# Patient Record
Sex: MALE | Race: ASIAN | NOT HISPANIC OR LATINO | URBAN - METROPOLITAN AREA
[De-identification: names, ages, dates, MRNs, and addresses within clinical notes are randomized per-mention and may not be internally consistent; named-entity substitution may affect disease eponyms.]

---

## 2020-01-01 ENCOUNTER — INPATIENT (INPATIENT)
Facility: HOSPITAL | Age: 65
LOS: 9 days | DRG: 28 | End: 2020-10-24
Attending: INTERNAL MEDICINE | Admitting: TRANSPLANT SURGERY
Payer: MEDICARE

## 2020-01-01 VITALS
OXYGEN SATURATION: 88 % | HEART RATE: 98 BPM | TEMPERATURE: 98 F | RESPIRATION RATE: 16 BRPM | SYSTOLIC BLOOD PRESSURE: 153 MMHG | DIASTOLIC BLOOD PRESSURE: 80 MMHG

## 2020-01-01 VITALS
HEART RATE: 85 BPM | RESPIRATION RATE: 16 BRPM | DIASTOLIC BLOOD PRESSURE: 69 MMHG | SYSTOLIC BLOOD PRESSURE: 116 MMHG | OXYGEN SATURATION: 100 %

## 2020-01-01 DIAGNOSIS — Z71.89 OTHER SPECIFIED COUNSELING: ICD-10-CM

## 2020-01-01 DIAGNOSIS — Z51.5 ENCOUNTER FOR PALLIATIVE CARE: ICD-10-CM

## 2020-01-01 DIAGNOSIS — R53.2 FUNCTIONAL QUADRIPLEGIA: ICD-10-CM

## 2020-01-01 DIAGNOSIS — G95.20 UNSPECIFIED CORD COMPRESSION: ICD-10-CM
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
## 2020-01-01 DIAGNOSIS — J96.90 RESPIRATORY FAILURE, UNSPECIFIED, UNSPECIFIED WHETHER WITH HYPOXIA OR HYPERCAPNIA: ICD-10-CM

## 2020-01-01 DIAGNOSIS — E87.0 HYPEROSMOLALITY AND HYPERNATREMIA: ICD-10-CM

## 2020-01-01 DIAGNOSIS — J18.9 PNEUMONIA, UNSPECIFIED ORGANISM: ICD-10-CM

## 2020-01-01 DIAGNOSIS — R00.1 BRADYCARDIA, UNSPECIFIED: ICD-10-CM

## 2020-01-01 LAB
-  AMIKACIN: SIGNIFICANT CHANGE UP
-  AMIKACIN: SIGNIFICANT CHANGE UP
-  AMOXICILLIN/CLAVULANIC ACID: SIGNIFICANT CHANGE UP
-  AMOXICILLIN/CLAVULANIC ACID: SIGNIFICANT CHANGE UP
-  AMPICILLIN/SULBACTAM: SIGNIFICANT CHANGE UP
-  AMPICILLIN: SIGNIFICANT CHANGE UP
-  AMPICILLIN: SIGNIFICANT CHANGE UP
-  AZTREONAM: SIGNIFICANT CHANGE UP
-  AZTREONAM: SIGNIFICANT CHANGE UP
-  CEFAZOLIN: SIGNIFICANT CHANGE UP
-  CEFEPIME: SIGNIFICANT CHANGE UP
-  CEFEPIME: SIGNIFICANT CHANGE UP
-  CEFOXITIN: SIGNIFICANT CHANGE UP
-  CEFOXITIN: SIGNIFICANT CHANGE UP
-  CEFTRIAXONE: SIGNIFICANT CHANGE UP
-  CEFTRIAXONE: SIGNIFICANT CHANGE UP
-  CIPROFLOXACIN: SIGNIFICANT CHANGE UP
-  CIPROFLOXACIN: SIGNIFICANT CHANGE UP
-  CLINDAMYCIN: SIGNIFICANT CHANGE UP
-  ERTAPENEM: SIGNIFICANT CHANGE UP
-  ERTAPENEM: SIGNIFICANT CHANGE UP
-  ERYTHROMYCIN: SIGNIFICANT CHANGE UP
-  GENTAMICIN: SIGNIFICANT CHANGE UP
-  IMIPENEM: SIGNIFICANT CHANGE UP
-  LEVOFLOXACIN: SIGNIFICANT CHANGE UP
-  LEVOFLOXACIN: SIGNIFICANT CHANGE UP
-  MEROPENEM: SIGNIFICANT CHANGE UP
-  MEROPENEM: SIGNIFICANT CHANGE UP
-  OXACILLIN: SIGNIFICANT CHANGE UP
-  PENICILLIN: SIGNIFICANT CHANGE UP
-  PIPERACILLIN/TAZOBACTAM: SIGNIFICANT CHANGE UP
-  PIPERACILLIN/TAZOBACTAM: SIGNIFICANT CHANGE UP
-  RIFAMPIN: SIGNIFICANT CHANGE UP
-  TETRACYCLINE: SIGNIFICANT CHANGE UP
-  TOBRAMYCIN: SIGNIFICANT CHANGE UP
-  TOBRAMYCIN: SIGNIFICANT CHANGE UP
-  TRIMETHOPRIM/SULFAMETHOXAZOLE: SIGNIFICANT CHANGE UP
-  VANCOMYCIN: SIGNIFICANT CHANGE UP
A1C WITH ESTIMATED AVERAGE GLUCOSE RESULT: 6.3 % — HIGH (ref 4–5.6)
ALBUMIN SERPL ELPH-MCNC: 2.6 G/DL — LOW (ref 3.3–5)
ALBUMIN SERPL ELPH-MCNC: 4.3 G/DL — SIGNIFICANT CHANGE UP (ref 3.3–5)
ALP SERPL-CCNC: 56 U/L — SIGNIFICANT CHANGE UP (ref 40–120)
ALP SERPL-CCNC: 68 U/L — SIGNIFICANT CHANGE UP (ref 40–120)
ALT FLD-CCNC: 20 U/L — SIGNIFICANT CHANGE UP (ref 10–45)
ALT FLD-CCNC: 58 U/L — HIGH (ref 10–45)
AMPHET UR-MCNC: NEGATIVE — SIGNIFICANT CHANGE UP
ANION GAP SERPL CALC-SCNC: 11 MMOL/L — SIGNIFICANT CHANGE UP (ref 5–17)
ANION GAP SERPL CALC-SCNC: 12 MMOL/L — SIGNIFICANT CHANGE UP (ref 5–17)
ANION GAP SERPL CALC-SCNC: 13 MMOL/L — SIGNIFICANT CHANGE UP (ref 5–17)
ANION GAP SERPL CALC-SCNC: 15 MMOL/L — SIGNIFICANT CHANGE UP (ref 5–17)
ANION GAP SERPL CALC-SCNC: 15 MMOL/L — SIGNIFICANT CHANGE UP (ref 5–17)
ANION GAP SERPL CALC-SCNC: 16 MMOL/L — SIGNIFICANT CHANGE UP (ref 5–17)
ANION GAP SERPL CALC-SCNC: 21 MMOL/L — HIGH (ref 5–17)
ANION GAP SERPL CALC-SCNC: 24 MMOL/L — HIGH (ref 5–17)
ANION GAP SERPL CALC-SCNC: 8 MMOL/L — SIGNIFICANT CHANGE UP (ref 5–17)
ANION GAP SERPL CALC-SCNC: 9 MMOL/L — SIGNIFICANT CHANGE UP (ref 5–17)
ANION GAP SERPL CALC-SCNC: 9 MMOL/L — SIGNIFICANT CHANGE UP (ref 5–17)
APPEARANCE UR: CLEAR — SIGNIFICANT CHANGE UP
APTT BLD: 24.5 SEC — LOW (ref 27.5–35.5)
AST SERPL-CCNC: 25 U/L — SIGNIFICANT CHANGE UP (ref 10–40)
AST SERPL-CCNC: 66 U/L — HIGH (ref 10–40)
B-OH-BUTYR SERPL-SCNC: 1.5 MMOL/L — HIGH
B-OH-BUTYR SERPL-SCNC: 1.9 MMOL/L — HIGH
B-OH-BUTYR SERPL-SCNC: 3.1 MMOL/L — HIGH
B-OH-BUTYR SERPL-SCNC: 3.5 MMOL/L — HIGH
BACTERIA # UR AUTO: NEGATIVE — SIGNIFICANT CHANGE UP
BARBITURATES UR SCN-MCNC: NEGATIVE — SIGNIFICANT CHANGE UP
BASOPHILS # BLD AUTO: 0 K/UL — SIGNIFICANT CHANGE UP (ref 0–0.2)
BASOPHILS NFR BLD AUTO: 0 % — SIGNIFICANT CHANGE UP (ref 0–2)
BENZODIAZ UR-MCNC: NEGATIVE — SIGNIFICANT CHANGE UP
BILIRUB SERPL-MCNC: 0.4 MG/DL — SIGNIFICANT CHANGE UP (ref 0.2–1.2)
BILIRUB SERPL-MCNC: 0.5 MG/DL — SIGNIFICANT CHANGE UP (ref 0.2–1.2)
BILIRUB UR-MCNC: NEGATIVE — SIGNIFICANT CHANGE UP
BLD GP AB SCN SERPL QL: NEGATIVE — SIGNIFICANT CHANGE UP
BLD GP AB SCN SERPL QL: NEGATIVE — SIGNIFICANT CHANGE UP
BUN SERPL-MCNC: 10 MG/DL — SIGNIFICANT CHANGE UP (ref 7–23)
BUN SERPL-MCNC: 11 MG/DL — SIGNIFICANT CHANGE UP (ref 7–23)
BUN SERPL-MCNC: 13 MG/DL — SIGNIFICANT CHANGE UP (ref 7–23)
BUN SERPL-MCNC: 15 MG/DL — SIGNIFICANT CHANGE UP (ref 7–23)
BUN SERPL-MCNC: 17 MG/DL — SIGNIFICANT CHANGE UP (ref 7–23)
BUN SERPL-MCNC: 27 MG/DL — HIGH (ref 7–23)
BUN SERPL-MCNC: 28 MG/DL — HIGH (ref 7–23)
BUN SERPL-MCNC: 29 MG/DL — HIGH (ref 7–23)
BUN SERPL-MCNC: 29 MG/DL — HIGH (ref 7–23)
BUN SERPL-MCNC: 30 MG/DL — HIGH (ref 7–23)
BUN SERPL-MCNC: 30 MG/DL — HIGH (ref 7–23)
BUN SERPL-MCNC: 9 MG/DL — SIGNIFICANT CHANGE UP (ref 7–23)
CALCIUM SERPL-MCNC: 7.4 MG/DL — LOW (ref 8.4–10.5)
CALCIUM SERPL-MCNC: 7.5 MG/DL — LOW (ref 8.4–10.5)
CALCIUM SERPL-MCNC: 8 MG/DL — LOW (ref 8.4–10.5)
CALCIUM SERPL-MCNC: 8.3 MG/DL — LOW (ref 8.4–10.5)
CALCIUM SERPL-MCNC: 8.4 MG/DL — SIGNIFICANT CHANGE UP (ref 8.4–10.5)
CALCIUM SERPL-MCNC: 8.4 MG/DL — SIGNIFICANT CHANGE UP (ref 8.4–10.5)
CALCIUM SERPL-MCNC: 8.5 MG/DL — SIGNIFICANT CHANGE UP (ref 8.4–10.5)
CALCIUM SERPL-MCNC: 8.7 MG/DL — SIGNIFICANT CHANGE UP (ref 8.4–10.5)
CALCIUM SERPL-MCNC: 8.9 MG/DL — SIGNIFICANT CHANGE UP (ref 8.4–10.5)
CALCIUM SERPL-MCNC: 9.3 MG/DL — SIGNIFICANT CHANGE UP (ref 8.4–10.5)
CALCIUM SERPL-MCNC: 9.4 MG/DL — SIGNIFICANT CHANGE UP (ref 8.4–10.5)
CHLORIDE SERPL-SCNC: 101 MMOL/L — SIGNIFICANT CHANGE UP (ref 96–108)
CHLORIDE SERPL-SCNC: 103 MMOL/L — SIGNIFICANT CHANGE UP (ref 96–108)
CHLORIDE SERPL-SCNC: 103 MMOL/L — SIGNIFICANT CHANGE UP (ref 96–108)
CHLORIDE SERPL-SCNC: 104 MMOL/L — SIGNIFICANT CHANGE UP (ref 96–108)
CHLORIDE SERPL-SCNC: 104 MMOL/L — SIGNIFICANT CHANGE UP (ref 96–108)
CHLORIDE SERPL-SCNC: 105 MMOL/L — SIGNIFICANT CHANGE UP (ref 96–108)
CHLORIDE SERPL-SCNC: 105 MMOL/L — SIGNIFICANT CHANGE UP (ref 96–108)
CHLORIDE SERPL-SCNC: 106 MMOL/L — SIGNIFICANT CHANGE UP (ref 96–108)
CHLORIDE SERPL-SCNC: 107 MMOL/L — SIGNIFICANT CHANGE UP (ref 96–108)
CHLORIDE SERPL-SCNC: 107 MMOL/L — SIGNIFICANT CHANGE UP (ref 96–108)
CHLORIDE SERPL-SCNC: 109 MMOL/L — HIGH (ref 96–108)
CHLORIDE SERPL-SCNC: 111 MMOL/L — HIGH (ref 96–108)
CHLORIDE SERPL-SCNC: 113 MMOL/L — HIGH (ref 96–108)
CHLORIDE SERPL-SCNC: 114 MMOL/L — HIGH (ref 96–108)
CHLORIDE SERPL-SCNC: 116 MMOL/L — HIGH (ref 96–108)
CHLORIDE UR-SCNC: 183 MMOL/L — SIGNIFICANT CHANGE UP
CO2 SERPL-SCNC: 12 MMOL/L — LOW (ref 22–31)
CO2 SERPL-SCNC: 12 MMOL/L — LOW (ref 22–31)
CO2 SERPL-SCNC: 14 MMOL/L — LOW (ref 22–31)
CO2 SERPL-SCNC: 15 MMOL/L — LOW (ref 22–31)
CO2 SERPL-SCNC: 16 MMOL/L — LOW (ref 22–31)
CO2 SERPL-SCNC: 17 MMOL/L — LOW (ref 22–31)
CO2 SERPL-SCNC: 17 MMOL/L — LOW (ref 22–31)
CO2 SERPL-SCNC: 18 MMOL/L — LOW (ref 22–31)
CO2 SERPL-SCNC: 19 MMOL/L — LOW (ref 22–31)
CO2 SERPL-SCNC: 24 MMOL/L — SIGNIFICANT CHANGE UP (ref 22–31)
CO2 SERPL-SCNC: 26 MMOL/L — SIGNIFICANT CHANGE UP (ref 22–31)
CO2 SERPL-SCNC: 27 MMOL/L — SIGNIFICANT CHANGE UP (ref 22–31)
CO2 SERPL-SCNC: 28 MMOL/L — SIGNIFICANT CHANGE UP (ref 22–31)
COCAINE METAB.OTHER UR-MCNC: NEGATIVE — SIGNIFICANT CHANGE UP
COLOR SPEC: SIGNIFICANT CHANGE UP
CREAT ?TM UR-MCNC: 59 MG/DL — SIGNIFICANT CHANGE UP
CREAT SERPL-MCNC: 0.64 MG/DL — SIGNIFICANT CHANGE UP (ref 0.5–1.3)
CREAT SERPL-MCNC: 0.66 MG/DL — SIGNIFICANT CHANGE UP (ref 0.5–1.3)
CREAT SERPL-MCNC: 0.66 MG/DL — SIGNIFICANT CHANGE UP (ref 0.5–1.3)
CREAT SERPL-MCNC: 0.67 MG/DL — SIGNIFICANT CHANGE UP (ref 0.5–1.3)
CREAT SERPL-MCNC: 0.68 MG/DL — SIGNIFICANT CHANGE UP (ref 0.5–1.3)
CREAT SERPL-MCNC: 0.7 MG/DL — SIGNIFICANT CHANGE UP (ref 0.5–1.3)
CREAT SERPL-MCNC: 0.73 MG/DL — SIGNIFICANT CHANGE UP (ref 0.5–1.3)
CREAT SERPL-MCNC: 0.73 MG/DL — SIGNIFICANT CHANGE UP (ref 0.5–1.3)
CREAT SERPL-MCNC: 0.76 MG/DL — SIGNIFICANT CHANGE UP (ref 0.5–1.3)
CREAT SERPL-MCNC: 0.78 MG/DL — SIGNIFICANT CHANGE UP (ref 0.5–1.3)
CREAT SERPL-MCNC: 0.81 MG/DL — SIGNIFICANT CHANGE UP (ref 0.5–1.3)
CREAT SERPL-MCNC: 0.82 MG/DL — SIGNIFICANT CHANGE UP (ref 0.5–1.3)
CREAT SERPL-MCNC: 0.83 MG/DL — SIGNIFICANT CHANGE UP (ref 0.5–1.3)
CREAT SERPL-MCNC: 0.85 MG/DL — SIGNIFICANT CHANGE UP (ref 0.5–1.3)
CREAT SERPL-MCNC: 1.07 MG/DL — SIGNIFICANT CHANGE UP (ref 0.5–1.3)
CULTURE RESULTS: SIGNIFICANT CHANGE UP
DIFF PNL FLD: ABNORMAL
EOSINOPHIL # BLD AUTO: 0.13 K/UL — SIGNIFICANT CHANGE UP (ref 0–0.5)
EOSINOPHIL NFR BLD AUTO: 1.8 % — SIGNIFICANT CHANGE UP (ref 0–6)
EPI CELLS # UR: 0 /HPF — SIGNIFICANT CHANGE UP
ESTIMATED AVERAGE GLUCOSE: 134 MG/DL — HIGH (ref 68–114)
ETHANOL SERPL-MCNC: SIGNIFICANT CHANGE UP MG/DL (ref 0–10)
GAS PNL BLDA: SIGNIFICANT CHANGE UP
GAS PNL BLDV: SIGNIFICANT CHANGE UP
GLUCOSE BLDC GLUCOMTR-MCNC: 104 MG/DL — HIGH (ref 70–99)
GLUCOSE BLDC GLUCOMTR-MCNC: 107 MG/DL — HIGH (ref 70–99)
GLUCOSE BLDC GLUCOMTR-MCNC: 111 MG/DL — HIGH (ref 70–99)
GLUCOSE BLDC GLUCOMTR-MCNC: 121 MG/DL — HIGH (ref 70–99)
GLUCOSE BLDC GLUCOMTR-MCNC: 122 MG/DL — HIGH (ref 70–99)
GLUCOSE BLDC GLUCOMTR-MCNC: 128 MG/DL — HIGH (ref 70–99)
GLUCOSE BLDC GLUCOMTR-MCNC: 134 MG/DL — HIGH (ref 70–99)
GLUCOSE BLDC GLUCOMTR-MCNC: 134 MG/DL — HIGH (ref 70–99)
GLUCOSE BLDC GLUCOMTR-MCNC: 136 MG/DL — HIGH (ref 70–99)
GLUCOSE BLDC GLUCOMTR-MCNC: 137 MG/DL — HIGH (ref 70–99)
GLUCOSE BLDC GLUCOMTR-MCNC: 138 MG/DL — HIGH (ref 70–99)
GLUCOSE BLDC GLUCOMTR-MCNC: 142 MG/DL — HIGH (ref 70–99)
GLUCOSE BLDC GLUCOMTR-MCNC: 144 MG/DL — HIGH (ref 70–99)
GLUCOSE BLDC GLUCOMTR-MCNC: 149 MG/DL — HIGH (ref 70–99)
GLUCOSE BLDC GLUCOMTR-MCNC: 156 MG/DL — HIGH (ref 70–99)
GLUCOSE BLDC GLUCOMTR-MCNC: 157 MG/DL — HIGH (ref 70–99)
GLUCOSE BLDC GLUCOMTR-MCNC: 161 MG/DL — HIGH (ref 70–99)
GLUCOSE BLDC GLUCOMTR-MCNC: 162 MG/DL — HIGH (ref 70–99)
GLUCOSE BLDC GLUCOMTR-MCNC: 169 MG/DL — HIGH (ref 70–99)
GLUCOSE BLDC GLUCOMTR-MCNC: 169 MG/DL — HIGH (ref 70–99)
GLUCOSE BLDC GLUCOMTR-MCNC: 171 MG/DL — HIGH (ref 70–99)
GLUCOSE BLDC GLUCOMTR-MCNC: 171 MG/DL — HIGH (ref 70–99)
GLUCOSE BLDC GLUCOMTR-MCNC: 173 MG/DL — HIGH (ref 70–99)
GLUCOSE BLDC GLUCOMTR-MCNC: 186 MG/DL — HIGH (ref 70–99)
GLUCOSE BLDC GLUCOMTR-MCNC: 194 MG/DL — HIGH (ref 70–99)
GLUCOSE BLDC GLUCOMTR-MCNC: 197 MG/DL — HIGH (ref 70–99)
GLUCOSE BLDC GLUCOMTR-MCNC: 199 MG/DL — HIGH (ref 70–99)
GLUCOSE BLDC GLUCOMTR-MCNC: 203 MG/DL — HIGH (ref 70–99)
GLUCOSE BLDC GLUCOMTR-MCNC: 209 MG/DL — HIGH (ref 70–99)
GLUCOSE BLDC GLUCOMTR-MCNC: 213 MG/DL — HIGH (ref 70–99)
GLUCOSE BLDC GLUCOMTR-MCNC: 214 MG/DL — HIGH (ref 70–99)
GLUCOSE BLDC GLUCOMTR-MCNC: 214 MG/DL — HIGH (ref 70–99)
GLUCOSE BLDC GLUCOMTR-MCNC: 222 MG/DL — HIGH (ref 70–99)
GLUCOSE BLDC GLUCOMTR-MCNC: 226 MG/DL — HIGH (ref 70–99)
GLUCOSE BLDC GLUCOMTR-MCNC: 232 MG/DL — HIGH (ref 70–99)
GLUCOSE BLDC GLUCOMTR-MCNC: 233 MG/DL — HIGH (ref 70–99)
GLUCOSE BLDC GLUCOMTR-MCNC: 235 MG/DL — HIGH (ref 70–99)
GLUCOSE BLDC GLUCOMTR-MCNC: 247 MG/DL — HIGH (ref 70–99)
GLUCOSE BLDC GLUCOMTR-MCNC: 250 MG/DL — HIGH (ref 70–99)
GLUCOSE BLDC GLUCOMTR-MCNC: 253 MG/DL — HIGH (ref 70–99)
GLUCOSE BLDC GLUCOMTR-MCNC: 255 MG/DL — HIGH (ref 70–99)
GLUCOSE BLDC GLUCOMTR-MCNC: 264 MG/DL — HIGH (ref 70–99)
GLUCOSE BLDC GLUCOMTR-MCNC: 277 MG/DL — HIGH (ref 70–99)
GLUCOSE BLDC GLUCOMTR-MCNC: 53 MG/DL — LOW (ref 70–99)
GLUCOSE BLDC GLUCOMTR-MCNC: 62 MG/DL — LOW (ref 70–99)
GLUCOSE BLDC GLUCOMTR-MCNC: 73 MG/DL — SIGNIFICANT CHANGE UP (ref 70–99)
GLUCOSE BLDC GLUCOMTR-MCNC: 96 MG/DL — SIGNIFICANT CHANGE UP (ref 70–99)
GLUCOSE BLDC GLUCOMTR-MCNC: 99 MG/DL — SIGNIFICANT CHANGE UP (ref 70–99)
GLUCOSE SERPL-MCNC: 122 MG/DL — HIGH (ref 70–99)
GLUCOSE SERPL-MCNC: 127 MG/DL — HIGH (ref 70–99)
GLUCOSE SERPL-MCNC: 134 MG/DL — HIGH (ref 70–99)
GLUCOSE SERPL-MCNC: 149 MG/DL — HIGH (ref 70–99)
GLUCOSE SERPL-MCNC: 160 MG/DL — HIGH (ref 70–99)
GLUCOSE SERPL-MCNC: 171 MG/DL — HIGH (ref 70–99)
GLUCOSE SERPL-MCNC: 185 MG/DL — HIGH (ref 70–99)
GLUCOSE SERPL-MCNC: 203 MG/DL — HIGH (ref 70–99)
GLUCOSE SERPL-MCNC: 223 MG/DL — HIGH (ref 70–99)
GLUCOSE SERPL-MCNC: 225 MG/DL — HIGH (ref 70–99)
GLUCOSE SERPL-MCNC: 242 MG/DL — HIGH (ref 70–99)
GLUCOSE SERPL-MCNC: 243 MG/DL — HIGH (ref 70–99)
GLUCOSE SERPL-MCNC: 252 MG/DL — HIGH (ref 70–99)
GLUCOSE SERPL-MCNC: 261 MG/DL — HIGH (ref 70–99)
GLUCOSE SERPL-MCNC: 95 MG/DL — SIGNIFICANT CHANGE UP (ref 70–99)
GLUCOSE UR QL: ABNORMAL
GRAM STN FLD: SIGNIFICANT CHANGE UP
HCT VFR BLD CALC: 21.6 % — LOW (ref 39–50)
HCT VFR BLD CALC: 21.8 % — LOW (ref 39–50)
HCT VFR BLD CALC: 22.1 % — LOW (ref 39–50)
HCT VFR BLD CALC: 23.8 % — LOW (ref 39–50)
HCT VFR BLD CALC: 23.9 % — LOW (ref 39–50)
HCT VFR BLD CALC: 24.2 % — LOW (ref 39–50)
HCT VFR BLD CALC: 26.1 % — LOW (ref 39–50)
HCT VFR BLD CALC: 28.9 % — LOW (ref 39–50)
HCT VFR BLD CALC: 30.9 % — LOW (ref 39–50)
HCT VFR BLD CALC: 32.1 % — LOW (ref 39–50)
HCT VFR BLD CALC: 32.5 % — LOW (ref 39–50)
HCV AB S/CO SERPL IA: 0.08 S/CO — SIGNIFICANT CHANGE UP (ref 0–0.99)
HCV AB SERPL-IMP: SIGNIFICANT CHANGE UP
HGB BLD-MCNC: 10.2 G/DL — LOW (ref 13–17)
HGB BLD-MCNC: 10.4 G/DL — LOW (ref 13–17)
HGB BLD-MCNC: 10.4 G/DL — LOW (ref 13–17)
HGB BLD-MCNC: 7.3 G/DL — LOW (ref 13–17)
HGB BLD-MCNC: 7.4 G/DL — LOW (ref 13–17)
HGB BLD-MCNC: 7.5 G/DL — LOW (ref 13–17)
HGB BLD-MCNC: 7.8 G/DL — LOW (ref 13–17)
HGB BLD-MCNC: 8.1 G/DL — LOW (ref 13–17)
HGB BLD-MCNC: 8.1 G/DL — LOW (ref 13–17)
HGB BLD-MCNC: 8.6 G/DL — LOW (ref 13–17)
HGB BLD-MCNC: 9.5 G/DL — LOW (ref 13–17)
HYALINE CASTS # UR AUTO: 0 /LPF — SIGNIFICANT CHANGE UP (ref 0–7)
INR BLD: 1.01 RATIO — SIGNIFICANT CHANGE UP (ref 0.88–1.16)
IRON SATN MFR SERPL: 15 UG/DL — LOW (ref 45–165)
IRON SATN MFR SERPL: 9 % — LOW (ref 16–55)
KETONES UR-MCNC: ABNORMAL
LEUKOCYTE ESTERASE UR-ACNC: NEGATIVE — SIGNIFICANT CHANGE UP
LIDOCAIN IGE QN: 69 U/L — HIGH (ref 7–60)
LYMPHOCYTES # BLD AUTO: 0.82 K/UL — LOW (ref 1–3.3)
LYMPHOCYTES # BLD AUTO: 11.3 % — LOW (ref 13–44)
MAGNESIUM SERPL-MCNC: 1.6 MG/DL — SIGNIFICANT CHANGE UP (ref 1.6–2.6)
MAGNESIUM SERPL-MCNC: 1.9 MG/DL — SIGNIFICANT CHANGE UP (ref 1.6–2.6)
MAGNESIUM SERPL-MCNC: 2 MG/DL — SIGNIFICANT CHANGE UP (ref 1.6–2.6)
MAGNESIUM SERPL-MCNC: 2 MG/DL — SIGNIFICANT CHANGE UP (ref 1.6–2.6)
MAGNESIUM SERPL-MCNC: 2.2 MG/DL — SIGNIFICANT CHANGE UP (ref 1.6–2.6)
MAGNESIUM SERPL-MCNC: 2.3 MG/DL — SIGNIFICANT CHANGE UP (ref 1.6–2.6)
MAGNESIUM SERPL-MCNC: 2.3 MG/DL — SIGNIFICANT CHANGE UP (ref 1.6–2.6)
MAGNESIUM SERPL-MCNC: 2.4 MG/DL — SIGNIFICANT CHANGE UP (ref 1.6–2.6)
MAGNESIUM SERPL-MCNC: 2.4 MG/DL — SIGNIFICANT CHANGE UP (ref 1.6–2.6)
MAGNESIUM SERPL-MCNC: 2.5 MG/DL — SIGNIFICANT CHANGE UP (ref 1.6–2.6)
MCHC RBC-ENTMCNC: 22.6 PG — LOW (ref 27–34)
MCHC RBC-ENTMCNC: 23 PG — LOW (ref 27–34)
MCHC RBC-ENTMCNC: 23.3 PG — LOW (ref 27–34)
MCHC RBC-ENTMCNC: 23.5 PG — LOW (ref 27–34)
MCHC RBC-ENTMCNC: 23.7 PG — LOW (ref 27–34)
MCHC RBC-ENTMCNC: 24 PG — LOW (ref 27–34)
MCHC RBC-ENTMCNC: 24.1 PG — LOW (ref 27–34)
MCHC RBC-ENTMCNC: 24.1 PG — LOW (ref 27–34)
MCHC RBC-ENTMCNC: 24.6 PG — LOW (ref 27–34)
MCHC RBC-ENTMCNC: 32 GM/DL — SIGNIFICANT CHANGE UP (ref 32–36)
MCHC RBC-ENTMCNC: 32.4 GM/DL — SIGNIFICANT CHANGE UP (ref 32–36)
MCHC RBC-ENTMCNC: 32.8 GM/DL — SIGNIFICANT CHANGE UP (ref 32–36)
MCHC RBC-ENTMCNC: 32.9 GM/DL — SIGNIFICANT CHANGE UP (ref 32–36)
MCHC RBC-ENTMCNC: 33 GM/DL — SIGNIFICANT CHANGE UP (ref 32–36)
MCHC RBC-ENTMCNC: 33 GM/DL — SIGNIFICANT CHANGE UP (ref 32–36)
MCHC RBC-ENTMCNC: 33.5 GM/DL — SIGNIFICANT CHANGE UP (ref 32–36)
MCHC RBC-ENTMCNC: 33.8 GM/DL — SIGNIFICANT CHANGE UP (ref 32–36)
MCHC RBC-ENTMCNC: 33.9 GM/DL — SIGNIFICANT CHANGE UP (ref 32–36)
MCV RBC AUTO: 67.9 FL — LOW (ref 80–100)
MCV RBC AUTO: 69.7 FL — LOW (ref 80–100)
MCV RBC AUTO: 70 FL — LOW (ref 80–100)
MCV RBC AUTO: 70.5 FL — LOW (ref 80–100)
MCV RBC AUTO: 70.6 FL — LOW (ref 80–100)
MCV RBC AUTO: 70.9 FL — LOW (ref 80–100)
MCV RBC AUTO: 71 FL — LOW (ref 80–100)
MCV RBC AUTO: 71.1 FL — LOW (ref 80–100)
MCV RBC AUTO: 71.2 FL — LOW (ref 80–100)
MCV RBC AUTO: 72.3 FL — LOW (ref 80–100)
MCV RBC AUTO: 74.8 FL — LOW (ref 80–100)
METHADONE UR-MCNC: NEGATIVE — SIGNIFICANT CHANGE UP
METHOD TYPE: SIGNIFICANT CHANGE UP
MONOCYTES # BLD AUTO: 0.38 K/UL — SIGNIFICANT CHANGE UP (ref 0–0.9)
MONOCYTES NFR BLD AUTO: 5.2 % — SIGNIFICANT CHANGE UP (ref 2–14)
NEUTROPHILS # BLD AUTO: 5.88 K/UL — SIGNIFICANT CHANGE UP (ref 1.8–7.4)
NEUTROPHILS NFR BLD AUTO: 79.1 % — HIGH (ref 43–77)
NITRITE UR-MCNC: NEGATIVE — SIGNIFICANT CHANGE UP
NRBC # BLD: 0 /100 WBCS — SIGNIFICANT CHANGE UP (ref 0–0)
NRBC # BLD: 1 /100 WBCS — HIGH (ref 0–0)
NRBC # BLD: 1 /100 WBCS — HIGH (ref 0–0)
NRBC # BLD: 2 /100 WBCS — HIGH (ref 0–0)
NRBC # BLD: 3 /100 WBCS — HIGH (ref 0–0)
OPIATES UR-MCNC: NEGATIVE — SIGNIFICANT CHANGE UP
ORGANISM # SPEC MICROSCOPIC CNT: SIGNIFICANT CHANGE UP
OSMOLALITY UR: 637 MOS/KG — SIGNIFICANT CHANGE UP (ref 300–900)
OXYCODONE UR-MCNC: NEGATIVE — SIGNIFICANT CHANGE UP
PCP SPEC-MCNC: SIGNIFICANT CHANGE UP
PCP UR-MCNC: NEGATIVE — SIGNIFICANT CHANGE UP
PH UR: 6 — SIGNIFICANT CHANGE UP (ref 5–8)
PHOSPHATE SERPL-MCNC: 1.4 MG/DL — LOW (ref 2.5–4.5)
PHOSPHATE SERPL-MCNC: 2 MG/DL — LOW (ref 2.5–4.5)
PHOSPHATE SERPL-MCNC: 2.1 MG/DL — LOW (ref 2.5–4.5)
PHOSPHATE SERPL-MCNC: 2.6 MG/DL — SIGNIFICANT CHANGE UP (ref 2.5–4.5)
PHOSPHATE SERPL-MCNC: 2.9 MG/DL — SIGNIFICANT CHANGE UP (ref 2.5–4.5)
PHOSPHATE SERPL-MCNC: 3.1 MG/DL — SIGNIFICANT CHANGE UP (ref 2.5–4.5)
PHOSPHATE SERPL-MCNC: 3.9 MG/DL — SIGNIFICANT CHANGE UP (ref 2.5–4.5)
PHOSPHATE SERPL-MCNC: 4.3 MG/DL — SIGNIFICANT CHANGE UP (ref 2.5–4.5)
PHOSPHATE SERPL-MCNC: 4.7 MG/DL — HIGH (ref 2.5–4.5)
PHOSPHATE SERPL-MCNC: 9.9 MG/DL — HIGH (ref 2.5–4.5)
PLATELET # BLD AUTO: 219 K/UL — SIGNIFICANT CHANGE UP (ref 150–400)
PLATELET # BLD AUTO: 232 K/UL — SIGNIFICANT CHANGE UP (ref 150–400)
PLATELET # BLD AUTO: 237 K/UL — SIGNIFICANT CHANGE UP (ref 150–400)
PLATELET # BLD AUTO: 252 K/UL — SIGNIFICANT CHANGE UP (ref 150–400)
PLATELET # BLD AUTO: 256 K/UL — SIGNIFICANT CHANGE UP (ref 150–400)
PLATELET # BLD AUTO: 262 K/UL — SIGNIFICANT CHANGE UP (ref 150–400)
PLATELET # BLD AUTO: 263 K/UL — SIGNIFICANT CHANGE UP (ref 150–400)
PLATELET # BLD AUTO: 271 K/UL — SIGNIFICANT CHANGE UP (ref 150–400)
PLATELET # BLD AUTO: 324 K/UL — SIGNIFICANT CHANGE UP (ref 150–400)
PLATELET # BLD AUTO: 326 K/UL — SIGNIFICANT CHANGE UP (ref 150–400)
PLATELET # BLD AUTO: 329 K/UL — SIGNIFICANT CHANGE UP (ref 150–400)
POTASSIUM SERPL-MCNC: 3.5 MMOL/L — SIGNIFICANT CHANGE UP (ref 3.5–5.3)
POTASSIUM SERPL-MCNC: 3.5 MMOL/L — SIGNIFICANT CHANGE UP (ref 3.5–5.3)
POTASSIUM SERPL-MCNC: 3.7 MMOL/L — SIGNIFICANT CHANGE UP (ref 3.5–5.3)
POTASSIUM SERPL-MCNC: 3.7 MMOL/L — SIGNIFICANT CHANGE UP (ref 3.5–5.3)
POTASSIUM SERPL-MCNC: 3.9 MMOL/L — SIGNIFICANT CHANGE UP (ref 3.5–5.3)
POTASSIUM SERPL-MCNC: 4.1 MMOL/L — SIGNIFICANT CHANGE UP (ref 3.5–5.3)
POTASSIUM SERPL-MCNC: 4.3 MMOL/L — SIGNIFICANT CHANGE UP (ref 3.5–5.3)
POTASSIUM SERPL-MCNC: 4.4 MMOL/L — SIGNIFICANT CHANGE UP (ref 3.5–5.3)
POTASSIUM SERPL-MCNC: 4.5 MMOL/L — SIGNIFICANT CHANGE UP (ref 3.5–5.3)
POTASSIUM SERPL-MCNC: 4.6 MMOL/L — SIGNIFICANT CHANGE UP (ref 3.5–5.3)
POTASSIUM SERPL-MCNC: 4.8 MMOL/L — SIGNIFICANT CHANGE UP (ref 3.5–5.3)
POTASSIUM SERPL-MCNC: 4.8 MMOL/L — SIGNIFICANT CHANGE UP (ref 3.5–5.3)
POTASSIUM SERPL-MCNC: 5.3 MMOL/L — SIGNIFICANT CHANGE UP (ref 3.5–5.3)
POTASSIUM SERPL-SCNC: 3.5 MMOL/L — SIGNIFICANT CHANGE UP (ref 3.5–5.3)
POTASSIUM SERPL-SCNC: 3.5 MMOL/L — SIGNIFICANT CHANGE UP (ref 3.5–5.3)
POTASSIUM SERPL-SCNC: 3.7 MMOL/L — SIGNIFICANT CHANGE UP (ref 3.5–5.3)
POTASSIUM SERPL-SCNC: 3.7 MMOL/L — SIGNIFICANT CHANGE UP (ref 3.5–5.3)
POTASSIUM SERPL-SCNC: 3.9 MMOL/L — SIGNIFICANT CHANGE UP (ref 3.5–5.3)
POTASSIUM SERPL-SCNC: 4.1 MMOL/L — SIGNIFICANT CHANGE UP (ref 3.5–5.3)
POTASSIUM SERPL-SCNC: 4.3 MMOL/L — SIGNIFICANT CHANGE UP (ref 3.5–5.3)
POTASSIUM SERPL-SCNC: 4.4 MMOL/L — SIGNIFICANT CHANGE UP (ref 3.5–5.3)
POTASSIUM SERPL-SCNC: 4.5 MMOL/L — SIGNIFICANT CHANGE UP (ref 3.5–5.3)
POTASSIUM SERPL-SCNC: 4.6 MMOL/L — SIGNIFICANT CHANGE UP (ref 3.5–5.3)
POTASSIUM SERPL-SCNC: 4.8 MMOL/L — SIGNIFICANT CHANGE UP (ref 3.5–5.3)
POTASSIUM SERPL-SCNC: 4.8 MMOL/L — SIGNIFICANT CHANGE UP (ref 3.5–5.3)
POTASSIUM SERPL-SCNC: 5.3 MMOL/L — SIGNIFICANT CHANGE UP (ref 3.5–5.3)
PROCALCITONIN SERPL-MCNC: 1.66 NG/ML — HIGH (ref 0.02–0.1)
PROT SERPL-MCNC: 5.6 G/DL — LOW (ref 6–8.3)
PROT SERPL-MCNC: 7.1 G/DL — SIGNIFICANT CHANGE UP (ref 6–8.3)
PROT UR-MCNC: ABNORMAL
PROTHROM AB SERPL-ACNC: 12.1 SEC — SIGNIFICANT CHANGE UP (ref 10.6–13.6)
RBC # BLD: 3.07 M/UL — LOW (ref 4.2–5.8)
RBC # BLD: 3.13 M/UL — LOW (ref 4.2–5.8)
RBC # BLD: 3.18 M/UL — LOW (ref 4.2–5.8)
RBC # BLD: 3.29 M/UL — LOW (ref 4.2–5.8)
RBC # BLD: 3.36 M/UL — LOW (ref 4.2–5.8)
RBC # BLD: 3.47 M/UL — LOW (ref 4.2–5.8)
RBC # BLD: 3.49 M/UL — LOW (ref 4.2–5.8)
RBC # BLD: 4.13 M/UL — LOW (ref 4.2–5.8)
RBC # BLD: 4.34 M/UL — SIGNIFICANT CHANGE UP (ref 4.2–5.8)
RBC # BLD: 4.53 M/UL — SIGNIFICANT CHANGE UP (ref 4.2–5.8)
RBC # BLD: 4.61 M/UL — SIGNIFICANT CHANGE UP (ref 4.2–5.8)
RBC # FLD: 14.2 % — SIGNIFICANT CHANGE UP (ref 10.3–14.5)
RBC # FLD: 14.2 % — SIGNIFICANT CHANGE UP (ref 10.3–14.5)
RBC # FLD: 14.6 % — HIGH (ref 10.3–14.5)
RBC # FLD: 14.6 % — HIGH (ref 10.3–14.5)
RBC # FLD: 14.7 % — HIGH (ref 10.3–14.5)
RBC # FLD: 14.8 % — HIGH (ref 10.3–14.5)
RBC # FLD: 16.8 % — HIGH (ref 10.3–14.5)
RBC # FLD: 16.9 % — HIGH (ref 10.3–14.5)
RBC # FLD: 16.9 % — HIGH (ref 10.3–14.5)
RBC # FLD: 17.2 % — HIGH (ref 10.3–14.5)
RBC # FLD: 19.1 % — HIGH (ref 10.3–14.5)
RBC CASTS # UR COMP ASSIST: 6 /HPF — HIGH (ref 0–4)
RH IG SCN BLD-IMP: POSITIVE — SIGNIFICANT CHANGE UP
RH IG SCN BLD-IMP: POSITIVE — SIGNIFICANT CHANGE UP
SARS-COV-2 IGG SERPL QL IA: NEGATIVE — SIGNIFICANT CHANGE UP
SARS-COV-2 IGM SERPL IA-ACNC: <0.1 INDEX — SIGNIFICANT CHANGE UP
SARS-COV-2 RNA SPEC QL NAA+PROBE: SIGNIFICANT CHANGE UP
SODIUM SERPL-SCNC: 130 MMOL/L — LOW (ref 135–145)
SODIUM SERPL-SCNC: 134 MMOL/L — LOW (ref 135–145)
SODIUM SERPL-SCNC: 136 MMOL/L — SIGNIFICANT CHANGE UP (ref 135–145)
SODIUM SERPL-SCNC: 137 MMOL/L — SIGNIFICANT CHANGE UP (ref 135–145)
SODIUM SERPL-SCNC: 139 MMOL/L — SIGNIFICANT CHANGE UP (ref 135–145)
SODIUM SERPL-SCNC: 139 MMOL/L — SIGNIFICANT CHANGE UP (ref 135–145)
SODIUM SERPL-SCNC: 142 MMOL/L — SIGNIFICANT CHANGE UP (ref 135–145)
SODIUM SERPL-SCNC: 146 MMOL/L — HIGH (ref 135–145)
SODIUM SERPL-SCNC: 148 MMOL/L — HIGH (ref 135–145)
SODIUM SERPL-SCNC: 150 MMOL/L — HIGH (ref 135–145)
SODIUM SERPL-SCNC: 151 MMOL/L — HIGH (ref 135–145)
SODIUM UR-SCNC: 191 MMOL/L — SIGNIFICANT CHANGE UP
SP GR SPEC: 1.03 — HIGH (ref 1.01–1.02)
SPECIMEN SOURCE: SIGNIFICANT CHANGE UP
THC UR QL: NEGATIVE — SIGNIFICANT CHANGE UP
TIBC SERPL-MCNC: 168 UG/DL — LOW (ref 220–430)
TROPONIN T, HIGH SENSITIVITY RESULT: 14 NG/L — SIGNIFICANT CHANGE UP (ref 0–51)
UIBC SERPL-MCNC: 153 UG/DL — SIGNIFICANT CHANGE UP (ref 110–370)
URATE SERPL-MCNC: 2.2 MG/DL — LOW (ref 3.4–8.8)
URATE UR-MCNC: 21 MG/DL — SIGNIFICANT CHANGE UP
UROBILINOGEN FLD QL: NEGATIVE — SIGNIFICANT CHANGE UP
WBC # BLD: 10.25 K/UL — SIGNIFICANT CHANGE UP (ref 3.8–10.5)
WBC # BLD: 10.62 K/UL — HIGH (ref 3.8–10.5)
WBC # BLD: 11.28 K/UL — HIGH (ref 3.8–10.5)
WBC # BLD: 12.28 K/UL — HIGH (ref 3.8–10.5)
WBC # BLD: 12.32 K/UL — HIGH (ref 3.8–10.5)
WBC # BLD: 13.37 K/UL — HIGH (ref 3.8–10.5)
WBC # BLD: 15.75 K/UL — HIGH (ref 3.8–10.5)
WBC # BLD: 7.28 K/UL — SIGNIFICANT CHANGE UP (ref 3.8–10.5)
WBC # BLD: 8.26 K/UL — SIGNIFICANT CHANGE UP (ref 3.8–10.5)
WBC # BLD: 8.87 K/UL — SIGNIFICANT CHANGE UP (ref 3.8–10.5)
WBC # BLD: 9.34 K/UL — SIGNIFICANT CHANGE UP (ref 3.8–10.5)
WBC # FLD AUTO: 10.25 K/UL — SIGNIFICANT CHANGE UP (ref 3.8–10.5)
WBC # FLD AUTO: 10.62 K/UL — HIGH (ref 3.8–10.5)
WBC # FLD AUTO: 11.28 K/UL — HIGH (ref 3.8–10.5)
WBC # FLD AUTO: 12.28 K/UL — HIGH (ref 3.8–10.5)
WBC # FLD AUTO: 12.32 K/UL — HIGH (ref 3.8–10.5)
WBC # FLD AUTO: 13.37 K/UL — HIGH (ref 3.8–10.5)
WBC # FLD AUTO: 15.75 K/UL — HIGH (ref 3.8–10.5)
WBC # FLD AUTO: 7.28 K/UL — SIGNIFICANT CHANGE UP (ref 3.8–10.5)
WBC # FLD AUTO: 8.26 K/UL — SIGNIFICANT CHANGE UP (ref 3.8–10.5)
WBC # FLD AUTO: 8.87 K/UL — SIGNIFICANT CHANGE UP (ref 3.8–10.5)
WBC # FLD AUTO: 9.34 K/UL — SIGNIFICANT CHANGE UP (ref 3.8–10.5)
WBC UR QL: 1 /HPF — SIGNIFICANT CHANGE UP (ref 0–5)

## 2020-01-01 PROCEDURE — 99291 CRITICAL CARE FIRST HOUR: CPT

## 2020-01-01 PROCEDURE — 70486 CT MAXILLOFACIAL W/O DYE: CPT | Mod: 26

## 2020-01-01 PROCEDURE — 72141 MRI NECK SPINE W/O DYE: CPT | Mod: 26

## 2020-01-01 PROCEDURE — 85610 PROTHROMBIN TIME: CPT

## 2020-01-01 PROCEDURE — 80307 DRUG TEST PRSMV CHEM ANLYZR: CPT

## 2020-01-01 PROCEDURE — 99292 CRITICAL CARE ADDL 30 MIN: CPT

## 2020-01-01 PROCEDURE — 72146 MRI CHEST SPINE W/O DYE: CPT

## 2020-01-01 PROCEDURE — 70450 CT HEAD/BRAIN W/O DYE: CPT

## 2020-01-01 PROCEDURE — 71045 X-RAY EXAM CHEST 1 VIEW: CPT | Mod: 26

## 2020-01-01 PROCEDURE — 99498 ADVNCD CARE PLAN ADDL 30 MIN: CPT

## 2020-01-01 PROCEDURE — 71045 X-RAY EXAM CHEST 1 VIEW: CPT | Mod: 26,77

## 2020-01-01 PROCEDURE — 72146 MRI CHEST SPINE W/O DYE: CPT | Mod: 26

## 2020-01-01 PROCEDURE — 93010 ELECTROCARDIOGRAM REPORT: CPT

## 2020-01-01 PROCEDURE — 83550 IRON BINDING TEST: CPT

## 2020-01-01 PROCEDURE — 36430 TRANSFUSION BLD/BLD COMPNT: CPT

## 2020-01-01 PROCEDURE — 36620 INSERTION CATHETER ARTERY: CPT

## 2020-01-01 PROCEDURE — 83935 ASSAY OF URINE OSMOLALITY: CPT

## 2020-01-01 PROCEDURE — 85027 COMPLETE CBC AUTOMATED: CPT

## 2020-01-01 PROCEDURE — 99497 ADVNCD CARE PLAN 30 MIN: CPT | Mod: 25

## 2020-01-01 PROCEDURE — C1713: CPT

## 2020-01-01 PROCEDURE — 82803 BLOOD GASES ANY COMBINATION: CPT

## 2020-01-01 PROCEDURE — P9016: CPT

## 2020-01-01 PROCEDURE — 99291 CRITICAL CARE FIRST HOUR: CPT | Mod: 25

## 2020-01-01 PROCEDURE — 82570 ASSAY OF URINE CREATININE: CPT

## 2020-01-01 PROCEDURE — 85025 COMPLETE CBC W/AUTO DIFF WBC: CPT

## 2020-01-01 PROCEDURE — 86803 HEPATITIS C AB TEST: CPT

## 2020-01-01 PROCEDURE — 72125 CT NECK SPINE W/O DYE: CPT | Mod: 26

## 2020-01-01 PROCEDURE — 84132 ASSAY OF SERUM POTASSIUM: CPT

## 2020-01-01 PROCEDURE — 85730 THROMBOPLASTIN TIME PARTIAL: CPT

## 2020-01-01 PROCEDURE — 93970 EXTREMITY STUDY: CPT | Mod: 26

## 2020-01-01 PROCEDURE — 71260 CT THORAX DX C+: CPT

## 2020-01-01 PROCEDURE — 84560 ASSAY OF URINE/URIC ACID: CPT

## 2020-01-01 PROCEDURE — 72131 CT LUMBAR SPINE W/O DYE: CPT | Mod: 26

## 2020-01-01 PROCEDURE — 84300 ASSAY OF URINE SODIUM: CPT

## 2020-01-01 PROCEDURE — 94002 VENT MGMT INPAT INIT DAY: CPT

## 2020-01-01 PROCEDURE — 99222 1ST HOSP IP/OBS MODERATE 55: CPT

## 2020-01-01 PROCEDURE — 71045 X-RAY EXAM CHEST 1 VIEW: CPT

## 2020-01-01 PROCEDURE — 85018 HEMOGLOBIN: CPT

## 2020-01-01 PROCEDURE — 76000 FLUOROSCOPY <1 HR PHYS/QHP: CPT

## 2020-01-01 PROCEDURE — 74177 CT ABD & PELVIS W/CONTRAST: CPT | Mod: 26

## 2020-01-01 PROCEDURE — 99223 1ST HOSP IP/OBS HIGH 75: CPT

## 2020-01-01 PROCEDURE — 84550 ASSAY OF BLOOD/URIC ACID: CPT

## 2020-01-01 PROCEDURE — 82436 ASSAY OF URINE CHLORIDE: CPT

## 2020-01-01 PROCEDURE — 93970 EXTREMITY STUDY: CPT

## 2020-01-01 PROCEDURE — 76377 3D RENDER W/INTRP POSTPROCES: CPT

## 2020-01-01 PROCEDURE — 86901 BLOOD TYPING SEROLOGIC RH(D): CPT

## 2020-01-01 PROCEDURE — 99233 SBSQ HOSP IP/OBS HIGH 50: CPT

## 2020-01-01 PROCEDURE — 76377 3D RENDER W/INTRP POSTPROCES: CPT | Mod: 26

## 2020-01-01 PROCEDURE — 80053 COMPREHEN METABOLIC PANEL: CPT

## 2020-01-01 PROCEDURE — 82565 ASSAY OF CREATININE: CPT

## 2020-01-01 PROCEDURE — 86769 SARS-COV-2 COVID-19 ANTIBODY: CPT

## 2020-01-01 PROCEDURE — 71260 CT THORAX DX C+: CPT | Mod: 26

## 2020-01-01 PROCEDURE — 70450 CT HEAD/BRAIN W/O DYE: CPT | Mod: 26

## 2020-01-01 PROCEDURE — 72141 MRI NECK SPINE W/O DYE: CPT

## 2020-01-01 PROCEDURE — 83605 ASSAY OF LACTIC ACID: CPT

## 2020-01-01 PROCEDURE — 82435 ASSAY OF BLOOD CHLORIDE: CPT

## 2020-01-01 PROCEDURE — G0390: CPT

## 2020-01-01 PROCEDURE — 84100 ASSAY OF PHOSPHORUS: CPT

## 2020-01-01 PROCEDURE — 80048 BASIC METABOLIC PNL TOTAL CA: CPT

## 2020-01-01 PROCEDURE — 82947 ASSAY GLUCOSE BLOOD QUANT: CPT

## 2020-01-01 PROCEDURE — 93306 TTE W/DOPPLER COMPLETE: CPT

## 2020-01-01 PROCEDURE — 82330 ASSAY OF CALCIUM: CPT

## 2020-01-01 PROCEDURE — 82010 KETONE BODYS QUAN: CPT

## 2020-01-01 PROCEDURE — 84484 ASSAY OF TROPONIN QUANT: CPT

## 2020-01-01 PROCEDURE — 84295 ASSAY OF SERUM SODIUM: CPT

## 2020-01-01 PROCEDURE — 71045 X-RAY EXAM CHEST 1 VIEW: CPT | Mod: 26,76

## 2020-01-01 PROCEDURE — 86850 RBC ANTIBODY SCREEN: CPT

## 2020-01-01 PROCEDURE — 81001 URINALYSIS AUTO W/SCOPE: CPT

## 2020-01-01 PROCEDURE — 94640 AIRWAY INHALATION TREATMENT: CPT

## 2020-01-01 PROCEDURE — 97760 ORTHOTIC MGMT&TRAING 1ST ENC: CPT

## 2020-01-01 PROCEDURE — 85014 HEMATOCRIT: CPT

## 2020-01-01 PROCEDURE — 86900 BLOOD TYPING SEROLOGIC ABO: CPT

## 2020-01-01 PROCEDURE — 94770: CPT

## 2020-01-01 PROCEDURE — C1889: CPT

## 2020-01-01 PROCEDURE — 83735 ASSAY OF MAGNESIUM: CPT

## 2020-01-01 PROCEDURE — 82962 GLUCOSE BLOOD TEST: CPT

## 2020-01-01 PROCEDURE — 99233 SBSQ HOSP IP/OBS HIGH 50: CPT | Mod: GC

## 2020-01-01 PROCEDURE — C1769: CPT

## 2020-01-01 PROCEDURE — 97163 PT EVAL HIGH COMPLEX 45 MIN: CPT

## 2020-01-01 PROCEDURE — 86923 COMPATIBILITY TEST ELECTRIC: CPT

## 2020-01-01 PROCEDURE — 87070 CULTURE OTHR SPECIMN AEROBIC: CPT

## 2020-01-01 PROCEDURE — 72125 CT NECK SPINE W/O DYE: CPT

## 2020-01-01 PROCEDURE — 73562 X-RAY EXAM OF KNEE 3: CPT | Mod: 26,RT

## 2020-01-01 PROCEDURE — 87040 BLOOD CULTURE FOR BACTERIA: CPT

## 2020-01-01 PROCEDURE — 72128 CT CHEST SPINE W/O DYE: CPT | Mod: 26

## 2020-01-01 PROCEDURE — 87186 SC STD MICRODIL/AGAR DIL: CPT

## 2020-01-01 PROCEDURE — 83540 ASSAY OF IRON: CPT

## 2020-01-01 PROCEDURE — 94003 VENT MGMT INPAT SUBQ DAY: CPT

## 2020-01-01 PROCEDURE — 74177 CT ABD & PELVIS W/CONTRAST: CPT

## 2020-01-01 PROCEDURE — 94799 UNLISTED PULMONARY SVC/PX: CPT

## 2020-01-01 PROCEDURE — 74018 RADEX ABDOMEN 1 VIEW: CPT | Mod: 26

## 2020-01-01 PROCEDURE — 73562 X-RAY EXAM OF KNEE 3: CPT

## 2020-01-01 PROCEDURE — 93005 ELECTROCARDIOGRAM TRACING: CPT

## 2020-01-01 PROCEDURE — 74018 RADEX ABDOMEN 1 VIEW: CPT

## 2020-01-01 PROCEDURE — 70486 CT MAXILLOFACIAL W/O DYE: CPT

## 2020-01-01 PROCEDURE — 83690 ASSAY OF LIPASE: CPT

## 2020-01-01 PROCEDURE — 93306 TTE W/DOPPLER COMPLETE: CPT | Mod: 26

## 2020-01-01 PROCEDURE — 83036 HEMOGLOBIN GLYCOSYLATED A1C: CPT

## 2020-01-01 PROCEDURE — 87635 SARS-COV-2 COVID-19 AMP PRB: CPT

## 2020-01-01 PROCEDURE — 84145 PROCALCITONIN (PCT): CPT

## 2020-01-01 RX ORDER — SODIUM CHLORIDE 9 MG/ML
1000 INJECTION, SOLUTION INTRAVENOUS ONCE
Refills: 0 | Status: COMPLETED | OUTPATIENT
Start: 2020-01-01 | End: 2020-01-01

## 2020-01-01 RX ORDER — CHLORHEXIDINE GLUCONATE 213 G/1000ML
15 SOLUTION TOPICAL EVERY 12 HOURS
Refills: 0 | Status: DISCONTINUED | OUTPATIENT
Start: 2020-01-01 | End: 2020-01-01

## 2020-01-01 RX ORDER — OXYBUTYNIN CHLORIDE 5 MG
1 TABLET ORAL
Qty: 0 | Refills: 0 | DISCHARGE

## 2020-01-01 RX ORDER — HUMAN INSULIN 100 [IU]/ML
8 INJECTION, SUSPENSION SUBCUTANEOUS EVERY 6 HOURS
Refills: 0 | Status: DISCONTINUED | OUTPATIENT
Start: 2020-01-01 | End: 2020-01-01

## 2020-01-01 RX ORDER — ENOXAPARIN SODIUM 100 MG/ML
40 INJECTION SUBCUTANEOUS
Refills: 0 | Status: DISCONTINUED | OUTPATIENT
Start: 2020-01-01 | End: 2020-01-01

## 2020-01-01 RX ORDER — POTASSIUM CHLORIDE 20 MEQ
30 PACKET (EA) ORAL ONCE
Refills: 0 | Status: COMPLETED | OUTPATIENT
Start: 2020-01-01 | End: 2020-01-01

## 2020-01-01 RX ORDER — FENTANYL CITRATE 50 UG/ML
0.5 INJECTION INTRAVENOUS
Qty: 5000 | Refills: 0 | Status: DISCONTINUED | OUTPATIENT
Start: 2020-01-01 | End: 2020-01-01

## 2020-01-01 RX ORDER — SODIUM CHLORIDE 9 MG/ML
1000 INJECTION, SOLUTION INTRAVENOUS
Refills: 0 | Status: DISCONTINUED | OUTPATIENT
Start: 2020-01-01 | End: 2020-01-01

## 2020-01-01 RX ORDER — MORPHINE SULFATE 50 MG/1
0.2 CAPSULE, EXTENDED RELEASE ORAL
Refills: 0 | Status: DISCONTINUED | OUTPATIENT
Start: 2020-01-01 | End: 2020-01-01

## 2020-01-01 RX ORDER — DOPAMINE HYDROCHLORIDE 40 MG/ML
4.51 INJECTION, SOLUTION, CONCENTRATE INTRAVENOUS
Qty: 400 | Refills: 0 | Status: DISCONTINUED | OUTPATIENT
Start: 2020-01-01 | End: 2020-01-01

## 2020-01-01 RX ORDER — ROBINUL 0.2 MG/ML
0.3 INJECTION INTRAMUSCULAR; INTRAVENOUS EVERY 6 HOURS
Refills: 0 | Status: DISCONTINUED | OUTPATIENT
Start: 2020-01-01 | End: 2020-01-01

## 2020-01-01 RX ORDER — HUMAN INSULIN 100 [IU]/ML
14 INJECTION, SUSPENSION SUBCUTANEOUS EVERY 6 HOURS
Refills: 0 | Status: DISCONTINUED | OUTPATIENT
Start: 2020-01-01 | End: 2020-01-01

## 2020-01-01 RX ORDER — DEXMEDETOMIDINE HYDROCHLORIDE IN 0.9% SODIUM CHLORIDE 4 UG/ML
0.2 INJECTION INTRAVENOUS
Qty: 200 | Refills: 0 | Status: DISCONTINUED | OUTPATIENT
Start: 2020-01-01 | End: 2020-01-01

## 2020-01-01 RX ORDER — DEXTROSE 50 % IN WATER 50 %
15 SYRINGE (ML) INTRAVENOUS ONCE
Refills: 0 | Status: DISCONTINUED | OUTPATIENT
Start: 2020-01-01 | End: 2020-01-01

## 2020-01-01 RX ORDER — NOREPINEPHRINE BITARTRATE/D5W 8 MG/250ML
0.05 PLASTIC BAG, INJECTION (ML) INTRAVENOUS
Qty: 8 | Refills: 0 | Status: DISCONTINUED | OUTPATIENT
Start: 2020-01-01 | End: 2020-01-01

## 2020-01-01 RX ORDER — CEFEPIME 1 G/1
2000 INJECTION, POWDER, FOR SOLUTION INTRAMUSCULAR; INTRAVENOUS EVERY 8 HOURS
Refills: 0 | Status: DISCONTINUED | OUTPATIENT
Start: 2020-01-01 | End: 2020-01-01

## 2020-01-01 RX ORDER — HUMAN INSULIN 100 [IU]/ML
6 INJECTION, SUSPENSION SUBCUTANEOUS ONCE
Refills: 0 | Status: COMPLETED | OUTPATIENT
Start: 2020-01-01 | End: 2020-01-01

## 2020-01-01 RX ORDER — HUMAN INSULIN 100 [IU]/ML
6 INJECTION, SUSPENSION SUBCUTANEOUS EVERY 6 HOURS
Refills: 0 | Status: DISCONTINUED | OUTPATIENT
Start: 2020-01-01 | End: 2020-01-01

## 2020-01-01 RX ORDER — CHLORHEXIDINE GLUCONATE 213 G/1000ML
1 SOLUTION TOPICAL
Refills: 0 | Status: DISCONTINUED | OUTPATIENT
Start: 2020-01-01 | End: 2020-01-01

## 2020-01-01 RX ORDER — HUMAN INSULIN 100 [IU]/ML
3 INJECTION, SUSPENSION SUBCUTANEOUS EVERY 8 HOURS
Refills: 0 | Status: DISCONTINUED | OUTPATIENT
Start: 2020-01-01 | End: 2020-01-01

## 2020-01-01 RX ORDER — METFORMIN HYDROCHLORIDE 850 MG/1
1 TABLET ORAL
Qty: 0 | Refills: 0 | DISCHARGE

## 2020-01-01 RX ORDER — HYDROMORPHONE HYDROCHLORIDE 2 MG/ML
0.5 INJECTION INTRAMUSCULAR; INTRAVENOUS; SUBCUTANEOUS ONCE
Refills: 0 | Status: DISCONTINUED | OUTPATIENT
Start: 2020-01-01 | End: 2020-01-01

## 2020-01-01 RX ORDER — HUMAN INSULIN 100 [IU]/ML
16 INJECTION, SUSPENSION SUBCUTANEOUS EVERY 6 HOURS
Refills: 0 | Status: DISCONTINUED | OUTPATIENT
Start: 2020-01-01 | End: 2020-01-01

## 2020-01-01 RX ORDER — DOPAMINE HYDROCHLORIDE 40 MG/ML
2 INJECTION, SOLUTION, CONCENTRATE INTRAVENOUS
Qty: 400 | Refills: 0 | Status: DISCONTINUED | OUTPATIENT
Start: 2020-01-01 | End: 2020-01-01

## 2020-01-01 RX ORDER — HUMAN INSULIN 100 [IU]/ML
12 INJECTION, SUSPENSION SUBCUTANEOUS EVERY 6 HOURS
Refills: 0 | Status: DISCONTINUED | OUTPATIENT
Start: 2020-01-01 | End: 2020-01-01

## 2020-01-01 RX ORDER — CEFEPIME 1 G/1
1000 INJECTION, POWDER, FOR SOLUTION INTRAMUSCULAR; INTRAVENOUS EVERY 8 HOURS
Refills: 0 | Status: DISCONTINUED | OUTPATIENT
Start: 2020-01-01 | End: 2020-01-01

## 2020-01-01 RX ORDER — INSULIN LISPRO 100/ML
VIAL (ML) SUBCUTANEOUS EVERY 6 HOURS
Refills: 0 | Status: DISCONTINUED | OUTPATIENT
Start: 2020-01-01 | End: 2020-01-01

## 2020-01-01 RX ORDER — ACETAMINOPHEN 500 MG
650 TABLET ORAL EVERY 6 HOURS
Refills: 0 | Status: DISCONTINUED | OUTPATIENT
Start: 2020-01-01 | End: 2020-01-01

## 2020-01-01 RX ORDER — DEXTROSE 50 % IN WATER 50 %
25 SYRINGE (ML) INTRAVENOUS ONCE
Refills: 0 | Status: DISCONTINUED | OUTPATIENT
Start: 2020-01-01 | End: 2020-01-01

## 2020-01-01 RX ORDER — HYDROMORPHONE HYDROCHLORIDE 2 MG/ML
0.2 INJECTION INTRAMUSCULAR; INTRAVENOUS; SUBCUTANEOUS
Refills: 0 | Status: DISCONTINUED | OUTPATIENT
Start: 2020-01-01 | End: 2020-01-01

## 2020-01-01 RX ORDER — NOREPINEPHRINE BITARTRATE/D5W 8 MG/250ML
0.05 PLASTIC BAG, INJECTION (ML) INTRAVENOUS
Qty: 16 | Refills: 0 | Status: DISCONTINUED | OUTPATIENT
Start: 2020-01-01 | End: 2020-01-01

## 2020-01-01 RX ORDER — SIMVASTATIN 20 MG/1
1 TABLET, FILM COATED ORAL
Qty: 0 | Refills: 0 | DISCHARGE

## 2020-01-01 RX ORDER — PIPERACILLIN AND TAZOBACTAM 4; .5 G/20ML; G/20ML
3.38 INJECTION, POWDER, LYOPHILIZED, FOR SOLUTION INTRAVENOUS ONCE
Refills: 0 | Status: COMPLETED | OUTPATIENT
Start: 2020-01-01 | End: 2020-01-01

## 2020-01-01 RX ORDER — CALCIUM GLUCONATE 100 MG/ML
1 VIAL (ML) INTRAVENOUS ONCE
Refills: 0 | Status: COMPLETED | OUTPATIENT
Start: 2020-01-01 | End: 2020-01-01

## 2020-01-01 RX ORDER — PANTOPRAZOLE SODIUM 20 MG/1
40 TABLET, DELAYED RELEASE ORAL DAILY
Refills: 0 | Status: DISCONTINUED | OUTPATIENT
Start: 2020-01-01 | End: 2020-01-01

## 2020-01-01 RX ORDER — SODIUM CHLORIDE 5 G/100ML
1000 INJECTION, SOLUTION INTRAVENOUS
Refills: 0 | Status: DISCONTINUED | OUTPATIENT
Start: 2020-01-01 | End: 2020-01-01

## 2020-01-01 RX ORDER — PIPERACILLIN AND TAZOBACTAM 4; .5 G/20ML; G/20ML
3.38 INJECTION, POWDER, LYOPHILIZED, FOR SOLUTION INTRAVENOUS EVERY 6 HOURS
Refills: 0 | Status: DISCONTINUED | OUTPATIENT
Start: 2020-01-01 | End: 2020-01-01

## 2020-01-01 RX ORDER — POTASSIUM CHLORIDE 20 MEQ
40 PACKET (EA) ORAL ONCE
Refills: 0 | Status: COMPLETED | OUTPATIENT
Start: 2020-01-01 | End: 2020-01-01

## 2020-01-01 RX ORDER — SODIUM CHLORIDE 9 MG/ML
1000 INJECTION INTRAMUSCULAR; INTRAVENOUS; SUBCUTANEOUS
Refills: 0 | Status: DISCONTINUED | OUTPATIENT
Start: 2020-01-01 | End: 2020-01-01

## 2020-01-01 RX ORDER — DEXTROSE MONOHYDRATE, SODIUM CHLORIDE, AND POTASSIUM CHLORIDE 50; .745; 4.5 G/1000ML; G/1000ML; G/1000ML
1000 INJECTION, SOLUTION INTRAVENOUS
Refills: 0 | Status: DISCONTINUED | OUTPATIENT
Start: 2020-01-01 | End: 2020-01-01

## 2020-01-01 RX ORDER — SODIUM CHLORIDE 9 MG/ML
10 INJECTION INTRAMUSCULAR; INTRAVENOUS; SUBCUTANEOUS
Refills: 0 | Status: DISCONTINUED | OUTPATIENT
Start: 2020-01-01 | End: 2020-01-01

## 2020-01-01 RX ORDER — TETANUS TOXOID, REDUCED DIPHTHERIA TOXOID AND ACELLULAR PERTUSSIS VACCINE, ADSORBED 5; 2.5; 8; 8; 2.5 [IU]/.5ML; [IU]/.5ML; UG/.5ML; UG/.5ML; UG/.5ML
0.5 SUSPENSION INTRAMUSCULAR ONCE
Refills: 0 | Status: DISCONTINUED | OUTPATIENT
Start: 2020-01-01 | End: 2020-01-01

## 2020-01-01 RX ORDER — FUROSEMIDE 40 MG
10 TABLET ORAL ONCE
Refills: 0 | Status: DISCONTINUED | OUTPATIENT
Start: 2020-01-01 | End: 2020-01-01

## 2020-01-01 RX ORDER — INSULIN LISPRO 100/ML
VIAL (ML) SUBCUTANEOUS EVERY 4 HOURS
Refills: 0 | Status: DISCONTINUED | OUTPATIENT
Start: 2020-01-01 | End: 2020-01-01

## 2020-01-01 RX ORDER — MIDAZOLAM HYDROCHLORIDE 1 MG/ML
1 INJECTION, SOLUTION INTRAMUSCULAR; INTRAVENOUS ONCE
Refills: 0 | Status: DISCONTINUED | OUTPATIENT
Start: 2020-01-01 | End: 2020-01-01

## 2020-01-01 RX ORDER — MAGNESIUM SULFATE 500 MG/ML
1 VIAL (ML) INJECTION ONCE
Refills: 0 | Status: COMPLETED | OUTPATIENT
Start: 2020-01-01 | End: 2020-01-01

## 2020-01-01 RX ORDER — DEXAMETHASONE 0.5 MG/5ML
10 ELIXIR ORAL ONCE
Refills: 0 | Status: COMPLETED | OUTPATIENT
Start: 2020-01-01 | End: 2020-01-01

## 2020-01-01 RX ORDER — DOXAZOSIN MESYLATE 4 MG
2 TABLET ORAL AT BEDTIME
Refills: 0 | Status: DISCONTINUED | OUTPATIENT
Start: 2020-01-01 | End: 2020-01-01

## 2020-01-01 RX ORDER — ACETAMINOPHEN 500 MG
1000 TABLET ORAL ONCE
Refills: 0 | Status: DISCONTINUED | OUTPATIENT
Start: 2020-01-01 | End: 2020-01-01

## 2020-01-01 RX ORDER — IPRATROPIUM/ALBUTEROL SULFATE 18-103MCG
3 AEROSOL WITH ADAPTER (GRAM) INHALATION EVERY 6 HOURS
Refills: 0 | Status: DISCONTINUED | OUTPATIENT
Start: 2020-01-01 | End: 2020-01-01

## 2020-01-01 RX ORDER — PHENYLEPHRINE HYDROCHLORIDE 10 MG/ML
0.1 INJECTION INTRAVENOUS
Qty: 40 | Refills: 0 | Status: DISCONTINUED | OUTPATIENT
Start: 2020-01-01 | End: 2020-01-01

## 2020-01-01 RX ORDER — GLUCAGON INJECTION, SOLUTION 0.5 MG/.1ML
1 INJECTION, SOLUTION SUBCUTANEOUS ONCE
Refills: 0 | Status: DISCONTINUED | OUTPATIENT
Start: 2020-01-01 | End: 2020-01-01

## 2020-01-01 RX ORDER — HUMAN INSULIN 100 [IU]/ML
5 INJECTION, SUSPENSION SUBCUTANEOUS EVERY 6 HOURS
Refills: 0 | Status: DISCONTINUED | OUTPATIENT
Start: 2020-01-01 | End: 2020-01-01

## 2020-01-01 RX ORDER — CEFAZOLIN SODIUM 1 G
2000 VIAL (EA) INJECTION ONCE
Refills: 0 | Status: COMPLETED | OUTPATIENT
Start: 2020-01-01 | End: 2020-01-01

## 2020-01-01 RX ORDER — INSULIN HUMAN 100 [IU]/ML
2 INJECTION, SOLUTION SUBCUTANEOUS
Qty: 100 | Refills: 0 | Status: DISCONTINUED | OUTPATIENT
Start: 2020-01-01 | End: 2020-01-01

## 2020-01-01 RX ORDER — ONDANSETRON 8 MG/1
4 TABLET, FILM COATED ORAL EVERY 6 HOURS
Refills: 0 | Status: DISCONTINUED | OUTPATIENT
Start: 2020-01-01 | End: 2020-01-01

## 2020-01-01 RX ORDER — BACITRACIN ZINC 500 UNIT/G
1 OINTMENT IN PACKET (EA) TOPICAL
Refills: 0 | Status: DISCONTINUED | OUTPATIENT
Start: 2020-01-01 | End: 2020-01-01

## 2020-01-01 RX ORDER — HUMAN INSULIN 100 [IU]/ML
10 INJECTION, SUSPENSION SUBCUTANEOUS EVERY 6 HOURS
Refills: 0 | Status: DISCONTINUED | OUTPATIENT
Start: 2020-01-01 | End: 2020-01-01

## 2020-01-01 RX ORDER — POTASSIUM CHLORIDE 20 MEQ
10 PACKET (EA) ORAL ONCE
Refills: 0 | Status: DISCONTINUED | OUTPATIENT
Start: 2020-01-01 | End: 2020-01-01

## 2020-01-01 RX ORDER — AMLODIPINE BESYLATE 2.5 MG/1
1 TABLET ORAL
Qty: 0 | Refills: 0 | DISCHARGE

## 2020-01-01 RX ORDER — SODIUM CHLORIDE 9 MG/ML
500 INJECTION INTRAMUSCULAR; INTRAVENOUS; SUBCUTANEOUS ONCE
Refills: 0 | Status: DISCONTINUED | OUTPATIENT
Start: 2020-01-01 | End: 2020-01-01

## 2020-01-01 RX ORDER — SODIUM CHLORIDE 9 MG/ML
1000 INJECTION INTRAMUSCULAR; INTRAVENOUS; SUBCUTANEOUS ONCE
Refills: 0 | Status: COMPLETED | OUTPATIENT
Start: 2020-01-01 | End: 2020-01-01

## 2020-01-01 RX ORDER — TAMSULOSIN HYDROCHLORIDE 0.4 MG/1
1 CAPSULE ORAL
Qty: 0 | Refills: 0 | DISCHARGE

## 2020-01-01 RX ORDER — CALCIUM GLUCONATE 100 MG/ML
2 VIAL (ML) INTRAVENOUS ONCE
Refills: 0 | Status: COMPLETED | OUTPATIENT
Start: 2020-01-01 | End: 2020-01-01

## 2020-01-01 RX ORDER — FUROSEMIDE 40 MG
10 TABLET ORAL ONCE
Refills: 0 | Status: COMPLETED | OUTPATIENT
Start: 2020-01-01 | End: 2020-01-01

## 2020-01-01 RX ORDER — OXYCODONE HYDROCHLORIDE 5 MG/1
5 TABLET ORAL EVERY 4 HOURS
Refills: 0 | Status: DISCONTINUED | OUTPATIENT
Start: 2020-01-01 | End: 2020-01-01

## 2020-01-01 RX ORDER — SODIUM CHLORIDE 9 MG/ML
3 INJECTION INTRAMUSCULAR; INTRAVENOUS; SUBCUTANEOUS EVERY 6 HOURS
Refills: 0 | Status: DISCONTINUED | OUTPATIENT
Start: 2020-01-01 | End: 2020-01-01

## 2020-01-01 RX ORDER — FENTANYL CITRATE 50 UG/ML
50 INJECTION INTRAVENOUS ONCE
Refills: 0 | Status: DISCONTINUED | OUTPATIENT
Start: 2020-01-01 | End: 2020-01-01

## 2020-01-01 RX ORDER — PIPERACILLIN AND TAZOBACTAM 4; .5 G/20ML; G/20ML
3.38 INJECTION, POWDER, LYOPHILIZED, FOR SOLUTION INTRAVENOUS ONCE
Refills: 0 | Status: DISCONTINUED | OUTPATIENT
Start: 2020-01-01 | End: 2020-01-01

## 2020-01-01 RX ORDER — POTASSIUM PHOSPHATE, MONOBASIC POTASSIUM PHOSPHATE, DIBASIC 236; 224 MG/ML; MG/ML
15 INJECTION, SOLUTION INTRAVENOUS ONCE
Refills: 0 | Status: COMPLETED | OUTPATIENT
Start: 2020-01-01 | End: 2020-01-01

## 2020-01-01 RX ORDER — INFLUENZA VIRUS VACCINE 15; 15; 15; 15 UG/.5ML; UG/.5ML; UG/.5ML; UG/.5ML
0.5 SUSPENSION INTRAMUSCULAR ONCE
Refills: 0 | Status: DISCONTINUED | OUTPATIENT
Start: 2020-01-01 | End: 2020-01-01

## 2020-01-01 RX ORDER — FENTANYL CITRATE 50 UG/ML
1.51 INJECTION INTRAVENOUS
Qty: 5000 | Refills: 0 | Status: DISCONTINUED | OUTPATIENT
Start: 2020-01-01 | End: 2020-01-01

## 2020-01-01 RX ORDER — OXYCODONE HYDROCHLORIDE 5 MG/1
10 TABLET ORAL EVERY 4 HOURS
Refills: 0 | Status: DISCONTINUED | OUTPATIENT
Start: 2020-01-01 | End: 2020-01-01

## 2020-01-01 RX ORDER — ASCORBIC ACID 60 MG
500 TABLET,CHEWABLE ORAL DAILY
Refills: 0 | Status: DISCONTINUED | OUTPATIENT
Start: 2020-01-01 | End: 2020-01-01

## 2020-01-01 RX ORDER — INSULIN LISPRO 100/ML
VIAL (ML) SUBCUTANEOUS
Refills: 0 | Status: DISCONTINUED | OUTPATIENT
Start: 2020-01-01 | End: 2020-01-01

## 2020-01-01 RX ORDER — FENTANYL CITRATE 50 UG/ML
12.5 INJECTION INTRAVENOUS
Refills: 0 | Status: DISCONTINUED | OUTPATIENT
Start: 2020-01-01 | End: 2020-01-01

## 2020-01-01 RX ORDER — SENNA PLUS 8.6 MG/1
2 TABLET ORAL AT BEDTIME
Refills: 0 | Status: DISCONTINUED | OUTPATIENT
Start: 2020-01-01 | End: 2020-01-01

## 2020-01-01 RX ORDER — PIPERACILLIN AND TAZOBACTAM 4; .5 G/20ML; G/20ML
3.38 INJECTION, POWDER, LYOPHILIZED, FOR SOLUTION INTRAVENOUS EVERY 8 HOURS
Refills: 0 | Status: DISCONTINUED | OUTPATIENT
Start: 2020-01-01 | End: 2020-01-01

## 2020-01-01 RX ORDER — POTASSIUM CHLORIDE 20 MEQ
10 PACKET (EA) ORAL
Refills: 0 | Status: DISCONTINUED | OUTPATIENT
Start: 2020-01-01 | End: 2020-01-01

## 2020-01-01 RX ORDER — HUMAN INSULIN 100 [IU]/ML
16 INJECTION, SUSPENSION SUBCUTANEOUS ONCE
Refills: 0 | Status: COMPLETED | OUTPATIENT
Start: 2020-01-01 | End: 2020-01-01

## 2020-01-01 RX ORDER — HUMAN INSULIN 100 [IU]/ML
12 INJECTION, SUSPENSION SUBCUTANEOUS ONCE
Refills: 0 | Status: COMPLETED | OUTPATIENT
Start: 2020-01-01 | End: 2020-01-01

## 2020-01-01 RX ORDER — DEXTROSE 50 % IN WATER 50 %
12.5 SYRINGE (ML) INTRAVENOUS ONCE
Refills: 0 | Status: DISCONTINUED | OUTPATIENT
Start: 2020-01-01 | End: 2020-01-01

## 2020-01-01 RX ORDER — HUMAN INSULIN 100 [IU]/ML
15 INJECTION, SUSPENSION SUBCUTANEOUS EVERY 6 HOURS
Refills: 0 | Status: DISCONTINUED | OUTPATIENT
Start: 2020-01-01 | End: 2020-01-01

## 2020-01-01 RX ORDER — METOCLOPRAMIDE HCL 10 MG
10 TABLET ORAL ONCE
Refills: 0 | Status: COMPLETED | OUTPATIENT
Start: 2020-01-01 | End: 2020-01-01

## 2020-01-01 RX ORDER — HALOPERIDOL DECANOATE 100 MG/ML
0.5 INJECTION INTRAMUSCULAR EVERY 6 HOURS
Refills: 0 | Status: DISCONTINUED | OUTPATIENT
Start: 2020-01-01 | End: 2020-01-01

## 2020-01-01 RX ORDER — FERROUS SULFATE 325(65) MG
325 TABLET ORAL DAILY
Refills: 0 | Status: DISCONTINUED | OUTPATIENT
Start: 2020-01-01 | End: 2020-01-01

## 2020-01-01 RX ORDER — HUMAN INSULIN 100 [IU]/ML
4 INJECTION, SUSPENSION SUBCUTANEOUS EVERY 6 HOURS
Refills: 0 | Status: DISCONTINUED | OUTPATIENT
Start: 2020-01-01 | End: 2020-01-01

## 2020-01-01 RX ORDER — GLIMEPIRIDE 1 MG
3 TABLET ORAL
Qty: 0 | Refills: 0 | DISCHARGE

## 2020-01-01 RX ORDER — HUMAN INSULIN 100 [IU]/ML
10 INJECTION, SUSPENSION SUBCUTANEOUS ONCE
Refills: 0 | Status: COMPLETED | OUTPATIENT
Start: 2020-01-01 | End: 2020-01-01

## 2020-01-01 RX ORDER — POLYETHYLENE GLYCOL 3350 17 G/17G
17 POWDER, FOR SOLUTION ORAL
Refills: 0 | Status: DISCONTINUED | OUTPATIENT
Start: 2020-01-01 | End: 2020-01-01

## 2020-01-01 RX ADMIN — Medication 650 MILLIGRAM(S): at 03:10

## 2020-01-01 RX ADMIN — SODIUM CHLORIDE 3 MILLILITER(S): 9 INJECTION INTRAMUSCULAR; INTRAVENOUS; SUBCUTANEOUS at 05:11

## 2020-01-01 RX ADMIN — FENTANYL CITRATE 12.5 MICROGRAM(S): 50 INJECTION INTRAVENOUS at 21:50

## 2020-01-01 RX ADMIN — DOPAMINE HYDROCHLORIDE 13.7 MICROGRAM(S)/KG/MIN: 40 INJECTION, SOLUTION, CONCENTRATE INTRAVENOUS at 18:31

## 2020-01-01 RX ADMIN — CHLORHEXIDINE GLUCONATE 1 APPLICATION(S): 213 SOLUTION TOPICAL at 05:15

## 2020-01-01 RX ADMIN — FENTANYL CITRATE 6.1 MICROGRAM(S)/KG/HR: 50 INJECTION INTRAVENOUS at 07:31

## 2020-01-01 RX ADMIN — HUMAN INSULIN 12 UNIT(S): 100 INJECTION, SUSPENSION SUBCUTANEOUS at 18:30

## 2020-01-01 RX ADMIN — Medication 3 MILLILITER(S): at 05:31

## 2020-01-01 RX ADMIN — HUMAN INSULIN 4 UNIT(S): 100 INJECTION, SUSPENSION SUBCUTANEOUS at 22:48

## 2020-01-01 RX ADMIN — HUMAN INSULIN 16 UNIT(S): 100 INJECTION, SUSPENSION SUBCUTANEOUS at 18:30

## 2020-01-01 RX ADMIN — Medication 1 APPLICATION(S): at 18:05

## 2020-01-01 RX ADMIN — Medication 3 MILLILITER(S): at 05:13

## 2020-01-01 RX ADMIN — SODIUM CHLORIDE 3 MILLILITER(S): 9 INJECTION INTRAMUSCULAR; INTRAVENOUS; SUBCUTANEOUS at 05:31

## 2020-01-01 RX ADMIN — SODIUM CHLORIDE 3 MILLILITER(S): 9 INJECTION INTRAMUSCULAR; INTRAVENOUS; SUBCUTANEOUS at 23:43

## 2020-01-01 RX ADMIN — ENOXAPARIN SODIUM 40 MILLIGRAM(S): 100 INJECTION SUBCUTANEOUS at 18:06

## 2020-01-01 RX ADMIN — CHLORHEXIDINE GLUCONATE 15 MILLILITER(S): 213 SOLUTION TOPICAL at 18:32

## 2020-01-01 RX ADMIN — SODIUM CHLORIDE 40 MILLILITER(S): 5 INJECTION, SOLUTION INTRAVENOUS at 18:03

## 2020-01-01 RX ADMIN — HUMAN INSULIN 16 UNIT(S): 100 INJECTION, SUSPENSION SUBCUTANEOUS at 05:19

## 2020-01-01 RX ADMIN — HUMAN INSULIN 8 UNIT(S): 100 INJECTION, SUSPENSION SUBCUTANEOUS at 05:14

## 2020-01-01 RX ADMIN — CEFEPIME 100 MILLIGRAM(S): 1 INJECTION, POWDER, FOR SOLUTION INTRAMUSCULAR; INTRAVENOUS at 14:27

## 2020-01-01 RX ADMIN — DOPAMINE HYDROCHLORIDE 13.7 MICROGRAM(S)/KG/MIN: 40 INJECTION, SOLUTION, CONCENTRATE INTRAVENOUS at 07:57

## 2020-01-01 RX ADMIN — SODIUM CHLORIDE 3 MILLILITER(S): 9 INJECTION INTRAMUSCULAR; INTRAVENOUS; SUBCUTANEOUS at 17:33

## 2020-01-01 RX ADMIN — CHLORHEXIDINE GLUCONATE 15 MILLILITER(S): 213 SOLUTION TOPICAL at 05:15

## 2020-01-01 RX ADMIN — FENTANYL CITRATE 12.5 MICROGRAM(S): 50 INJECTION INTRAVENOUS at 04:45

## 2020-01-01 RX ADMIN — Medication 0.5 MILLIGRAM(S): at 12:18

## 2020-01-01 RX ADMIN — Medication 500 MILLIGRAM(S): at 13:38

## 2020-01-01 RX ADMIN — MIDAZOLAM HYDROCHLORIDE 1 MILLIGRAM(S): 1 INJECTION, SOLUTION INTRAMUSCULAR; INTRAVENOUS at 15:00

## 2020-01-01 RX ADMIN — HUMAN INSULIN 10 UNIT(S): 100 INJECTION, SUSPENSION SUBCUTANEOUS at 00:41

## 2020-01-01 RX ADMIN — HUMAN INSULIN 16 UNIT(S): 100 INJECTION, SUSPENSION SUBCUTANEOUS at 23:55

## 2020-01-01 RX ADMIN — ONDANSETRON 4 MILLIGRAM(S): 8 TABLET, FILM COATED ORAL at 20:00

## 2020-01-01 RX ADMIN — Medication 3 MILLILITER(S): at 05:49

## 2020-01-01 RX ADMIN — Medication 10 MILLIGRAM(S): at 15:40

## 2020-01-01 RX ADMIN — Medication 3.8 MICROGRAM(S)/KG/MIN: at 03:26

## 2020-01-01 RX ADMIN — FENTANYL CITRATE 6.1 MICROGRAM(S)/KG/HR: 50 INJECTION INTRAVENOUS at 08:58

## 2020-01-01 RX ADMIN — Medication 4: at 05:29

## 2020-01-01 RX ADMIN — HUMAN INSULIN 16 UNIT(S): 100 INJECTION, SUSPENSION SUBCUTANEOUS at 17:37

## 2020-01-01 RX ADMIN — CHLORHEXIDINE GLUCONATE 15 MILLILITER(S): 213 SOLUTION TOPICAL at 06:21

## 2020-01-01 RX ADMIN — Medication 500 MILLIGRAM(S): at 11:11

## 2020-01-01 RX ADMIN — Medication 2: at 22:48

## 2020-01-01 RX ADMIN — Medication 4: at 17:38

## 2020-01-01 RX ADMIN — Medication 1 APPLICATION(S): at 05:36

## 2020-01-01 RX ADMIN — FENTANYL CITRATE 12.5 MICROGRAM(S): 50 INJECTION INTRAVENOUS at 13:30

## 2020-01-01 RX ADMIN — FENTANYL CITRATE 2.03 MICROGRAM(S)/KG/HR: 50 INJECTION INTRAVENOUS at 09:28

## 2020-01-01 RX ADMIN — SODIUM CHLORIDE 100 MILLILITER(S): 9 INJECTION INTRAMUSCULAR; INTRAVENOUS; SUBCUTANEOUS at 03:26

## 2020-01-01 RX ADMIN — DOPAMINE HYDROCHLORIDE 6.08 MICROGRAM(S)/KG/MIN: 40 INJECTION, SOLUTION, CONCENTRATE INTRAVENOUS at 09:27

## 2020-01-01 RX ADMIN — CHLORHEXIDINE GLUCONATE 1 APPLICATION(S): 213 SOLUTION TOPICAL at 06:03

## 2020-01-01 RX ADMIN — FENTANYL CITRATE 6.1 MICROGRAM(S)/KG/HR: 50 INJECTION INTRAVENOUS at 20:02

## 2020-01-01 RX ADMIN — HUMAN INSULIN 16 UNIT(S): 100 INJECTION, SUSPENSION SUBCUTANEOUS at 01:30

## 2020-01-01 RX ADMIN — SODIUM CHLORIDE 40 MILLILITER(S): 5 INJECTION, SOLUTION INTRAVENOUS at 19:01

## 2020-01-01 RX ADMIN — Medication 2: at 23:54

## 2020-01-01 RX ADMIN — FENTANYL CITRATE 12.5 MICROGRAM(S): 50 INJECTION INTRAVENOUS at 11:52

## 2020-01-01 RX ADMIN — FENTANYL CITRATE 6.1 MICROGRAM(S)/KG/HR: 50 INJECTION INTRAVENOUS at 18:30

## 2020-01-01 RX ADMIN — Medication 325 MILLIGRAM(S): at 11:11

## 2020-01-01 RX ADMIN — SODIUM CHLORIDE 3 MILLILITER(S): 9 INJECTION INTRAMUSCULAR; INTRAVENOUS; SUBCUTANEOUS at 13:18

## 2020-01-01 RX ADMIN — Medication 2: at 17:34

## 2020-01-01 RX ADMIN — Medication 4: at 13:11

## 2020-01-01 RX ADMIN — Medication 6: at 05:58

## 2020-01-01 RX ADMIN — PIPERACILLIN AND TAZOBACTAM 25 GRAM(S): 4; .5 INJECTION, POWDER, LYOPHILIZED, FOR SOLUTION INTRAVENOUS at 21:04

## 2020-01-01 RX ADMIN — HUMAN INSULIN 16 UNIT(S): 100 INJECTION, SUSPENSION SUBCUTANEOUS at 18:36

## 2020-01-01 RX ADMIN — HUMAN INSULIN 16 UNIT(S): 100 INJECTION, SUSPENSION SUBCUTANEOUS at 00:00

## 2020-01-01 RX ADMIN — Medication 4: at 19:51

## 2020-01-01 RX ADMIN — Medication 0.5 MILLIGRAM(S): at 05:53

## 2020-01-01 RX ADMIN — Medication 7.59 MICROGRAM(S)/KG/MIN: at 20:36

## 2020-01-01 RX ADMIN — SENNA PLUS 2 TABLET(S): 8.6 TABLET ORAL at 22:48

## 2020-01-01 RX ADMIN — FENTANYL CITRATE 12.5 MICROGRAM(S): 50 INJECTION INTRAVENOUS at 13:15

## 2020-01-01 RX ADMIN — Medication 3 MILLILITER(S): at 00:36

## 2020-01-01 RX ADMIN — SODIUM CHLORIDE 3 MILLILITER(S): 9 INJECTION INTRAMUSCULAR; INTRAVENOUS; SUBCUTANEOUS at 11:21

## 2020-01-01 RX ADMIN — PANTOPRAZOLE SODIUM 40 MILLIGRAM(S): 20 TABLET, DELAYED RELEASE ORAL at 11:20

## 2020-01-01 RX ADMIN — ENOXAPARIN SODIUM 40 MILLIGRAM(S): 100 INJECTION SUBCUTANEOUS at 17:57

## 2020-01-01 RX ADMIN — CEFEPIME 100 MILLIGRAM(S): 1 INJECTION, POWDER, FOR SOLUTION INTRAMUSCULAR; INTRAVENOUS at 13:12

## 2020-01-01 RX ADMIN — Medication 1 APPLICATION(S): at 05:31

## 2020-01-01 RX ADMIN — CHLORHEXIDINE GLUCONATE 1 APPLICATION(S): 213 SOLUTION TOPICAL at 22:47

## 2020-01-01 RX ADMIN — INSULIN HUMAN 2 UNIT(S)/HR: 100 INJECTION, SOLUTION SUBCUTANEOUS at 07:30

## 2020-01-01 RX ADMIN — Medication 0.5 MILLIGRAM(S): at 07:20

## 2020-01-01 RX ADMIN — SODIUM CHLORIDE 3 MILLILITER(S): 9 INJECTION INTRAMUSCULAR; INTRAVENOUS; SUBCUTANEOUS at 17:13

## 2020-01-01 RX ADMIN — Medication 1 APPLICATION(S): at 06:22

## 2020-01-01 RX ADMIN — PIPERACILLIN AND TAZOBACTAM 25 GRAM(S): 4; .5 INJECTION, POWDER, LYOPHILIZED, FOR SOLUTION INTRAVENOUS at 16:26

## 2020-01-01 RX ADMIN — POLYETHYLENE GLYCOL 3350 17 GRAM(S): 17 POWDER, FOR SOLUTION ORAL at 05:30

## 2020-01-01 RX ADMIN — HUMAN INSULIN 16 UNIT(S): 100 INJECTION, SUSPENSION SUBCUTANEOUS at 12:43

## 2020-01-01 RX ADMIN — CEFEPIME 100 MILLIGRAM(S): 1 INJECTION, POWDER, FOR SOLUTION INTRAMUSCULAR; INTRAVENOUS at 22:26

## 2020-01-01 RX ADMIN — PIPERACILLIN AND TAZOBACTAM 25 GRAM(S): 4; .5 INJECTION, POWDER, LYOPHILIZED, FOR SOLUTION INTRAVENOUS at 13:12

## 2020-01-01 RX ADMIN — SODIUM CHLORIDE 3 MILLILITER(S): 9 INJECTION INTRAMUSCULAR; INTRAVENOUS; SUBCUTANEOUS at 00:21

## 2020-01-01 RX ADMIN — POLYETHYLENE GLYCOL 3350 17 GRAM(S): 17 POWDER, FOR SOLUTION ORAL at 05:15

## 2020-01-01 RX ADMIN — SODIUM CHLORIDE 100 MILLILITER(S): 9 INJECTION, SOLUTION INTRAVENOUS at 07:30

## 2020-01-01 RX ADMIN — Medication 1 APPLICATION(S): at 17:37

## 2020-01-01 RX ADMIN — Medication 4: at 05:14

## 2020-01-01 RX ADMIN — CHLORHEXIDINE GLUCONATE 1 APPLICATION(S): 213 SOLUTION TOPICAL at 22:59

## 2020-01-01 RX ADMIN — Medication 1 APPLICATION(S): at 18:35

## 2020-01-01 RX ADMIN — HUMAN INSULIN 6 UNIT(S): 100 INJECTION, SUSPENSION SUBCUTANEOUS at 00:42

## 2020-01-01 RX ADMIN — Medication 4: at 17:50

## 2020-01-01 RX ADMIN — PIPERACILLIN AND TAZOBACTAM 25 GRAM(S): 4; .5 INJECTION, POWDER, LYOPHILIZED, FOR SOLUTION INTRAVENOUS at 21:03

## 2020-01-01 RX ADMIN — SODIUM CHLORIDE 3 MILLILITER(S): 9 INJECTION INTRAMUSCULAR; INTRAVENOUS; SUBCUTANEOUS at 17:09

## 2020-01-01 RX ADMIN — Medication 100 GRAM(S): at 04:13

## 2020-01-01 RX ADMIN — SODIUM CHLORIDE 3 MILLILITER(S): 9 INJECTION INTRAMUSCULAR; INTRAVENOUS; SUBCUTANEOUS at 23:51

## 2020-01-01 RX ADMIN — HUMAN INSULIN 16 UNIT(S): 100 INJECTION, SUSPENSION SUBCUTANEOUS at 17:11

## 2020-01-01 RX ADMIN — OXYCODONE HYDROCHLORIDE 5 MILLIGRAM(S): 5 TABLET ORAL at 21:02

## 2020-01-01 RX ADMIN — Medication 85 MILLIMOLE(S): at 03:00

## 2020-01-01 RX ADMIN — POLYETHYLENE GLYCOL 3350 17 GRAM(S): 17 POWDER, FOR SOLUTION ORAL at 06:02

## 2020-01-01 RX ADMIN — Medication 100 MILLIGRAM(S): at 11:20

## 2020-01-01 RX ADMIN — CHLORHEXIDINE GLUCONATE 15 MILLILITER(S): 213 SOLUTION TOPICAL at 06:08

## 2020-01-01 RX ADMIN — Medication 325 MILLIGRAM(S): at 12:23

## 2020-01-01 RX ADMIN — HALOPERIDOL DECANOATE 0.5 MILLIGRAM(S): 100 INJECTION INTRAMUSCULAR at 12:46

## 2020-01-01 RX ADMIN — POLYETHYLENE GLYCOL 3350 17 GRAM(S): 17 POWDER, FOR SOLUTION ORAL at 17:38

## 2020-01-01 RX ADMIN — DOPAMINE HYDROCHLORIDE 13.7 MICROGRAM(S)/KG/MIN: 40 INJECTION, SOLUTION, CONCENTRATE INTRAVENOUS at 05:45

## 2020-01-01 RX ADMIN — PANTOPRAZOLE SODIUM 40 MILLIGRAM(S): 20 TABLET, DELAYED RELEASE ORAL at 13:11

## 2020-01-01 RX ADMIN — SODIUM CHLORIDE 3 MILLILITER(S): 9 INJECTION INTRAMUSCULAR; INTRAVENOUS; SUBCUTANEOUS at 17:15

## 2020-01-01 RX ADMIN — Medication 0.5 MILLIGRAM(S): at 15:46

## 2020-01-01 RX ADMIN — Medication 1 APPLICATION(S): at 18:16

## 2020-01-01 RX ADMIN — PANTOPRAZOLE SODIUM 40 MILLIGRAM(S): 20 TABLET, DELAYED RELEASE ORAL at 11:11

## 2020-01-01 RX ADMIN — DOPAMINE HYDROCHLORIDE 13.7 MICROGRAM(S)/KG/MIN: 40 INJECTION, SOLUTION, CONCENTRATE INTRAVENOUS at 14:00

## 2020-01-01 RX ADMIN — CHLORHEXIDINE GLUCONATE 15 MILLILITER(S): 213 SOLUTION TOPICAL at 17:38

## 2020-01-01 RX ADMIN — SODIUM CHLORIDE 3 MILLILITER(S): 9 INJECTION INTRAMUSCULAR; INTRAVENOUS; SUBCUTANEOUS at 05:50

## 2020-01-01 RX ADMIN — PIPERACILLIN AND TAZOBACTAM 25 GRAM(S): 4; .5 INJECTION, POWDER, LYOPHILIZED, FOR SOLUTION INTRAVENOUS at 05:11

## 2020-01-01 RX ADMIN — HYDROMORPHONE HYDROCHLORIDE 0.2 MILLIGRAM(S): 2 INJECTION INTRAMUSCULAR; INTRAVENOUS; SUBCUTANEOUS at 12:47

## 2020-01-01 RX ADMIN — CHLORHEXIDINE GLUCONATE 15 MILLILITER(S): 213 SOLUTION TOPICAL at 05:36

## 2020-01-01 RX ADMIN — SODIUM CHLORIDE 3 MILLILITER(S): 9 INJECTION INTRAMUSCULAR; INTRAVENOUS; SUBCUTANEOUS at 06:02

## 2020-01-01 RX ADMIN — CHLORHEXIDINE GLUCONATE 1 APPLICATION(S): 213 SOLUTION TOPICAL at 05:30

## 2020-01-01 RX ADMIN — ENOXAPARIN SODIUM 40 MILLIGRAM(S): 100 INJECTION SUBCUTANEOUS at 18:17

## 2020-01-01 RX ADMIN — DEXMEDETOMIDINE HYDROCHLORIDE IN 0.9% SODIUM CHLORIDE 4.05 MICROGRAM(S)/KG/HR: 4 INJECTION INTRAVENOUS at 04:13

## 2020-01-01 RX ADMIN — Medication 1 APPLICATION(S): at 05:16

## 2020-01-01 RX ADMIN — Medication 3.8 MICROGRAM(S)/KG/MIN: at 19:01

## 2020-01-01 RX ADMIN — CHLORHEXIDINE GLUCONATE 15 MILLILITER(S): 213 SOLUTION TOPICAL at 05:16

## 2020-01-01 RX ADMIN — Medication 3 MILLILITER(S): at 23:06

## 2020-01-01 RX ADMIN — FENTANYL CITRATE 2.03 MICROGRAM(S)/KG/HR: 50 INJECTION INTRAVENOUS at 22:27

## 2020-01-01 RX ADMIN — CHLORHEXIDINE GLUCONATE 15 MILLILITER(S): 213 SOLUTION TOPICAL at 18:16

## 2020-01-01 RX ADMIN — Medication 10 MILLIGRAM(S): at 17:49

## 2020-01-01 RX ADMIN — CHLORHEXIDINE GLUCONATE 15 MILLILITER(S): 213 SOLUTION TOPICAL at 05:31

## 2020-01-01 RX ADMIN — HUMAN INSULIN 16 UNIT(S): 100 INJECTION, SUSPENSION SUBCUTANEOUS at 06:21

## 2020-01-01 RX ADMIN — FENTANYL CITRATE 12.5 MICROGRAM(S): 50 INJECTION INTRAVENOUS at 05:00

## 2020-01-01 RX ADMIN — Medication 3 MILLILITER(S): at 11:18

## 2020-01-01 RX ADMIN — SENNA PLUS 2 TABLET(S): 8.6 TABLET ORAL at 21:33

## 2020-01-01 RX ADMIN — Medication 2 MILLIGRAM(S): at 21:03

## 2020-01-01 RX ADMIN — PANTOPRAZOLE SODIUM 40 MILLIGRAM(S): 20 TABLET, DELAYED RELEASE ORAL at 12:43

## 2020-01-01 RX ADMIN — Medication 3.8 MICROGRAM(S)/KG/MIN: at 08:00

## 2020-01-01 RX ADMIN — Medication 1 APPLICATION(S): at 17:38

## 2020-01-01 RX ADMIN — PIPERACILLIN AND TAZOBACTAM 200 GRAM(S): 4; .5 INJECTION, POWDER, LYOPHILIZED, FOR SOLUTION INTRAVENOUS at 18:14

## 2020-01-01 RX ADMIN — HUMAN INSULIN 12 UNIT(S): 100 INJECTION, SUSPENSION SUBCUTANEOUS at 00:39

## 2020-01-01 RX ADMIN — CEFEPIME 100 MILLIGRAM(S): 1 INJECTION, POWDER, FOR SOLUTION INTRAMUSCULAR; INTRAVENOUS at 09:27

## 2020-01-01 RX ADMIN — SODIUM CHLORIDE 3 MILLILITER(S): 9 INJECTION INTRAMUSCULAR; INTRAVENOUS; SUBCUTANEOUS at 05:35

## 2020-01-01 RX ADMIN — DOPAMINE HYDROCHLORIDE 6.08 MICROGRAM(S)/KG/MIN: 40 INJECTION, SOLUTION, CONCENTRATE INTRAVENOUS at 22:27

## 2020-01-01 RX ADMIN — CHLORHEXIDINE GLUCONATE 15 MILLILITER(S): 213 SOLUTION TOPICAL at 17:49

## 2020-01-01 RX ADMIN — CEFEPIME 100 MILLIGRAM(S): 1 INJECTION, POWDER, FOR SOLUTION INTRAMUSCULAR; INTRAVENOUS at 22:29

## 2020-01-01 RX ADMIN — DOPAMINE HYDROCHLORIDE 6.08 MICROGRAM(S)/KG/MIN: 40 INJECTION, SOLUTION, CONCENTRATE INTRAVENOUS at 05:20

## 2020-01-01 RX ADMIN — PHENYLEPHRINE HYDROCHLORIDE 3.04 MICROGRAM(S)/KG/MIN: 10 INJECTION INTRAVENOUS at 07:29

## 2020-01-01 RX ADMIN — OXYCODONE HYDROCHLORIDE 5 MILLIGRAM(S): 5 TABLET ORAL at 05:05

## 2020-01-01 RX ADMIN — DOPAMINE HYDROCHLORIDE 6.08 MICROGRAM(S)/KG/MIN: 40 INJECTION, SOLUTION, CONCENTRATE INTRAVENOUS at 11:42

## 2020-01-01 RX ADMIN — Medication 3 MILLILITER(S): at 00:21

## 2020-01-01 RX ADMIN — HUMAN INSULIN 16 UNIT(S): 100 INJECTION, SUSPENSION SUBCUTANEOUS at 05:16

## 2020-01-01 RX ADMIN — POLYETHYLENE GLYCOL 3350 17 GRAM(S): 17 POWDER, FOR SOLUTION ORAL at 18:30

## 2020-01-01 RX ADMIN — PANTOPRAZOLE SODIUM 40 MILLIGRAM(S): 20 TABLET, DELAYED RELEASE ORAL at 13:09

## 2020-01-01 RX ADMIN — Medication 1 APPLICATION(S): at 18:33

## 2020-01-01 RX ADMIN — ENOXAPARIN SODIUM 40 MILLIGRAM(S): 100 INJECTION SUBCUTANEOUS at 17:11

## 2020-01-01 RX ADMIN — Medication 100 GRAM(S): at 05:58

## 2020-01-01 RX ADMIN — SODIUM CHLORIDE 3 MILLILITER(S): 9 INJECTION INTRAMUSCULAR; INTRAVENOUS; SUBCUTANEOUS at 00:36

## 2020-01-01 RX ADMIN — HUMAN INSULIN 14 UNIT(S): 100 INJECTION, SUSPENSION SUBCUTANEOUS at 19:52

## 2020-01-01 RX ADMIN — HYDROMORPHONE HYDROCHLORIDE 0.2 MILLIGRAM(S): 2 INJECTION INTRAMUSCULAR; INTRAVENOUS; SUBCUTANEOUS at 12:48

## 2020-01-01 RX ADMIN — SODIUM CHLORIDE 3 MILLILITER(S): 9 INJECTION INTRAMUSCULAR; INTRAVENOUS; SUBCUTANEOUS at 18:12

## 2020-01-01 RX ADMIN — SODIUM CHLORIDE 3 MILLILITER(S): 9 INJECTION INTRAMUSCULAR; INTRAVENOUS; SUBCUTANEOUS at 06:13

## 2020-01-01 RX ADMIN — SODIUM CHLORIDE 3 MILLILITER(S): 9 INJECTION INTRAMUSCULAR; INTRAVENOUS; SUBCUTANEOUS at 23:06

## 2020-01-01 RX ADMIN — HUMAN INSULIN 4 UNIT(S): 100 INJECTION, SUSPENSION SUBCUTANEOUS at 12:04

## 2020-01-01 RX ADMIN — CHLORHEXIDINE GLUCONATE 1 APPLICATION(S): 213 SOLUTION TOPICAL at 22:26

## 2020-01-01 RX ADMIN — Medication 0.5 MILLIGRAM(S): at 23:29

## 2020-01-01 RX ADMIN — MORPHINE SULFATE 0.2 MILLIGRAM(S): 50 CAPSULE, EXTENDED RELEASE ORAL at 07:20

## 2020-01-01 RX ADMIN — FENTANYL CITRATE 12.5 MICROGRAM(S): 50 INJECTION INTRAVENOUS at 01:20

## 2020-01-01 RX ADMIN — Medication 650 MILLIGRAM(S): at 20:00

## 2020-01-01 RX ADMIN — HUMAN INSULIN 10 UNIT(S): 100 INJECTION, SUSPENSION SUBCUTANEOUS at 12:43

## 2020-01-01 RX ADMIN — CEFEPIME 100 MILLIGRAM(S): 1 INJECTION, POWDER, FOR SOLUTION INTRAMUSCULAR; INTRAVENOUS at 06:20

## 2020-01-01 RX ADMIN — CHLORHEXIDINE GLUCONATE 15 MILLILITER(S): 213 SOLUTION TOPICAL at 17:11

## 2020-01-01 RX ADMIN — Medication 650 MILLIGRAM(S): at 19:30

## 2020-01-01 RX ADMIN — SODIUM CHLORIDE 2000 MILLILITER(S): 9 INJECTION, SOLUTION INTRAVENOUS at 07:19

## 2020-01-01 RX ADMIN — HUMAN INSULIN 6 UNIT(S): 100 INJECTION, SUSPENSION SUBCUTANEOUS at 05:29

## 2020-01-01 RX ADMIN — Medication 1 TABLET(S): at 12:23

## 2020-01-01 RX ADMIN — DOPAMINE HYDROCHLORIDE 13.7 MICROGRAM(S)/KG/MIN: 40 INJECTION, SOLUTION, CONCENTRATE INTRAVENOUS at 19:43

## 2020-01-01 RX ADMIN — Medication 4: at 18:18

## 2020-01-01 RX ADMIN — HUMAN INSULIN 14 UNIT(S): 100 INJECTION, SUSPENSION SUBCUTANEOUS at 13:12

## 2020-01-01 RX ADMIN — FENTANYL CITRATE 2.03 MICROGRAM(S)/KG/HR: 50 INJECTION INTRAVENOUS at 12:00

## 2020-01-01 RX ADMIN — FENTANYL CITRATE 2.03 MICROGRAM(S)/KG/HR: 50 INJECTION INTRAVENOUS at 12:50

## 2020-01-01 RX ADMIN — ENOXAPARIN SODIUM 40 MILLIGRAM(S): 100 INJECTION SUBCUTANEOUS at 17:38

## 2020-01-01 RX ADMIN — SODIUM CHLORIDE 40 MILLILITER(S): 5 INJECTION, SOLUTION INTRAVENOUS at 07:57

## 2020-01-01 RX ADMIN — HUMAN INSULIN 10 UNIT(S): 100 INJECTION, SUSPENSION SUBCUTANEOUS at 18:17

## 2020-01-01 RX ADMIN — Medication 2: at 06:00

## 2020-01-01 RX ADMIN — ENOXAPARIN SODIUM 40 MILLIGRAM(S): 100 INJECTION SUBCUTANEOUS at 18:30

## 2020-01-01 RX ADMIN — POTASSIUM PHOSPHATE, MONOBASIC POTASSIUM PHOSPHATE, DIBASIC 62.5 MILLIMOLE(S): 236; 224 INJECTION, SOLUTION INTRAVENOUS at 00:41

## 2020-01-01 RX ADMIN — Medication 4: at 00:21

## 2020-01-01 RX ADMIN — Medication 200 GRAM(S): at 08:13

## 2020-01-01 RX ADMIN — HUMAN INSULIN 16 UNIT(S): 100 INJECTION, SUSPENSION SUBCUTANEOUS at 06:00

## 2020-01-01 RX ADMIN — Medication 1 APPLICATION(S): at 06:02

## 2020-01-01 RX ADMIN — SODIUM CHLORIDE 3 MILLILITER(S): 9 INJECTION INTRAMUSCULAR; INTRAVENOUS; SUBCUTANEOUS at 11:43

## 2020-01-01 RX ADMIN — SODIUM CHLORIDE 3 MILLILITER(S): 9 INJECTION INTRAMUSCULAR; INTRAVENOUS; SUBCUTANEOUS at 17:43

## 2020-01-01 RX ADMIN — Medication 2: at 17:37

## 2020-01-01 RX ADMIN — SODIUM CHLORIDE 3 MILLILITER(S): 9 INJECTION INTRAMUSCULAR; INTRAVENOUS; SUBCUTANEOUS at 05:09

## 2020-01-01 RX ADMIN — SENNA PLUS 2 TABLET(S): 8.6 TABLET ORAL at 21:44

## 2020-01-01 RX ADMIN — HUMAN INSULIN 16 UNIT(S): 100 INJECTION, SUSPENSION SUBCUTANEOUS at 05:45

## 2020-01-01 RX ADMIN — SENNA PLUS 2 TABLET(S): 8.6 TABLET ORAL at 21:02

## 2020-01-01 RX ADMIN — PANTOPRAZOLE SODIUM 40 MILLIGRAM(S): 20 TABLET, DELAYED RELEASE ORAL at 12:42

## 2020-01-01 RX ADMIN — HYDROMORPHONE HYDROCHLORIDE 0.5 MILLIGRAM(S): 2 INJECTION INTRAMUSCULAR; INTRAVENOUS; SUBCUTANEOUS at 12:18

## 2020-01-01 RX ADMIN — SODIUM CHLORIDE 3 MILLILITER(S): 9 INJECTION INTRAMUSCULAR; INTRAVENOUS; SUBCUTANEOUS at 05:13

## 2020-01-01 RX ADMIN — PANTOPRAZOLE SODIUM 40 MILLIGRAM(S): 20 TABLET, DELAYED RELEASE ORAL at 11:41

## 2020-01-01 RX ADMIN — Medication 3 MILLILITER(S): at 17:15

## 2020-01-01 RX ADMIN — Medication 3.8 MICROGRAM(S)/KG/MIN: at 08:07

## 2020-01-01 RX ADMIN — Medication 2: at 06:22

## 2020-01-01 RX ADMIN — Medication 2: at 00:58

## 2020-01-01 RX ADMIN — Medication 2: at 00:28

## 2020-01-01 RX ADMIN — DOPAMINE HYDROCHLORIDE 6.08 MICROGRAM(S)/KG/MIN: 40 INJECTION, SOLUTION, CONCENTRATE INTRAVENOUS at 12:49

## 2020-01-01 RX ADMIN — DEXMEDETOMIDINE HYDROCHLORIDE IN 0.9% SODIUM CHLORIDE 4.05 MICROGRAM(S)/KG/HR: 4 INJECTION INTRAVENOUS at 07:31

## 2020-01-01 RX ADMIN — POLYETHYLENE GLYCOL 3350 17 GRAM(S): 17 POWDER, FOR SOLUTION ORAL at 05:37

## 2020-01-01 RX ADMIN — CEFEPIME 100 MILLIGRAM(S): 1 INJECTION, POWDER, FOR SOLUTION INTRAMUSCULAR; INTRAVENOUS at 21:41

## 2020-01-01 RX ADMIN — SODIUM CHLORIDE 100 MILLILITER(S): 9 INJECTION, SOLUTION INTRAVENOUS at 05:36

## 2020-01-01 RX ADMIN — Medication 1 APPLICATION(S): at 05:14

## 2020-01-01 RX ADMIN — POLYETHYLENE GLYCOL 3350 17 GRAM(S): 17 POWDER, FOR SOLUTION ORAL at 05:14

## 2020-01-01 RX ADMIN — OXYCODONE HYDROCHLORIDE 5 MILLIGRAM(S): 5 TABLET ORAL at 21:32

## 2020-01-01 RX ADMIN — DOPAMINE HYDROCHLORIDE 13.7 MICROGRAM(S)/KG/MIN: 40 INJECTION, SOLUTION, CONCENTRATE INTRAVENOUS at 20:02

## 2020-01-01 RX ADMIN — CHLORHEXIDINE GLUCONATE 1 APPLICATION(S): 213 SOLUTION TOPICAL at 21:59

## 2020-01-01 RX ADMIN — HUMAN INSULIN 3 UNIT(S): 100 INJECTION, SUSPENSION SUBCUTANEOUS at 17:47

## 2020-01-01 RX ADMIN — CHLORHEXIDINE GLUCONATE 15 MILLILITER(S): 213 SOLUTION TOPICAL at 18:33

## 2020-01-01 RX ADMIN — POLYETHYLENE GLYCOL 3350 17 GRAM(S): 17 POWDER, FOR SOLUTION ORAL at 18:32

## 2020-01-01 RX ADMIN — Medication 3 MILLILITER(S): at 13:17

## 2020-01-01 RX ADMIN — DEXMEDETOMIDINE HYDROCHLORIDE IN 0.9% SODIUM CHLORIDE 4.05 MICROGRAM(S)/KG/HR: 4 INJECTION INTRAVENOUS at 08:00

## 2020-01-01 RX ADMIN — DOPAMINE HYDROCHLORIDE 13.7 MICROGRAM(S)/KG/MIN: 40 INJECTION, SOLUTION, CONCENTRATE INTRAVENOUS at 07:36

## 2020-01-01 RX ADMIN — PIPERACILLIN AND TAZOBACTAM 25 GRAM(S): 4; .5 INJECTION, POWDER, LYOPHILIZED, FOR SOLUTION INTRAVENOUS at 22:59

## 2020-01-01 RX ADMIN — HUMAN INSULIN 16 UNIT(S): 100 INJECTION, SUSPENSION SUBCUTANEOUS at 00:59

## 2020-01-01 RX ADMIN — Medication 2: at 11:10

## 2020-01-01 RX ADMIN — SODIUM CHLORIDE 40 MILLILITER(S): 9 INJECTION, SOLUTION INTRAVENOUS at 07:32

## 2020-01-01 RX ADMIN — Medication 1 APPLICATION(S): at 06:08

## 2020-01-01 RX ADMIN — CHLORHEXIDINE GLUCONATE 15 MILLILITER(S): 213 SOLUTION TOPICAL at 18:06

## 2020-01-01 RX ADMIN — CHLORHEXIDINE GLUCONATE 1 APPLICATION(S): 213 SOLUTION TOPICAL at 05:10

## 2020-01-01 RX ADMIN — Medication 2: at 05:45

## 2020-01-01 RX ADMIN — PANTOPRAZOLE SODIUM 40 MILLIGRAM(S): 20 TABLET, DELAYED RELEASE ORAL at 12:03

## 2020-01-01 RX ADMIN — CHLORHEXIDINE GLUCONATE 15 MILLILITER(S): 213 SOLUTION TOPICAL at 05:12

## 2020-01-01 RX ADMIN — Medication 200 GRAM(S): at 09:30

## 2020-01-01 RX ADMIN — MORPHINE SULFATE 0.2 MILLIGRAM(S): 50 CAPSULE, EXTENDED RELEASE ORAL at 07:45

## 2020-01-01 RX ADMIN — Medication 3 MILLILITER(S): at 17:13

## 2020-01-01 RX ADMIN — SODIUM CHLORIDE 40 MILLILITER(S): 5 INJECTION, SOLUTION INTRAVENOUS at 18:12

## 2020-01-01 RX ADMIN — Medication 3 MILLILITER(S): at 06:12

## 2020-01-01 RX ADMIN — Medication 1 APPLICATION(S): at 05:11

## 2020-01-01 RX ADMIN — PANTOPRAZOLE SODIUM 40 MILLIGRAM(S): 20 TABLET, DELAYED RELEASE ORAL at 12:22

## 2020-01-01 RX ADMIN — FENTANYL CITRATE 6.1 MICROGRAM(S)/KG/HR: 50 INJECTION INTRAVENOUS at 14:00

## 2020-01-01 RX ADMIN — Medication 2: at 12:04

## 2020-01-01 RX ADMIN — SENNA PLUS 2 TABLET(S): 8.6 TABLET ORAL at 21:05

## 2020-01-01 RX ADMIN — Medication 650 MILLIGRAM(S): at 03:40

## 2020-01-01 RX ADMIN — FENTANYL CITRATE 12.5 MICROGRAM(S): 50 INJECTION INTRAVENOUS at 12:07

## 2020-01-01 RX ADMIN — Medication 650 MILLIGRAM(S): at 18:50

## 2020-01-01 RX ADMIN — FENTANYL CITRATE 12.5 MICROGRAM(S): 50 INJECTION INTRAVENOUS at 01:35

## 2020-01-01 RX ADMIN — SODIUM CHLORIDE 3 MILLILITER(S): 9 INJECTION INTRAMUSCULAR; INTRAVENOUS; SUBCUTANEOUS at 17:34

## 2020-01-01 RX ADMIN — Medication 325 MILLIGRAM(S): at 12:42

## 2020-01-01 RX ADMIN — ENOXAPARIN SODIUM 40 MILLIGRAM(S): 100 INJECTION SUBCUTANEOUS at 18:32

## 2020-01-01 RX ADMIN — POLYETHYLENE GLYCOL 3350 17 GRAM(S): 17 POWDER, FOR SOLUTION ORAL at 18:17

## 2020-01-01 RX ADMIN — FENTANYL CITRATE 50 MICROGRAM(S): 50 INJECTION INTRAVENOUS at 15:10

## 2020-01-01 RX ADMIN — CHLORHEXIDINE GLUCONATE 15 MILLILITER(S): 213 SOLUTION TOPICAL at 05:30

## 2020-01-01 RX ADMIN — Medication 3 MILLILITER(S): at 18:10

## 2020-01-01 RX ADMIN — Medication 3.8 MICROGRAM(S)/KG/MIN: at 07:30

## 2020-01-01 RX ADMIN — SODIUM CHLORIDE 3 MILLILITER(S): 9 INJECTION INTRAMUSCULAR; INTRAVENOUS; SUBCUTANEOUS at 11:03

## 2020-01-01 RX ADMIN — FENTANYL CITRATE 2.03 MICROGRAM(S)/KG/HR: 50 INJECTION INTRAVENOUS at 11:42

## 2020-01-01 RX ADMIN — Medication 0.5 MILLIGRAM(S): at 00:44

## 2020-01-01 RX ADMIN — PIPERACILLIN AND TAZOBACTAM 25 GRAM(S): 4; .5 INJECTION, POWDER, LYOPHILIZED, FOR SOLUTION INTRAVENOUS at 06:02

## 2020-01-01 RX ADMIN — CHLORHEXIDINE GLUCONATE 1 APPLICATION(S): 213 SOLUTION TOPICAL at 21:03

## 2020-01-01 RX ADMIN — Medication 3 MILLILITER(S): at 12:42

## 2020-01-01 RX ADMIN — Medication 1 APPLICATION(S): at 17:50

## 2020-01-01 RX ADMIN — Medication 10 MILLIGRAM(S): at 19:24

## 2020-01-01 RX ADMIN — Medication 30 MILLIEQUIVALENT(S): at 00:54

## 2020-01-01 RX ADMIN — Medication 3 MILLILITER(S): at 17:54

## 2020-01-01 RX ADMIN — CHLORHEXIDINE GLUCONATE 1 APPLICATION(S): 213 SOLUTION TOPICAL at 21:05

## 2020-01-01 RX ADMIN — Medication 1 APPLICATION(S): at 17:11

## 2020-01-01 RX ADMIN — SODIUM CHLORIDE 3 MILLILITER(S): 9 INJECTION INTRAMUSCULAR; INTRAVENOUS; SUBCUTANEOUS at 12:09

## 2020-01-01 RX ADMIN — SODIUM CHLORIDE 3 MILLILITER(S): 9 INJECTION INTRAMUSCULAR; INTRAVENOUS; SUBCUTANEOUS at 17:31

## 2020-01-01 RX ADMIN — CHLORHEXIDINE GLUCONATE 1 APPLICATION(S): 213 SOLUTION TOPICAL at 21:44

## 2020-01-01 RX ADMIN — SODIUM CHLORIDE 3 MILLILITER(S): 9 INJECTION INTRAMUSCULAR; INTRAVENOUS; SUBCUTANEOUS at 23:10

## 2020-01-01 RX ADMIN — SODIUM CHLORIDE 2000 MILLILITER(S): 9 INJECTION INTRAMUSCULAR; INTRAVENOUS; SUBCUTANEOUS at 19:24

## 2020-01-01 RX ADMIN — CEFEPIME 100 MILLIGRAM(S): 1 INJECTION, POWDER, FOR SOLUTION INTRAMUSCULAR; INTRAVENOUS at 05:16

## 2020-01-01 RX ADMIN — FENTANYL CITRATE 6.1 MICROGRAM(S)/KG/HR: 50 INJECTION INTRAVENOUS at 19:38

## 2020-01-01 RX ADMIN — SODIUM CHLORIDE 3 MILLILITER(S): 9 INJECTION INTRAMUSCULAR; INTRAVENOUS; SUBCUTANEOUS at 00:17

## 2020-01-01 RX ADMIN — HUMAN INSULIN 12 UNIT(S): 100 INJECTION, SUSPENSION SUBCUTANEOUS at 13:10

## 2020-01-01 RX ADMIN — HUMAN INSULIN 16 UNIT(S): 100 INJECTION, SUSPENSION SUBCUTANEOUS at 11:41

## 2020-01-01 RX ADMIN — Medication 4: at 01:12

## 2020-01-01 RX ADMIN — SODIUM CHLORIDE 3 MILLILITER(S): 9 INJECTION INTRAMUSCULAR; INTRAVENOUS; SUBCUTANEOUS at 05:21

## 2020-01-01 RX ADMIN — FENTANYL CITRATE 2.03 MICROGRAM(S)/KG/HR: 50 INJECTION INTRAVENOUS at 05:20

## 2020-01-01 RX ADMIN — PIPERACILLIN AND TAZOBACTAM 25 GRAM(S): 4; .5 INJECTION, POWDER, LYOPHILIZED, FOR SOLUTION INTRAVENOUS at 05:14

## 2020-01-01 RX ADMIN — CHLORHEXIDINE GLUCONATE 1 APPLICATION(S): 213 SOLUTION TOPICAL at 05:21

## 2020-01-01 RX ADMIN — SODIUM CHLORIDE 40 MILLILITER(S): 9 INJECTION, SOLUTION INTRAVENOUS at 00:38

## 2020-01-01 RX ADMIN — INSULIN HUMAN 2 UNIT(S)/HR: 100 INJECTION, SOLUTION SUBCUTANEOUS at 05:36

## 2020-01-01 RX ADMIN — CHLORHEXIDINE GLUCONATE 15 MILLILITER(S): 213 SOLUTION TOPICAL at 06:02

## 2020-01-01 RX ADMIN — ENOXAPARIN SODIUM 40 MILLIGRAM(S): 100 INJECTION SUBCUTANEOUS at 17:37

## 2020-01-01 RX ADMIN — Medication 1 TABLET(S): at 12:30

## 2020-01-01 RX ADMIN — FENTANYL CITRATE 50 MICROGRAM(S): 50 INJECTION INTRAVENOUS at 15:40

## 2020-01-01 RX ADMIN — Medication 40 MILLIEQUIVALENT(S): at 05:36

## 2020-01-01 RX ADMIN — HUMAN INSULIN 16 UNIT(S): 100 INJECTION, SUSPENSION SUBCUTANEOUS at 12:52

## 2020-01-01 RX ADMIN — HUMAN INSULIN 4 UNIT(S): 100 INJECTION, SUSPENSION SUBCUTANEOUS at 17:38

## 2020-01-01 RX ADMIN — SODIUM CHLORIDE 3 MILLILITER(S): 9 INJECTION INTRAMUSCULAR; INTRAVENOUS; SUBCUTANEOUS at 11:46

## 2020-01-01 RX ADMIN — SODIUM CHLORIDE 3 MILLILITER(S): 9 INJECTION INTRAMUSCULAR; INTRAVENOUS; SUBCUTANEOUS at 12:42

## 2020-01-01 RX ADMIN — OXYCODONE HYDROCHLORIDE 5 MILLIGRAM(S): 5 TABLET ORAL at 04:35

## 2020-01-01 RX ADMIN — DEXMEDETOMIDINE HYDROCHLORIDE IN 0.9% SODIUM CHLORIDE 4.05 MICROGRAM(S)/KG/HR: 4 INJECTION INTRAVENOUS at 19:01

## 2020-01-01 RX ADMIN — Medication 1 APPLICATION(S): at 05:15

## 2020-01-01 RX ADMIN — Medication 3.8 MICROGRAM(S)/KG/MIN: at 07:57

## 2020-01-01 RX ADMIN — CEFEPIME 100 MILLIGRAM(S): 1 INJECTION, POWDER, FOR SOLUTION INTRAMUSCULAR; INTRAVENOUS at 13:38

## 2020-01-01 RX ADMIN — HUMAN INSULIN 12 UNIT(S): 100 INJECTION, SUSPENSION SUBCUTANEOUS at 05:57

## 2020-01-01 RX ADMIN — Medication 1 APPLICATION(S): at 05:29

## 2020-01-01 RX ADMIN — PHENYLEPHRINE HYDROCHLORIDE 3.04 MICROGRAM(S)/KG/MIN: 10 INJECTION INTRAVENOUS at 03:26

## 2020-01-01 RX ADMIN — Medication 3 MILLILITER(S): at 23:49

## 2020-01-01 RX ADMIN — FENTANYL CITRATE 12.5 MICROGRAM(S): 50 INJECTION INTRAVENOUS at 22:05

## 2020-01-01 RX ADMIN — DEXMEDETOMIDINE HYDROCHLORIDE IN 0.9% SODIUM CHLORIDE 4.05 MICROGRAM(S)/KG/HR: 4 INJECTION INTRAVENOUS at 08:09

## 2020-01-01 RX ADMIN — Medication 500 MILLIGRAM(S): at 14:27

## 2020-01-01 RX ADMIN — SENNA PLUS 2 TABLET(S): 8.6 TABLET ORAL at 22:55

## 2020-01-01 RX ADMIN — CEFEPIME 100 MILLIGRAM(S): 1 INJECTION, POWDER, FOR SOLUTION INTRAMUSCULAR; INTRAVENOUS at 05:31

## 2020-01-01 RX ADMIN — SODIUM CHLORIDE 3 MILLILITER(S): 9 INJECTION INTRAMUSCULAR; INTRAVENOUS; SUBCUTANEOUS at 00:10

## 2020-01-01 RX ADMIN — SENNA PLUS 2 TABLET(S): 8.6 TABLET ORAL at 21:42

## 2020-01-01 RX ADMIN — Medication 4: at 12:43

## 2020-01-01 RX ADMIN — DEXMEDETOMIDINE HYDROCHLORIDE IN 0.9% SODIUM CHLORIDE 4.05 MICROGRAM(S)/KG/HR: 4 INJECTION INTRAVENOUS at 07:58

## 2020-01-01 RX ADMIN — Medication 62.5 MILLIMOLE(S): at 00:42

## 2020-01-01 RX ADMIN — Medication 1 TABLET(S): at 12:42

## 2020-01-01 RX ADMIN — HUMAN INSULIN 16 UNIT(S): 100 INJECTION, SUSPENSION SUBCUTANEOUS at 11:10

## 2020-01-01 RX ADMIN — Medication 3.8 MICROGRAM(S)/KG/MIN: at 05:36

## 2020-10-14 NOTE — CHART NOTE - NSCHARTNOTEFT_GEN_A_CORE
CAPRINI SCORE [CLOT] Score on Admission for     AGE RELATED RISK FACTORS                                                       MOBILITY RELATED FACTORS  [X] Age 41-60 years                                            (1 Point)                  [ ] Bed rest                                                        (1 Point)  [ ] Age: 61-74 years                                           (2 Points)                [ ] Plaster cast                                                   (2 Points)  [ ] Age= 75 years                                              (3 Points)                  [ ] Bed bound for more than 72 hours         (2 Points)    DISEASE RELATED RISK FACTORS                                               GENDER SPECIFIC FACTORS  [ ] Edema in the lower extremities                       (1 Point)           [ ] Pregnancy                                                          (1 Point)  [ ] Varicose veins                                               (1 Point)                  [ ] Post-partum < 6 weeks                                   (1 Point)             [ ] BMI > 25 Kg/m2                                            (1 Point)                  [ ] Hormonal therapy  or oral contraception   (1 Point)                 [ ] Sepsis (in the previous month)                        (1 Point)            [ ] History of pregnancy complications             (1 point)  [ ] Pneumonia or serious lung disease                                            [ ] Unexplained or recurrent               (1 Point)           (in the previous month)                               (1 Point)  [ ] Abnormal pulmonary function test                     (1 Point)                 SURGERY RELATED RISK FACTORS (include planned surgeries)  [ ] Acute myocardial infarction                              (1 Point)                 [ ]  Section                                             (1 Point)  [ ] Congestive heart failure (in the previous month)  (1 Point)        [ ] Minor surgery                                                  (1 Point)   [ ] Inflammatory bowel disease                             (1 Point)                 [ ] Arthroscopic surgery                                        (2 Points)  [ ] Central venous access                                      (2 Points)  [ ] History / presence of malignancy                   (2 points)   [X] General surgery lasting more than 45 minutes   (2 Points)       [ ] Stroke (in the previous month)                          (5 Points)               [ ] Elective arthroplasty                                         (5 Points)                                                                                                                                               HEMATOLOGY RELATED FACTORS                                                 TRAUMA RELATED RISK FACTORS  [ ] Prior episodes of VTE                                     (3 Points)                [ ] Fracture of the hip, pelvis, or leg                       (5 Points)  [ ] Positive family history for VTE                         (3 Points)             [X] Acute spinal cord injury (in the previous month)  (5 Points)  [ ] Prothrombin 18977 A                                     (3 Points)                [ ] Paralysis  (less than 1 month)                             (5 Points)  [ ] Factor V Leiden                                             (3 Points)                   [ ] Multiple Trauma within 1 month                        (5 Points)  [ ] Lupus anticoagulants                                     (3 Points)                                                           [ ] Anticardiolipin antibodies                               (3 Points)                                                       [ ] High homocysteine in the blood                      (3 Points)                                             [ ] Other congenital or acquired thrombophilia      (3 Points)                                                [ ] Heparin induced thrombocytopenia                  (3 Points)                                          Total Score [     8     ]    Risk:  Very low 0   Low 1 to 2   Moderate 3 to 4   High =5       VTE Prophylaxis Recommendations:  [X] mechanical pneumatic compression devices                                      [ ] contraindicated: _____________________  [ ] chemoprophylaxis                                                                                    [X] contraindicated ____going to OR_________________    **** HIGH LIKELIHOOD DVT PRESENT ON ADMISSION  [X] (please order LE dopplers within 24 hours of admission)

## 2020-10-14 NOTE — CHART NOTE - NSCHARTNOTEFT_GEN_A_CORE
EMERGENCY : LMSW responded to level 1 trauma activation in ED as LMSW was notified that patient was accompanied by neighbor. As per medical team patient fell off of a ladder. LMSW met with patient's neighbor in the waiting room and introduced self and role to which he verbalized understanding. Patient's neighbor found patient on the ground outside of his house and activated 911 and does not know patients full name, just by the name of Mr. Andre. Patient's neighbor had contact information for patient's son Baldomero Andre (PH: [phone]) and patient's wife [phone]. Patient's neighbor states patient's son and wife are currently out of state and are aware patient was being brought to the hospital. LMSW contacted patient's son and introduced self and role to which he verbalized understanding. Patient's son states he is aware patient is in the hospital. He identifies himself and his mother as patient's primary contacts. LMSW provided MD with patient's sons contact information. LMSW also connected patient's son with ED Rep to complete registration and collect information on patient. Patient's son states that patient's name is Wendy Andre : 51. LMSW also provided patient's son with contact information for hospital as well as for LMSW. Social work to continue to follow and remain available for any needs or concerns.

## 2020-10-14 NOTE — ED PROVIDER NOTE - CPE EDP RESP NORM
normal...
Patient declined information/Patient refused referral at admission and discharge for smoking cessation

## 2020-10-14 NOTE — PRE-ANESTHESIA EVALUATION ADULT - NSANTHOSAYNRD_GEN_A_CORE
No. HEATH screening performed.  STOP BANG Legend: 0-2 = LOW Risk; 3-4 = INTERMEDIATE Risk; 5-8 = HIGH Risk

## 2020-10-14 NOTE — ED ADULT TRIAGE NOTE - LOCATION:
Tamera Coffey called to say pt heart rate is up to 118 today. It was also up on fri.  Please advise what pt should do
Left arm;

## 2020-10-14 NOTE — PRE-ANESTHESIA EVALUATION ADULT - NSANTHPMHFT_GEN_ALL_CORE
4 year old male was found down by neighbors (unknown duration) and brought in by EMS, reportedly was on a ladder and fell 8 feet.   unable to move extremities but able to move facial muscles  Exposure obtained     CT L Head CT: No evidence for intracranial hemorrhage, mass effect, or displaced calvarial fracture. Laceration of the right frontal calvarium without associated calvarial fracture.    Maxillofacial CT: No acute facial bone fracture.    Cervical spine CT: No evidence for acute displaced fracture or traumatic malalignment.  Prominent prevertebral soft tissues. Ligamentous injury is not excluded. Follow-up cervical spine MRI is recommended for further evaluation.(already in progress)    Thoracolumbar spine: No evidence for acute displaced fracture or traumatic malalignment. 54 year old male was found down by neighbors (unknown duration) and brought in by EMS, reportedly was on a ladder and fell 8 feet.   unable to move extremities but able to move facial muscles; h/o HTN and diabetes noninsulin as per pt; no past surgical history       CT L Head CT: No evidence for intracranial hemorrhage, mass effect, or displaced calvarial fracture. Laceration of the right frontal calvarium without associated calvarial fracture.    Maxillofacial CT: No acute facial bone fracture.    Cervical spine CT: No evidence for acute displaced fracture or traumatic malalignment.  Prominent prevertebral soft tissues. Ligamentous injury is not excluded. Follow-up cervical spine MRI is recommended for further evaluation.(already in progress)    Thoracolumbar spine: No evidence for acute displaced fracture or traumatic malalignment.

## 2020-10-14 NOTE — ED PROVIDER NOTE - CLINICAL SUMMARY MEDICAL DECISION MAKING FREE TEXT BOX
Pt presenting via EMS after fall ~8-10 feet off a ladder, chief complaint of no feeling or movement in b/l upper extremities and lower extremities. Initial activation as level II prior to arrival and upgrade to level I after concern for spinal cord injury appreciated. Plan for pan CT scan with spinal reformats, neurosurgery/spine at bedside and recommended MR to follow CT to eval for cord injury. The patient was ushered to CT by trauma team. Disposition to SICU per trauma attending. the patient will be escorted to MRI at discretion of trauma team and spine team. The patient experienced relative hypotension (SBPs in 90s systolic), responded to IV fluid challenge. Phenylephrine at bedside should BP continue to drop out of concern for evolving spinal shock.

## 2020-10-14 NOTE — ED ADULT NURSE NOTE - OBJECTIVE STATEMENT
Level 1 Trauma Activation at 1837. Approx 53 yo M pt found down outside home after fall of approx 8-10 ft from ladder. Pt reports unknown down time. Negative sensation/movement x4. Negative rectal tone. Laceration to frontal scalp bleeding controlled. Trauma team at bedside for evaluation.  See trauma flow sheet for pt assessment.

## 2020-10-14 NOTE — ED ADULT NURSE REASSESSMENT NOTE - NS ED NURSE REASSESS COMMENT FT1
pt received from DANGELO Ferguson while in CT with trauma team. escorted to MRI and per nsx team found to have c3-c5 cord contusion. while in MRI pt BP dropped to 90s/60s- per nsx keep MAP >85 so levophed started. report given to OR RN Sheela and brought to OR emergently with RNs and MDs.

## 2020-10-14 NOTE — CONSULT NOTE ADULT - SUBJECTIVE AND OBJECTIVE BOX
TRAUMA SERVICE (Acute Care Surgery / [facility] - #[id number]) - CONSULT NOTE  --------------------------------------------------------------------------------------------    TRAUMA ACTIVATION LEVEL:     MECHANISM OF INJURY:      [] Blunt  	[] MVC	[] Fall	[] Pedestrian Struck	[] Motorcycle accident      [] Penetrating  	[] Gun Shot Wound 		[] Stab Wound    GCS: 	E: 4	V: 5	M: 6       HPI:   54 year old male was found down by neighbors (unknown duration) and brought in by EMS, reportedly was on a ladder and fell 8 feet.        Primary Survey:   A - airway intact  B - bilateral breath sounds and good chest rise  C - initial BP: 90s, HR: 80s , palpable pulses in all extremities  D - GCS 15 on arrival - unable to move extremities but able to move facial muscles  Exposure obtained      Secondary Survey:  General: NAD  HEENT: Normocephalic, EOMI, PEERLA, 4cm laceration in the frontal scalp   Neck: Soft, midline trachea, C-collar in place  Chest: No chest wall tenderness. Right Clavical tenderness  Cardiac: S1, S2, RRR  Respiratory: Bilateral breath sounds, clear and equal bilaterally  Abdomen: Soft, non-distended, non-tender, no rebound, no guarding, no masses palpated  Pelvis: Stable, non-tender, no ecchymosis  Ext: palp radial b/l UE, b/l DP palp in Lower Extrem, no motor or sensation in all 4 extremities, b/l shoulder tenderness.   Back: no TTP, no palpable runoff/stepoff/deformity  Rectal: No alma blood, BRANDON with good tone    Patient denies fevers/chills, denies lightheadedness/dizziness, denies SOB/chest pain, denies nausea/vomiting, denies constipation/diarrhea.  ***    ROS: 10-system review is otherwise negative except HPI above.      PAST MEDICAL & SURGICAL HISTORY:  No pertinent past medical history    No significant past surgical history      FAMILY HISTORY:    [] Family history not pertinent as reviewed with the patient and family    SOCIAL HISTORY:  unable to obtain    ALLERGIES: No Known Allergies    HOME MEDICATIONS: unable to obtain    CURRENT MEDICATIONS  MEDICATIONS (STANDING): dexAMETHasone  Injectable 10 milliGRAM(s) IV Push Once  diphtheria/tetanus/pertussis (acellular) Vaccine (ADAcel) 0.5 milliLiter(s) IntraMuscular once  sodium chloride 0.9% Bolus 1000 milliLiter(s) IV Bolus once    MEDICATIONS (PRN):  --------------------------------------------------------------------------------------------    Vitals:   T(C): --  HR: --  BP: --  RR: --  SpO2: --  CAPILLARY BLOOD GLUCOSE        CAPILLARY BLOOD GLUCOSE  --------------------------------------------------------------------------------------------    LABS    Pending        --------------------------------------------------------------------------------------------    MICROBIOLOGY      --------------------------------------------------------------------------------------------    IMAGING  Pending    --------------------------------------------------------------------------------------------   TRAUMA SERVICE (Acute Care Surgery / [facility] - #9058) - CONSULT NOTE  --------------------------------------------------------------------------------------------    TRAUMA ACTIVATION LEVEL:     MECHANISM OF INJURY:      [] Blunt  	[] MVC	[] Fall	[] Pedestrian Struck	[] Motorcycle accident      [] Penetrating  	[] Gun Shot Wound 		[] Stab Wound    GCS: 	E: 4	V: 5	M: 6       HPI:   54 year old male was found down by neighbors (unknown duration) and brought in by EMS, reportedly was on a ladder and fell 8 feet.        Primary Survey:   A - airway intact  B - bilateral breath sounds and good chest rise  C - initial BP: 90s, HR: 80s , palpable pulses in all extremities  D - GCS 15 on arrival - unable to move extremities but able to move facial muscles  Exposure obtained      Secondary Survey:  General: NAD  HEENT: Normocephalic, EOMI, PEERLA, 4cm laceration in the frontal scalp   Neck: Soft, midline trachea, C-collar in place  Chest: No chest wall tenderness. Right Clavical tenderness  Cardiac: S1, S2, RRR  Respiratory: Bilateral breath sounds, clear and equal bilaterally  Abdomen: Soft, non-distended, non-tender, no rebound, no guarding, no masses palpated  Pelvis: Stable, non-tender, no ecchymosis  Ext: palp radial b/l UE, b/l DP palp in Lower Extrem, no motor or sensation in all 4 extremities, b/l shoulder tenderness.   Back: no TTP, no palpable runoff/stepoff/deformity  Rectal: No alma blood, BRANDON with good tone    Patient denies fevers/chills, denies lightheadedness/dizziness, denies SOB/chest pain, denies nausea/vomiting, denies constipation/diarrhea.  ***    ROS: 10-system review is otherwise negative except HPI above.      PAST MEDICAL & SURGICAL HISTORY:  No pertinent past medical history    No significant past surgical history      FAMILY HISTORY:    [] Family history not pertinent as reviewed with the patient and family    SOCIAL HISTORY:  unable to obtain    ALLERGIES: No Known Allergies    HOME MEDICATIONS: unable to obtain    CURRENT MEDICATIONS  MEDICATIONS (STANDING): dexAMETHasone  Injectable 10 milliGRAM(s) IV Push Once  diphtheria/tetanus/pertussis (acellular) Vaccine (ADAcel) 0.5 milliLiter(s) IntraMuscular once  sodium chloride 0.9% Bolus 1000 milliLiter(s) IV Bolus once    MEDICATIONS (PRN):  --------------------------------------------------------------------------------------------    Vitals:   T(C): --  HR: --  BP: --  RR: --  SpO2: --  CAPILLARY BLOOD GLUCOSE        CAPILLARY BLOOD GLUCOSE  --------------------------------------------------------------------------------------------    LABS                          10.4   7.28  )-----------( 324      ( 14 Oct 2020 18:53 )             32.5   10-14    137  |  103  |  13  ----------------------------<  122<H>  3.5   |  19<L>  |  1.07    Ca    9.4      14 Oct 2020 18:53    TPro  7.1  /  Alb  4.3  /  TBili  0.5  /  DBili  x   /  AST  25  /  ALT  20  /  AlkPhos  56  10-14  PT/INR - ( 14 Oct 2020 18:53 )   PT: 12.1 sec;   INR: 1.01 ratio         PTT - ( 14 Oct 2020 18:53 )  PTT:24.5 sec        --------------------------------------------------------------------------------------------    MICROBIOLOGY      --------------------------------------------------------------------------------------------    IMAGING  < from: CT Thoracic Spine Reform No Cont (10.14.20 @ 20:04) >  Head CT: No evidence for intracranial hemorrhage, mass effect, or displaced calvarial fracture. Laceration of the right frontal calvarium without associated calvarial fracture.    Maxillofacial CT: No acute facial bone fracture.    Cervical spine CT: No evidence for acute displaced fracture or traumatic malalignment.  Prominent prevertebral soft tissues. Ligamentous injury is not excluded. Follow-up cervical spine MRI is recommended for further evaluation.(already in progress)    Thoracolumbar spine: No evidence for acute displaced fractureor traumatic malalignment.        < end of copied text >  < from: CT Abdomen and Pelvis w/ IV Cont (10.14.20 @ 19:22) >  No traumatic sequela.      < end of copied text >    < from: MR Thoracic Spine No Cont (10.14.20 @ 20:25) >  Impression:    Cervical spine MRI: C3-4 central disc herniation results in compression of the spinal cord with associated spinal cord edema from C3 through C4. Extensive prevertebral soft tissue swelling at C1 through C5 and at C7-T3 with probable disruption of the anterior longitudinal ligament. Interspinous ligamentous injury at C2-C6.    Thoracic spine MRI: No evidence for spinal cord compression.    The results of this examination were discussed with Dr. Ackerman by Dr. Abdalla at 8:35 PM on 10/14/2020      < end of copied text >    --------------------------------------------------------------------------------------------   TRAUMA SERVICE (Acute Care Surgery / [facility] - #9087) - CONSULT NOTE  --------------------------------------------------------------------------------------------    TRAUMA ACTIVATION LEVEL: 1    MECHANISM OF INJURY:      [x] Blunt  	[] MVC	[x] Fall	[] Pedestrian Struck	[] Motorcycle accident      [] Penetrating  	[] Gun Shot Wound 		[] Stab Wound    GCS: 	E: 4	V: 5	M: 6       HPI:   54 year old male was found down by neighbors (unknown duration) and brought in by EMS, reportedly was on a ladder and fell 8 feet.        Primary Survey:   A - airway intact  B - bilateral breath sounds and good chest rise  C - initial BP: 90s, HR: 80s , palpable pulses in all extremities  D - GCS 15 on arrival - unable to move extremities but able to move facial muscles  Exposure obtained      Secondary Survey:  General: NAD  HEENT: Normocephalic, EOMI, PEERLA, 4cm laceration in the frontal scalp   Neck: Soft, midline trachea, C-collar in place  Chest: No chest wall tenderness. Right Clavical tenderness  Cardiac: S1, S2, RRR  Respiratory: Bilateral breath sounds, clear and equal bilaterally  Abdomen: Soft, non-distended, non-tender, no rebound, no guarding, no masses palpated  Pelvis: Stable, non-tender, no ecchymosis  Ext: No motor or sensation in all 4 extremities, b/l shoulder tenderness.   Back: no TTP, no palpable runoff/stepoff/deformity  Rectal: No alma blood, BRANDON without rectal tone    Patient denies fevers/chills, denies lightheadedness/dizziness, denies SOB/chest pain, denies nausea/vomiting, denies constipation/diarrhea.  ***    ROS: 10-system review is otherwise negative except HPI above.      PAST MEDICAL & SURGICAL HISTORY:  No pertinent past medical history    No significant past surgical history      FAMILY HISTORY:    [] Family history not pertinent as reviewed with the patient and family    SOCIAL HISTORY:  unable to obtain    ALLERGIES: No Known Allergies    HOME MEDICATIONS: unable to obtain    CURRENT MEDICATIONS  MEDICATIONS (STANDING): dexAMETHasone  Injectable 10 milliGRAM(s) IV Push Once  diphtheria/tetanus/pertussis (acellular) Vaccine (ADAcel) 0.5 milliLiter(s) IntraMuscular once  sodium chloride 0.9% Bolus 1000 milliLiter(s) IV Bolus once    MEDICATIONS (PRN):  --------------------------------------------------------------------------------------------    Vitals:   T(C): --  HR: --  BP: --  RR: --  SpO2: --  CAPILLARY BLOOD GLUCOSE        CAPILLARY BLOOD GLUCOSE  --------------------------------------------------------------------------------------------    LABS                          10.4   7.28  )-----------( 324      ( 14 Oct 2020 18:53 )             32.5   10-14    137  |  103  |  13  ----------------------------<  122<H>  3.5   |  19<L>  |  1.07    Ca    9.4      14 Oct 2020 18:53    TPro  7.1  /  Alb  4.3  /  TBili  0.5  /  DBili  x   /  AST  25  /  ALT  20  /  AlkPhos  56  10-14  PT/INR - ( 14 Oct 2020 18:53 )   PT: 12.1 sec;   INR: 1.01 ratio         PTT - ( 14 Oct 2020 18:53 )  PTT:24.5 sec        --------------------------------------------------------------------------------------------    MICROBIOLOGY      --------------------------------------------------------------------------------------------    IMAGING  < from: CT Thoracic Spine Reform No Cont (10.14.20 @ 20:04) >  Head CT: No evidence for intracranial hemorrhage, mass effect, or displaced calvarial fracture. Laceration of the right frontal calvarium without associated calvarial fracture.    Maxillofacial CT: No acute facial bone fracture.    Cervical spine CT: No evidence for acute displaced fracture or traumatic malalignment.  Prominent prevertebral soft tissues. Ligamentous injury is not excluded. Follow-up cervical spine MRI is recommended for further evaluation.(already in progress)    Thoracolumbar spine: No evidence for acute displaced fractureor traumatic malalignment.        < end of copied text >  < from: CT Abdomen and Pelvis w/ IV Cont (10.14.20 @ 19:22) >  No traumatic sequela.      < end of copied text >    < from: MR Thoracic Spine No Cont (10.14.20 @ 20:25) >  Impression:    Cervical spine MRI: C3-4 central disc herniation results in compression of the spinal cord with associated spinal cord edema from C3 through C4. Extensive prevertebral soft tissue swelling at C1 through C5 and at C7-T3 with probable disruption of the anterior longitudinal ligament. Interspinous ligamentous injury at C2-C6.    Thoracic spine MRI: No evidence for spinal cord compression.    The results of this examination were discussed with Dr. Ackerman by Dr. Abdalla at 8:35 PM on 10/14/2020      < end of copied text >    --------------------------------------------------------------------------------------------

## 2020-10-14 NOTE — ED PROVIDER NOTE - ATTENDING CONTRIBUTION TO CARE
Attending MD Llanos:  I personally have seen and examined this patient.  Resident note reviewed and agree on plan of care and except where noted.  See HPI, PE, and MDM for details.

## 2020-10-14 NOTE — ED PROVIDER NOTE - OBJECTIVE STATEMENT
55yo M pmhx DM BIBEMS s/p fall from ladder >10 ft, was laying on floor about 1 hr, unable to move upper or lower extremities. Awake, protecting airway, answers questions appropriately. Denies anticoagulant use.

## 2020-10-14 NOTE — CONSULT NOTE ADULT - ASSESSMENT
54 year old male was found down by neighbors (unknown duration) and brought in by EMS, reportedly was on a ladder and fell 8 feet.    PLAN:  To follow  -   -   -   -   - Patient seen/examined with attending.  - Plan to be discussed with Attending,  54 year old male was found down by neighbors (unknown duration) and brought in by EMS, reportedly was on a ladder and fell 8 feet found to be paraplegic with C3-4 disc herniation and compression of spinal cord with cord edema and prevertebral soft tissue swelling at CT 1 -5.     PLAN:    - Patient currently in the OR with Neurosurgery will follow up progress  - No acute surgical intervention from Trauma surgery  - No bony injury identified on CT Head/C-spine/C/A/P  - Tertiary exam within 24 hours.   - Patient seen/examined with Attending Dr. April Andre MD  General Surgery, PGY 2   54 year old male was found down by neighbors (unknown duration) and brought in by EMS, reportedly was on a ladder and fell 8 feet found to be quadriplegic with C3-4 disc herniation and compression of spinal cord with cord edema and prevertebral soft tissue swelling at CT 1 -5.     PLAN:    - Patient currently in the OR with Neurosurgery will follow up progress  - No acute surgical intervention from Trauma surgery  - No bony injury identified on CT Head/C-spine/C/A/P  - Tertiary exam within 24 hours.   - Patient seen/examined with Attending Dr. April Andre MD  General Surgery, PGY 2

## 2020-10-14 NOTE — PRE-ANESTHESIA EVALUATION ADULT - NSANTHAIRWAYFT_ENT_ALL_CORE
cervical neck injury cervical neck injuryl pt in cervical collar; can do glidescope as per neurosurgery

## 2020-10-15 NOTE — OCCUPATIONAL THERAPY INITIAL EVALUATION ADULT - PERTINENT HX OF CURRENT PROBLEM, REHAB EVAL
54M s/p fall >10 feet from ladder and was found unable to move arms or legs on 10/14/20. He was seen in the [facility] ED where he was a Level 1 Trauma. He was noted to have an PETR A cervical cord injury.

## 2020-10-15 NOTE — H&P ADULT - NSHPPHYSICALEXAM_GEN_ALL_CORE
AOx3, wide awake, pupils 3R, EOMI, no facial, cranial vertex scalp lac (superficial), plegic x4, upper thoracic (?T2-4) sensory level.

## 2020-10-15 NOTE — PROGRESS NOTE ADULT - ASSESSMENT
54 year old male was found down by neighbors (unknown duration) and brought in by EMS, reportedly was on a ladder and fell 8 feet found to be quadriplegic with C3-4 disc herniation and compression of spinal cord with cord edema and prevertebral soft tissue swelling at CT 1 -5, now s/p C3-C5 laminectomy, C2-C6 instrumentation and fusion    PLAN:      - No acute surgical intervention from Trauma surgery  - No bony injury identified on CT Head/C-spine/C/A/P  - Tertiary exam within 24 hours.   - Care per NSICU    [facility]

## 2020-10-15 NOTE — PHARMACOTHERAPY INTERVENTION NOTE - COMMENTS
spoke with patient's son who provided local pharmacy. Contact patient's pharmacy and obtained medication history. Per family, unclear about compliance.   Home Medications:  amLODIPine 10 mg oral tablet: 1 tab(s) orally once a day (15 Oct 2020 15:46)  Flomax 0.4 mg oral capsule: 1 cap(s) orally once a day (at bedtime) (15 Oct 2020 15:46)  glimepiride 1 mg oral tablet: 3 milligram(s) orally once a day (15 Oct 2020 15:46)  metFORMIN 1000 mg oral tablet: 1 tab(s) orally 2 times a day (15 Oct 2020 15:46)  oxybutynin 10 mg/24 hr oral tablet, extended release: 1 tab(s) orally once a day (15 Oct 2020 15:46)  Zocor 10 mg oral tablet: 1 tab(s) orally once a day (at bedtime) (15 Oct 2020 15:46)

## 2020-10-15 NOTE — H&P ADULT - ASSESSMENT
48s YO M s/o fall off ladder (approx 10 feet) 2-3h prior to arriving as level 1 trauma, came in quadriplegic with soft pressures likely iso spinal shock. Found to have C3-4 central disc herniation w/ contusion of cord and cord edema extending through C5. Meredith A SCI. Immediate plan for decompression and fusion in OR.     - s/p 10 decadron, continue steroids postop  - MAP goals >85  - q1h neuro checks  - straight to OR, NSCU admission postop  - C collar/continue spine precautions

## 2020-10-15 NOTE — H&P ADULT - NSHPLABSRESULTS_GEN_ALL_CORE
Imaging:   MR C Spine:   C3-4 central disc herniation results in compression of the spinal cord with associated spinal cord edema from C3 through C4. Extensive prevertebral soft tissue swelling at C1 through C5 and at C7-T3 with probable disruption of the anterior longitudinal ligament. Interspinous ligamentous injury at C2-C6    Labs:                      10.2   15.75 )-----------( 329      ( 15 Oct 2020 02:58 )             30.9     10-15    137  |  101  |  10  ----------------------------<  252<H>  3.5   |  12<L>  |  0.68    Ca    7.5<L>      15 Oct 2020 02:58  Phos  9.9     10-15  Mg     1.6     10-15    TPro  7.1  /  Alb  4.3  /  TBili  0.5  /  DBili  x   /  AST  25  /  ALT  20  /  AlkPhos  56  10-14    PT/INR - ( 14 Oct 2020 18:53 )   PT: 12.1 sec;   INR: 1.01 ratio         PTT - ( 14 Oct 2020 18:53 )  PTT:24.5 sec

## 2020-10-15 NOTE — PROGRESS NOTE ADULT - SUBJECTIVE AND OBJECTIVE BOX
Central line placed for pressors. Desatured when sat up and improved when laid flat. Improved once belly decompressed. Weaned off insulin gtt onto NPH. Improved UE motor strength but LE still 0/5 strength. Central line placed for pressors. Desaturated when sat up and improved when laid flat. Improved once belly decompressed. Weaned off insulin gtt onto NPH. Improved UE motor strength but LE still 0/5 strength.     Awake, alert, nods appropriately, RUE deltoid 2/5 biceps transiently antigravity triceps 2/5, LUE deltoid 3/5, biceps transiently antigravity, triceps 2/5, LE 0/5, T5 sensory level (difficult overall motor exam as patient upset and did not want to fully cooperate)    Reassured patient and will re-examine in a bit.

## 2020-10-15 NOTE — PROGRESS NOTE ADULT - SUBJECTIVE AND OBJECTIVE BOX
Patient seen and examined    Overnight events: s/p cervical lami and fusion    Exam:  aaox3, FC, flaccid x4, no sensation

## 2020-10-15 NOTE — H&P ADULT - ATTENDING COMMENTS
Pt seen and examined.  Motor 0/5 BUE, BLE. C5 sensory level.  CT head and C-spine and MRI C-spine reviewed, demonstrates no significant cranial abnormalites, no spinal fractures, spinal cord signal abnormality at C3-C5 levels consistent with contusion.  Plan for emergent C3-5 laminectomy, C2-6 stabilization and fusion. Case d/w patient.

## 2020-10-15 NOTE — PROGRESS NOTE ADULT - SUBJECTIVE AND OBJECTIVE BOX
SUMMARY:  54 year-old Cantonese-speaking man who fell >10 feet from ladder and was found unable to move arms or legs on 10/14/20. He was seen in the [facility] ED where he was a Level 1 Trauma. He was noted to have an PETR A cervical cord injury with MRI notable for C3-4 central disc herniation resulting in compression of the spinal cord associated with spinal cord edema from C3 through C4 as well as extensive prevertebral soft tissue swelling at C1 through C5 and C7-T3 with probable disruption of the anterior longitudinal ligament and interspinous ligamentous injury at C2-C6 for which he was taken for emergent C3-5 laminectomy with C2-6 fusion.    24 HOUR EVENTS:  Admitted to NSCU.     VITALS/DATA/ORDERS: [x] Reviewed    EXAMINATION: SUMMARY:  54 year-old Cantonese-speaking man who fell >10 feet from ladder and was found unable to move arms or legs on 10/14/20. He was seen in the [facility] ED where he was a Level 1 Trauma. He was noted to have an PETR A cervical cord injury with MRI notable for C3-4 central disc herniation resulting in compression of the spinal cord associated with spinal cord edema from C3 through C4 as well as extensive prevertebral soft tissue swelling at C1 through C5 and C7-T3 with probable disruption of the anterior longitudinal ligament and interspinous ligamentous injury at C2-C6 for which he was taken for emergent C3-5 laminectomy with C2-6 fusion.    24 HOUR EVENTS:  Admitted to NSCU.     VITALS/DATA/ORDERS: [x] Reviewed    EXAMINATION:  Immediately post-op, he awoke and was responsive but was 0/5 in arms/legs. Later, deltoids were 3/5 and biceps/triceps 2/3 and hand  1/5 though legs still 0/5. SUMMARY:  54 year-old Cantonese-speaking man who fell >10 feet from ladder and was found unable to move arms or legs on 10/14/20. He was seen in the [facility] ED where he was a Level 1 Trauma. He was noted to have an PETR A cervical cord injury with MRI notable for C3-4 central disc herniation resulting in compression of the spinal cord associated with spinal cord edema from C3 through C4 as well as extensive prevertebral soft tissue swelling at C1 through C5 and C7-T3 with probable disruption of the anterior longitudinal ligament and interspinous ligamentous injury at C2-C6 for which he was taken for emergent C3-5 laminectomy with C2-6 fusion.    24 HOUR EVENTS:  Admitted to NSCU.     VITALS/DATA/ORDERS: [x] Reviewed    EXAMINATION:  Immediately post-op, he awoke and was responsive but was 0/5 in arms/legs. Later, deltoids were 3-/5 (transiently) and biceps/triceps 2/3 and hand  1/5 though legs still 0/5.

## 2020-10-15 NOTE — PATIENT PROFILE ADULT - FUNCTIONAL SCREEN CURRENT LEVEL: COMMUNICATION, MLM
3 = unable to speak (not related to language barrier) 3 = unable to speak (not related to language barrier)/pt intubated

## 2020-10-15 NOTE — PROGRESS NOTE ADULT - SUBJECTIVE AND OBJECTIVE BOX
SUMMARY:  54 year-old Cantonese-speaking man who fell >10 feet from ladder and was found unable to move arms or legs on 10/14/20. He was seen in the [facility] ED where he was a Level 1 Trauma. He was noted to have an PETR A cervical cord injury with MRI notable for C3-4 central disc herniation resulting in compression of the spinal cord associated with spinal cord edema from C3 through C4 as well as extensive prevertebral soft tissue swelling at C1 through C5 and C7-T3 with probable disruption of the anterior longitudinal ligament and interspinous ligamentous injury at C2-C6 for which he was taken for emergent C3-5 laminectomy with C2-6 fusion.    24 HOUR EVENTS:  Admitted to NSCU.     VITALS/DATA/ORDERS: [x] Reviewed    EXAMINATION:  Gen: sedated, intubated  HEENT: collar, perrl  CV: s1, s2   Resp: CTAB  Abd: soft nt/nd  extrem: wwp, pulses +2 throughout  neuro: eo to voice, fc with eyes, eomi, b/ delts 2/5, bi/tri 1-2, HG 1/5, LE flaccid and no sensation          ICU Vital Signs Last 24 Hrs  T(C): 36.9 (15 Oct 2020 05:00), Max: 36.9 (15 Oct 2020 05:00)  T(F): 98.5 (15 Oct 2020 05:00), Max: 98.5 (15 Oct 2020 05:00)  HR: 87 (15 Oct 2020 06:30) (77 - 97)  BP: 150/78 (14 Oct 2020 20:25) (91/63 - 150/78)  BP(mean): --  ABP: 135/71 (15 Oct 2020 06:30) (94/49 - 149/75)  ABP(mean): 96 (15 Oct 2020 06:30) (66 - 105)  RR: 20 (15 Oct 2020 06:30) (14 - 25)  SpO2: 100% (15 Oct 2020 06:30) (95% - 100%)      10-14-20 @ 07:01  -  10-15-20 @ 06:44  --------------------------------------------------------  IN: 943.7 mL / OUT: 925 mL / NET: 18.7 mL        Mode: AC/ CMV (Assist Control/ Continuous Mandatory Ventilation), RR (machine): 16, TV (machine): 550, FiO2: 40, PEEP: 5, ITime: 0.1, MAP: 10, PIP: 24    BACItracin   Ointment 1 Application(s) Topical two times a day  ceFAZolin   IVPB 2000 milliGRAM(s) IV Intermittent once  chlorhexidine 0.12% Liquid 15 milliLiter(s) Oral Mucosa every 12 hours  chlorhexidine 4% Liquid 1 Application(s) Topical <User Schedule>  dexMEDEtomidine Infusion 0.2 MICROgram(s)/kG/Hr (4.05 mL/Hr) IV Continuous <Continuous>  diphtheria/tetanus/pertussis (acellular) Vaccine (ADAcel) 0.5 milliLiter(s) IntraMuscular once  fentaNYL    Injectable 12.5 MICROGram(s) IV Push every 2 hours PRN  influenza   Vaccine 0.5 milliLiter(s) IntraMuscular once  insulin regular Infusion 2 Unit(s)/Hr (2 mL/Hr) IV Continuous <Continuous>  norepinephrine Infusion 0.05 MICROgram(s)/kG/Min (3.8 mL/Hr) IV Continuous <Continuous>  ondansetron Injectable 4 milliGRAM(s) IV Push every 6 hours PRN  pantoprazole  Injectable 40 milliGRAM(s) IV Push daily  phenylephrine    Infusion 0.1 MICROgram(s)/kG/Min (3.04 mL/Hr) IV Continuous <Continuous>  polyethylene glycol 3350 17 Gram(s) Oral two times a day  senna 2 Tablet(s) Oral at bedtime  sodium chloride 0.9% 1000 milliLiter(s) (100 mL/Hr) IV Continuous <Continuous>      LABS:  Na: 137 (10-15 @ 02:58), 137 (10-14 @ 18:53)  K: 3.5 (10-15 @ 02:58), 3.5 (10-14 @ 18:53)  Cl: 101 (10-15 @ 02:58), 103 (10-14 @ 18:53)  CO2: 12 (10-15 @ 02:58), 19 (10-14 @ 18:53)  BUN: 10 (10-15 @ 02:58), 13 (10-14 @ 18:53)  Cr: 0.68 (10-15 @ 02:58), 1.07 (10-14 @ 18:53)  Glu: 252(10-15 @ 02:58), 122(10-14 @ 18:53)    Hgb: 10.2 (10-15 @ 02:58), 10.4 (10-14 @ 18:53)  Hct: 30.9 (10-15 @ 02:58), 32.5 (10-14 @ 18:53)  WBC: 15.75 (10-15 @ 02:58), 7.28 (10-14 @ 18:53)  Plt: 329 (10-15 @ 02:58), 324 (10-14 @ 18:53)    INR: 1.01 10-14-20 @ 18:53  PTT: 24.5 10-14-20 @ 18:53          LIVER FUNCTIONS - ( 14 Oct 2020 18:53 )  Alb: 4.3 g/dL / Pro: 7.1 g/dL / ALK PHOS: 56 U/L / ALT: 20 U/L / AST: 25 U/L / GGT: x           ABG - ( 15 Oct 2020 02:54 )  pH, Arterial: 7.30  pH, Blood: x     /  pCO2: 32    /  pO2: 300   / HCO3: 15    / Base Excess: -9.7  /  SaO2: 100                  SUMMARY:  54 year-old Cantonese-speaking man who fell >10 feet from ladder and was found unable to move arms or legs on 10/14/20. He was seen in the [facility] ED where he was a Level 1 Trauma. He was noted to have an PETR A cervical cord injury with MRI notable for C3-4 central disc herniation resulting in compression of the spinal cord associated with spinal cord edema from C3 through C4 as well as extensive prevertebral soft tissue swelling at C1 through C5 and C7-T3 with probable disruption of the anterior longitudinal ligament and interspinous ligamentous injury at C2-C6 for which he was taken for emergent C3-5 laminectomy with C2-6 fusion.    24 HOUR EVENTS:  Admitted to NSCU.     VITALS/DATA/ORDERS: [x] Reviewed    EXAMINATION:  Gen: sedated, intubated  HEENT: collar, perrl  CV: s1, s2   Resp: CTAB  Abd: soft nt/nd  extrem: wwp, pulses +2 throughout  neuro: eo to voice, fc with eyes, eomi, b/ delts 2/5, bi/tri 1-2, HG 1/5, LE flaccid and no sensation          ICU Vital Signs Last 24 Hrs  T(C): 36.9 (15 Oct 2020 05:00), Max: 36.9 (15 Oct 2020 05:00)  T(F): 98.5 (15 Oct 2020 05:00), Max: 98.5 (15 Oct 2020 05:00)  HR: 87 (15 Oct 2020 06:30) (77 - 97)  BP: 150/78 (14 Oct 2020 20:25) (91/63 - 150/78)  ABP: 135/71 (15 Oct 2020 06:30) (94/49 - 149/75)  ABP(mean): 96 (15 Oct 2020 06:30) (66 - 105)  RR: 20 (15 Oct 2020 06:30) (14 - 25)  SpO2: 100% (15 Oct 2020 06:30) (95% - 100%)      10-14-20 @ 07:01  -  10-15-20 @ 06:44  --------------------------------------------------------  IN: 943.7 mL / OUT: 925 mL / NET: 18.7 mL        Mode: AC/ CMV (Assist Control/ Continuous Mandatory Ventilation), RR (machine): 16, TV (machine): 550, FiO2: 40, PEEP: 5, ITime: 0.1, MAP: 10, PIP: 24    BACItracin   Ointment 1 Application(s) Topical two times a day  ceFAZolin   IVPB 2000 milliGRAM(s) IV Intermittent once  chlorhexidine 0.12% Liquid 15 milliLiter(s) Oral Mucosa every 12 hours  chlorhexidine 4% Liquid 1 Application(s) Topical <User Schedule>  dexMEDEtomidine Infusion 0.2 MICROgram(s)/kG/Hr (4.05 mL/Hr) IV Continuous <Continuous>  diphtheria/tetanus/pertussis (acellular) Vaccine (ADAcel) 0.5 milliLiter(s) IntraMuscular once  fentaNYL    Injectable 12.5 MICROGram(s) IV Push every 2 hours PRN  influenza   Vaccine 0.5 milliLiter(s) IntraMuscular once  insulin regular Infusion 2 Unit(s)/Hr (2 mL/Hr) IV Continuous <Continuous>  norepinephrine Infusion 0.05 MICROgram(s)/kG/Min (3.8 mL/Hr) IV Continuous <Continuous>  ondansetron Injectable 4 milliGRAM(s) IV Push every 6 hours PRN  pantoprazole  Injectable 40 milliGRAM(s) IV Push daily  phenylephrine    Infusion 0.1 MICROgram(s)/kG/Min (3.04 mL/Hr) IV Continuous <Continuous>  polyethylene glycol 3350 17 Gram(s) Oral two times a day  senna 2 Tablet(s) Oral at bedtime  sodium chloride 0.9% 1000 milliLiter(s) (100 mL/Hr) IV Continuous <Continuous>      LABS:  10-15    139  |  106  |  10  ----------------------------<  203<H>  4.1   |  12<L>  |  0.66    Ca    7.4<L>      15 Oct 2020 07:37  Phos  9.9     10-15  Mg     1.6     10-15    TPro  7.1  /  Alb  4.3  /  TBili  0.5  /  DBili  x   /  AST  25  /  ALT  20  /  AlkPhos  56  10-14    Na: 137 (10-15 @ 02:58), 137 (10-14 @ 18:53)  K: 3.5 (10-15 @ 02:58), 3.5 (10-14 @ 18:53)  Cl: 101 (10-15 @ 02:58), 103 (10-14 @ 18:53)  CO2: 12 (10-15 @ 02:58), 19 (10-14 @ 18:53)  BUN: 10 (10-15 @ 02:58), 13 (10-14 @ 18:53)  Cr: 0.68 (10-15 @ 02:58), 1.07 (10-14 @ 18:53)  Glu: 252(10-15 @ 02:58), 122(10-14 @ 18:53)    Hgb: 10.2 (10-15 @ 02:58), 10.4 (10-14 @ 18:53)  Hct: 30.9 (10-15 @ 02:58), 32.5 (10-14 @ 18:53)  WBC: 15.75 (10-15 @ 02:58), 7.28 (10-14 @ 18:53)  Plt: 329 (10-15 @ 02:58), 324 (10-14 @ 18:53)    INR: 1.01 10-14-20 @ 18:53  PTT: 24.5 10-14-20 @ 18:53    Tox screen _ P          LIVER FUNCTIONS - ( 14 Oct 2020 18:53 )  Alb: 4.3 g/dL / Pro: 7.1 g/dL / ALK PHOS: 56 U/L / ALT: 20 U/L / AST: 25 U/L / GGT: x           ABG - ( 15 Oct 2020 02:54 )  pH, Arterial: 7.30  pH, Blood: x     /  pCO2: 32    /  pO2: 300   / HCO3: 15    / Base Excess: -9.7  /  SaO2: 100       MR Thoracic Spine No Cont (10.14.20 @ 20:25) >  CLINICAL INDICATION: Evaluate for spinal cord compression, status post fall off of ladder    TECHNIQUE: Multiplanar, multisequence MR images of the cervical spine were obtained without the administration of intravenous contrast.    COMPARISON: Cervical spine CT scan dated 10/14/2020    FINDINGS: There is straightening of the normal cervical lordosis. There is prevertebral soft tissue swelling and edema extending from the C1 to the C5 vertebral body. There appears to be disruption of the anterior longitudinal ligament. There is elevated signal intensity within the interspinous spaces at C2-3, C3-4, C4-5 and C5-6 consistent with ligamentous injury. Additional prevertebral edema is noted more inferiorly at C7-T3 possibly with anterior longitudinal ligament disruption.    There is acentral disc herniation at the C3-4 level which is resulting in spinal cord compression. There is increased signal intensity within the spinal cord extending from the C3 to the C4 level consistent with spinal cord edema.    There is straightening of the normal cervical lordosis. There is disc desiccation diffusely throughout the spine.    The C2-3 disc space is remarkable for a small central disc protrusion.    C3-4: Central disc herniation compresses the spinal cord as above. Bilateral uncovertebral joint hypertrophy contributes to moderate bilateral neural foraminal stenosis.    C4-5: Anterior osteophytes, disc space narrowing and a central disc protrusion abuts the spinal cord. Bilateral uncovertebral joint hypertrophic changes narrow the neural foramina bilaterally.    See 5-6: Posterior osteophytes and circumferential disc bulging with uncovertebral joint hypertrophy. Mild central canal moderate bilateral neural foraminal stenosis.    C6-7: Disc bulging.    C7/T1: Well-maintained.    The marrow signal is normal.    Thoracic spine MRI:    Limited imaging of the thoracic spine was performed with sagittal images.    The normal thoracic kyphosis is maintained. There is no evidence for spinal cord compression. A small disc herniation is suspected within the midthoracic region. There is mild anterior spondylosis.    Prevertebral soft tissue edema is noted within the upper thoracic spine extending from the cervical region.    Impression:    Cervical spine MRI: C3-4 central disc herniation results in compression of the spinal cord with associated spinal cord edema from C3 through C4. Extensive prevertebral soft tissue swelling at C1 through C5 and at C7-T3 with probable disruption of the anterior longitudinal ligament. Interspinous ligamentous injury at C2-C6.    Thoracic spine MRI: No evidence for spinal cord compression.

## 2020-10-15 NOTE — PROGRESS NOTE ADULT - SUBJECTIVE AND OBJECTIVE BOX
SURGERY PROGRESS NOTE    SUBJECTIVE / 24H EVENTS:  Patient seen and examined on morning rounds. No acute events overnight.  pt is now s/p C3-C5 laminectomy, C2-C6 instrumentation and fusion. Currently intubated and sedated on precedex in NSICU.          OBJECTIVE:  VITAL SIGNS:  T(C): 36.8 (10-15-20 @ 11:00), Max: 36.9 (10-15-20 @ 05:00)  HR: 69 (10-15-20 @ 16:30) (58 - 97)  BP: 150/78 (10-14-20 @ 20:25) (91/63 - 150/78)  RR: 20 (10-15-20 @ 16:30) (12 - 30)  SpO2: 95% (10-15-20 @ 16:30) (85% - 100%)  Daily     Daily Weight in k (14 Oct 2020 19:09)  POCT Blood Glucose.: 156 mg/dL (10-15-20 @ 15:14)  POCT Blood Glucose.: 136 mg/dL (10-15-20 @ 14:00)  POCT Blood Glucose.: 107 mg/dL (10-15-20 @ 12:55)      General: NAD  HEENT: Normocephalic, repaired frontal scalp laceration with running nylon?   Neck: Soft, midline trachea, C-collar in place  Chest: No chest wall tenderness  Cardiac: RRR  Respiratory: symmetric chest rise  Abdomen: Soft, non-distended, non-tender, no rebound, no guarding, no masses palpated  Pelvis: Stable, non-tender, no ecchymosis  Ext: intubated and sedated, unable to assess, right knee abrasion  Back: no TTP, no palpable runoff/stepoff/deformity        10-14-20 @ 07:01  -  10-15-20 @ 07:00  --------------------------------------------------------  IN:    Dexmedetomidine: 16.4 mL    Insulin: 17 mL    IV PiggyBack: 150 mL    multiple electrolytes Injection Type 1 Bolus: 1000 mL    Norepinephrine: 86 mL    Norepinephrine: 117.8 mL    Phenylephrine: 82.1 mL    Phenylephrine: 38.2 mL    sodium chloride 0.9%: 200 mL    sodium chloride 0.9% w/ Additives: 400 mL  Total IN: 2107.5 mL    OUT:    Indwelling Catheter - Urethral (mL): 1125 mL  Total OUT: 1125 mL    Total NET: 982.5 mL      10-15-20 @ 07:01  -  10-15-20 @ 16:40  --------------------------------------------------------  IN:    Dexmedetomidine: 48.9 mL    Glucerna: 75 mL    Insulin: 18 mL    IV PiggyBack: 100 mL    Norepinephrine: 84.3 mL    Phenylephrine: 139.9 mL    sodium chloride 0.9% w/ Additives: 100 mL    sodium chloride 0.9% w/ Additives: 680 mL  Total IN: 1246.1 mL    OUT:    Indwelling Catheter - Urethral (mL): 1550 mL  Total OUT: 1550 mL    Total NET: -303.9 mL          LAB VALUES:  10-15    130<L>  |  103  |  10  ----------------------------<  160<H>  5.3   |  15<L>  |  0.66    Ca    8.3<L>      15 Oct 2020 15:09  Phos  9.9     10-15  Mg     1.6     10-15    TPro  7.1  /  Alb  4.3  /  TBili  0.5  /  DBili  x   /  AST  25  /  ALT  20  /  AlkPhos  56  10-14                               10.2   15.75 )-----------( 329      ( 15 Oct 2020 02:58 )             30.9     LIVER FUNCTIONS - ( 14 Oct 2020 18:53 )  Alb: 4.3 g/dL / Pro: 7.1 g/dL / ALK PHOS: 56 U/L / ALT: 20 U/L / AST: 25 U/L / GGT: x           PT/INR - ( 14 Oct 2020 18:53 )   PT: 12.1 sec;   INR: 1.01 ratio         PTT - ( 14 Oct 2020 18:53 )  PTT:24.5 sec  ABG - ( 15 Oct 2020 15:06 )  pH, Arterial: 7.34  pH, Blood: x     /  pCO2: 35    /  pO2: 68    / HCO3: 18    / Base Excess: -6.4  /  SaO2: 93            MICROBIOLOGY:      RADIOLOGY:  PACS Image: Image(s) Available (10-15-20 @ 15:50)  PACS Image: Image(s) Available (10-15-20 @ 14:10)  PACS Image: Image(s) Available (10-15-20 @ 03:13)  PACS Image: Image(s) Available (10-14-20 @ 20:25)  PACS Image: Image(s) Available (10-14-20 @ 20:25)  PACS Image: Image(s) Available (10-14-20 @ 20:04)  PACS Image: Image(s) Available (10-14-20 @ 19:23)  PACS Image: Image(s) Available (10-14-20 @ 19:22)  PACS Image: Image(s) Available (10-14-20 @ 19:22)  PACS Image: Image(s) Available (10-14-20 @ 19:22)  PACS Image: Image(s) Available (10-14-20 @ 19:22)  PACS Image: Image(s) Available (10-14-20 @ 19:22)  PACS Image: Image(s) Available (10-14-20 @ 19:06)        MEDICATIONS  (STANDING):  BACItracin   Ointment 1 Application(s) Topical two times a day  chlorhexidine 0.12% Liquid 15 milliLiter(s) Oral Mucosa every 12 hours  chlorhexidine 4% Liquid 1 Application(s) Topical <User Schedule>  chlorhexidine 4% Liquid 1 Application(s) Topical <User Schedule>  dexMEDEtomidine Infusion 0.2 MICROgram(s)/kG/Hr (4.05 mL/Hr) IV Continuous <Continuous>  dextrose 5%. 1000 milliLiter(s) (50 mL/Hr) IV Continuous <Continuous>  dextrose 50% Injectable 12.5 Gram(s) IV Push once  dextrose 50% Injectable 25 Gram(s) IV Push once  dextrose 50% Injectable 25 Gram(s) IV Push once  diphtheria/tetanus/pertussis (acellular) Vaccine (ADAcel) 0.5 milliLiter(s) IntraMuscular once  enoxaparin Injectable 40 milliGRAM(s) SubCutaneous <User Schedule>  influenza   Vaccine 0.5 milliLiter(s) IntraMuscular once  insulin lispro (HumaLOG) corrective regimen sliding scale.   SubCutaneous every 4 hours  insulin NPH human recombinant 3 Unit(s) SubCutaneous every 8 hours  metoclopramide Injectable 10 milliGRAM(s) IV Push once  norepinephrine Infusion 0.05 MICROgram(s)/kG/Min (3.8 mL/Hr) IV Continuous <Continuous>  pantoprazole  Injectable 40 milliGRAM(s) IV Push daily  polyethylene glycol 3350 17 Gram(s) Oral two times a day  senna 2 Tablet(s) Oral at bedtime  sodium chloride 0.9% with potassium chloride 20 mEq/L 1000 milliLiter(s) (60 mL/Hr) IV Continuous <Continuous>  sodium chloride 3%  Inhalation 3 milliLiter(s) Inhalation every 6 hours    MEDICATIONS  (PRN):  dextrose 40% Gel 15 Gram(s) Oral once PRN Blood Glucose LESS THAN 70 milliGRAM(s)/deciliter  fentaNYL    Injectable 12.5 MICROGram(s) IV Push every 2 hours PRN Moderate Pain (4 - 6)  glucagon  Injectable 1 milliGRAM(s) IntraMuscular once PRN Glucose LESS THAN 70 milligrams/deciliter  ondansetron Injectable 4 milliGRAM(s) IV Push every 6 hours PRN Nausea and/or Vomiting  sodium chloride 0.9% lock flush 10 milliLiter(s) IV Push every 1 hour PRN Pre/post blood products, medications, blood draw, and to maintain line patency

## 2020-10-15 NOTE — OCCUPATIONAL THERAPY INITIAL EVALUATION ADULT - PRECAUTIONS/LIMITATIONS, REHAB EVAL
MRI notable for C3-4 central disc herniation resulting in compression of the spinal cord associated with spinal cord edema from C3 through C4 as well as extensive prevertebral soft tissue swelling at C1 through C5 and C7-T3 with probable disruption of the anterior longitudinal ligament and interspinous ligamentous injury at C2-C6 for which he was taken for emergent C3-5 laminectomy with C2-6 fusion.

## 2020-10-15 NOTE — PROGRESS NOTE ADULT - ASSESSMENT
ASSESSMENT/PLAN: Traumatic spinal cord injury/PETR A cervical cord injury status post C3-5 laminectomy with C2-6 fusion, post-operative day 0    NEURO:  Pain control  Monitor drain output  Sedation for confort with intubation  Activity: [] mobilize as tolerated [] Bedrest [x] PT [x] OT [x] PMNR eval for SCI facility    PULM:  Pressure support as tolerated  May need tracheostomy given high cervical injury  Aspiration precautions  VAP bundle    CV:  MAP >85mmHg for cord perfusion    RENAL:  Fluids: IVF    GI:  Diet: tube feeds  GI prophylaxis [] not indicated [x] PPI: intubated [] other:  Bowel regimen     ENDO:   Goal euglycemia (-180)    HEME/ONC:  Screen for VTE  High risk for VTE - may need IVCF  VTE prophylaxis: [x] SCDs [] chemoprophylaxis [x] hold chemoprophylaxis due to: fresh post-op [x] high risk of DVT/PE on admission due to: trauma    ID:  Gabriella-op antibiotics     SOCIAL/FAMILY:  [x] awaiting [] updated at bedside [] family meeting    CODE STATUS:  [x] Full Code [] DNR [] DNI [] Palliative/Comfort Care    DISPOSITION:  [x] ICU [] Stroke Unit [] Floor [] EMU [] RCU [] PCU    [x] Patient is at high risk of neurologic deterioration/death due to: cord compression, acute respiratory failure    Contact: [phone]

## 2020-10-15 NOTE — PROGRESS NOTE ADULT - ASSESSMENT
ASSESSMENT/PLAN: Traumatic spinal cord injury/PETR A cervical cord injury status post C3-5 laminectomy with C2-6 fusion, post-operative day 1    NEURO:  Pain control  dex x1 given  Monitor drain output  spinal precautions  Sedation: precedex, fent prn  Activity: [] mobilize as tolerated [] Bedrest [x] PT [x] OT [x] PMNR eval for SCI facility    PULM:  Pressure support as tolerated  May need tracheostomy given high cervical injury  Aspiration precautions  VAP bundle    CV:  MAP >85mmHg for cord perfusion  levo gtt  tte    RENAL:  Fluids: IVF    GI:  Diet: tube feeds  GI prophylaxis [] not indicated [x] PPI: intubated [] other:  Bowel regimen     ENDO:   Goal euglycemia (-180)  a1c  insulin gtt for dysglycemia/dka    HEME/ONC:  Screen for VTE  High risk for VTE - may need IVCF  VTE prophylaxis: [x] SCDs [] chemoprophylaxis [x] hold chemoprophylaxis due to: fresh post-op [x] high risk of DVT/PE on admission due to: trauma    ID:  Gabriella-op antibiotics     SOCIAL/FAMILY:  [x] awaiting [] updated at bedside [] family meeting    CODE STATUS:  [x] Full Code [] DNR [] DNI [] Palliative/Comfort Care    DISPOSITION:  [x] ICU [] Stroke Unit [] Floor [] EMU [] RCU [] PCU    [x] Patient is at high risk of neurologic deterioration/death due to: cord compression, acute respiratory failure   ASSESSMENT/PLAN: Traumatic spinal cord injury/PETR A cervical cord injury status post C3-5 laminectomy with C2-6 fusion, post-operative day 1    NEURO:  Pain control  dex x1 given  Monitor drain output  spinal precautions  Sedation: precedex, fent prn  Activity: [] mobilize as tolerated [] Bedrest [x] PT [x] OT - splint B/L UE and LE [x] PMNR eval for SCI facility    PULM:  Pressure support as tolerated  May need tracheostomy given high cervical injury  Aspiration precautions  VAP bundle    CV:  MAP >85mmHg for cord perfusion  levo gtt  ECHO / EKG  tte    RENAL:  Fluids: IVF    GI:  Diet: tube feeds- Glycerna  GI prophylaxis [] not indicated [x] PPI: intubated [] other:  Bowel regimen     ENDO:  Hypocalcemia/ Diabetic Ketoacidosis  Goal euglycemia (-180)  a1c  insulin gtt for dysglycemia/dka    HEME/ONC:  Screen for VTE  High risk for VTE - may need IVCF  VTE prophylaxis: [x] SCDs  [x] high risk of DVT/PE on admission due to: trauma    ID:  Gabriella-op antibiotics     SOCIAL/FAMILY:  [x] awaiting     CODE STATUS:  [x] Full Code     DISPOSITION:  [x] ICU     [x] Patient is at high risk of neurologic deterioration/death due to: cord compression, acute respiratory failure

## 2020-10-15 NOTE — PROGRESS NOTE ADULT - ASSESSMENT
Traumatic spinal cord injury; C3-4 central cord herniation status post decompression/fusion, post-operative day 1  - q1hr Nueor checks  - MAP >85mmHg for cord perfusion on pressor support  - Pain control  - Monitor drain output  - Wean vent as tolerated/extubate but may need tracheostomy given cervical injury  - Monitor Na Traumatic spinal cord injury; C3-4 central cord herniation status post decompression/fusion, post-operative day 1  - q1hr Neuro checks  - MAP >85mmHg for cord perfusion on pressor support  - Pain control  - Monitor drain output  - Wean vent as tolerated/extubate but may need tracheostomy given cervical injury  - Monitor Na  - Monitor FS off insulin gtt, adjust NPH as needed

## 2020-10-15 NOTE — H&P ADULT - HISTORY OF PRESENT ILLNESS
· HPI Objective Statement: 53yo M pmhx DM BIBEMS s/p fall from ladder >10 ft, was laying on floor about 1 hr, unable to move upper or lower extremities. Awake, protecting airway, answers questions appropriately. Denies anticoagulant use.

## 2020-10-16 NOTE — PHYSICAL THERAPY INITIAL EVALUATION ADULT - STRENGTHENING, PT EVAL
GOAL: Pt will improve bilateral shoulder, wrist, fingers to 1/5, b/l elbows to 3+/5, b/l LE's strength to 1/5, for increased limb stability, in 4 weeks.

## 2020-10-16 NOTE — DIETITIAN INITIAL EVALUATION ADULT. - ENTERAL
1) Recommend change EN feeds: Glucerna 1.2 at GOAL rate 75ml/hr x 24 hrs. To provide: 1800ml, 2160kcal and 108g protein. Based on dosing wt 81kg, provides: 26.7kcal/kg and 1.3g protein/kg.

## 2020-10-16 NOTE — PHYSICAL THERAPY INITIAL EVALUATION ADULT - ASR WT BEARING STATUS EVAL
no weight-bearing restrictions Composite Graft Text: The defect edges were debeveled with a #15 scalpel blade.  Given the location of the defect, shape of the defect, the proximity to free margins and the fact the defect was full thickness a composite graft was deemed most appropriate.  The defect was outline and then transferred to the donor site.  A full thickness graft was then excised from the donor site. The graft was then placed in the primary defect, oriented appropriately and then sutured into place.  The secondary defect was then repaired using a primary closure.

## 2020-10-16 NOTE — PROGRESS NOTE ADULT - ASSESSMENT
ASSESSMENT/PLAN: Traumatic spinal cord injury/PETR A cervical cord injury status post C3-5 laminectomy with C2-6 fusion, post-operative day 2    NEURO:  Pain control  Monitor drain output  spinal precautions  Sedation: precedex, fent prn  Activity: [] mobilize as tolerated [] Bedrest [x] PT [x] OT - splint B/L UE and LE [x] PMNR eval for SCI facility    PULM:  Pressure support as tolerated  May need tracheostomy given high cervical injury  Aspiration precautions  VAP bundle    CV:  MAP >85mmHg for cord perfusion  levo gtt  ECHO / EKG  tte    RENAL:  Fluids: 2% for siadh    GI:  Diet: tube feeds- Glycerna  GI prophylaxis [] not indicated [x] PPI: intubated [] other:  Bowel regimen     ENDO:    DM, out of dka, off insulin gtt  Goal euglycemia (-180)  a1c 6.3    HEME/ONC:  Screen for VTE  High risk for VTE - may need IVCF  VTE prophylaxis: [x] SCDs  [x] high risk of DVT/PE on admission due to: trauma    ID:  Gabriella-op antibiotics     SOCIAL/FAMILY:  [x] awaiting     CODE STATUS:  [x] Full Code     DISPOSITION:  [x] ICU     [x] Patient is at high risk of neurologic deterioration/death due to: cord compression, acute respiratory failure   ASSESSMENT/PLAN: Traumatic spinal cord injury/PETR A cervical cord injury status post C3-5 laminectomy with C2-6 fusion, post-operative day 2    NEURO:  Pain control  Monitor drain output  spinal precautions  Sedation: precedex, fent prn  Activity: [] mobilize as tolerated [] Bedrest [x] PT [x] OT - splint B/L UE and LE [x] PMNR eval for SCI facility    PULM:  Pressure support as tolerated  May need tracheostomy given high cervical injury  Aspiration precautions  VAP bundle    CV:  MAP >85mmHg for cord perfusion  levo gtt  tte- stable lvef    RENAL:  Fluids: siadh, resolved, ivl  monitor electrolytes    GI:  Diet: tube feeds  GI prophylaxis [] not indicated [x] PPI: intubated [] other:  Bowel regimen, reglan prn     ENDO:    DM, out of dka, off insulin gtt  nph 4q6  Goal euglycemia (-180)  a1c 6.3    HEME/ONC:  Screen for VTE  High risk for VTE - may need IVCF  VTE prophylaxis: [x] SCDs  [x] high risk of DVT/PE on admission due to: trauma    ID:  Gabriella-op antibiotics     SOCIAL/FAMILY:  [x] awaiting     CODE STATUS:  [x] Full Code     DISPOSITION:  [x] ICU    [x] Patient is at high risk of neurologic deterioration/death due to: cord compression, acute respiratory failure    MISC: L subclavian (10/15)

## 2020-10-16 NOTE — PHYSICAL THERAPY INITIAL EVALUATION ADULT - PERTINENT HX OF CURRENT PROBLEM, REHAB EVAL
55 yo M pmhx DM BIBEMS s/p fall from ladder >10 ft, was laying on floor about 1 hr, unable to move upper or lower extremities. Awake, protecting airway, answers questions appropriately. Denies anticoagulant use. CT spine: C3-4 central disc herniation results in compression of the spinal cord with associated spinal cord edema from C3 through C4.

## 2020-10-16 NOTE — PROGRESS NOTE ADULT - SUBJECTIVE AND OBJECTIVE BOX
Family relayed patient's wish to be DNR. Still on norepinephrine but weaned off phenylephrine.      Intubated, eyes open to voice, EOMI, follows with eyes/face, nods appropriately, LUE deltoid 3/5 triceps 2/5 biceps 3/5 HG 1/5, RUE deltoid 2/5 biceps 3/5 triceps 4-/5, HG 1/5, BLE flaccid 0/5, T5 sensory level

## 2020-10-16 NOTE — DIETITIAN INITIAL EVALUATION ADULT. - ADD RECOMMEND
1) Obtain further subjective wt/diet history from pt/family PRN. 2) Monitor GI tolerance. RD to remain available to adjust EN formulary, volume/rate PRN. 3) Monitor wt trends, nutrition related labs, skin, hydration status and bowel regularity.

## 2020-10-16 NOTE — PHYSICAL THERAPY INITIAL EVALUATION ADULT - MANUAL MUSCLE TESTING RESULTS, REHAB EVAL
Trace movement b/l shoulders, 0/5 b/l wrists and fingers, L elbow 2/5, R elbow 3-/5, b/l LE's and trunk 0/5.

## 2020-10-16 NOTE — DIETITIAN INITIAL EVALUATION ADULT. - PHYSCIAL ASSESSMENT
unable to conduct full Nutrition Focused Physical Assessment due to ICU needs; spinal injury Ht:  70" (predicted based on visual assessment)  Wt: 178.2 lbs  BMI: 25.7 kg/m2   IBW: 166 lbs (+/-10%)     107 % IBW  Edema: none noted           Skin: surgical incision posterior lami site; knee abraision

## 2020-10-16 NOTE — PROGRESS NOTE ADULT - SUBJECTIVE AND OBJECTIVE BOX
Patient seen and examined    Overnight events: s/p cervical lami and fusion, motor exam improving    Exam:  Intubated, EO to voice, EOMI, FC w/ eyes, nods appropriately, LUE delts 3-4/5, LUE  triceps 2/5, LUE biceps 3/5, HG 1/5, RUE delts 2-3/5,  RUE biceps 3/5, RUE tri 2/5, HG 1/5,  LE flaccid, T5 sensory level

## 2020-10-16 NOTE — PHYSICAL THERAPY INITIAL EVALUATION ADULT - ADDITIONAL COMMENTS
Per Son on phone, prior to admission pt reports being independent of all ADL's & functional mobility without AD. Pt resides in house with spouse, 3 steps to enter, (+) HR, pt resides on first floor. Pt has walk in shower. (+) driving.

## 2020-10-16 NOTE — DIETITIAN INITIAL EVALUATION ADULT. - CHIEF COMPLAINT
The patient is a 54y M who fell from > 10 feet from ladder and found unable to move arms or legs on 10/14/20. Noted to have a PETR A cervical cord injury with MRI notable for C3-4 central disc herniation resulting in compression of the spinal cord associated with spinal cord edema from C3 through C4 as well as extensive preveterterbral soft tissue swelling at C1 through C5 and C7-T3 with probable disruption of the anterior longitudinal ligament and interspinous ligamentous injury at C2-C6 for which he was taken for emergent C3-5 laminectomy with C2-6 fusion. Remains intubated at this time with collar. Out of DKA; off insulin gtt.

## 2020-10-16 NOTE — PHYSICAL THERAPY INITIAL EVALUATION ADULT - GENERAL OBSERVATIONS, REHAB EVAL
Pt received supine in bed, A&Ox3 (via head nodd), +spouse present, +intubated, +HMV, +a-line, +sequentials, +IVL's, +tele, +cont pulse ox, agreeable to physical therapy davi rivero 30 min.

## 2020-10-16 NOTE — PHYSICAL THERAPY INITIAL EVALUATION ADULT - PRECAUTIONS/LIMITATIONS, REHAB EVAL
CONT: Extensive prevertebral soft tissue swelling at C1 through C5 and at C7-T3 with probable disruption of the anterior longitudinal ligament. Interspinous ligamentous injury at C2-C6. Pt s/p C3-C5 laminectomy, C2-C6 instrumentation and fusion on 10/15/20. Partial clearing, right base./spinal precautions/surgical precautions/fall precautions

## 2020-10-16 NOTE — DIETITIAN INITIAL EVALUATION ADULT. - PERTINENT LABORATORY DATA
(10/16): Hgb 10.4, Hct 32.1, POCT Blood Glucose 169, 138, 173; Lnm140  (10/15): A1c 6.3%, Estimated Average Glucose 134

## 2020-10-16 NOTE — PHYSICAL THERAPY INITIAL EVALUATION ADULT - IMPAIRMENTS FOUND, PT EVAL
fine motor/tone/gait, locomotion, and balance/muscle strength/sensory integrity/ROM/aerobic capacity/endurance

## 2020-10-16 NOTE — PHYSICAL THERAPY INITIAL EVALUATION ADULT - TRANSFER TRAINING, PT EVAL
GOAL: Pt will perform ALL transfers with maxA, w/use of appropriate assistive device as needed, in 4 weeks.

## 2020-10-16 NOTE — PROGRESS NOTE ADULT - SUBJECTIVE AND OBJECTIVE BOX
SUMMARY:  54 year-old Cantonese-speaking man who fell >10 feet from ladder and was found unable to move arms or legs on 10/14/20. He was seen in the [facility] ED where he was a Level 1 Trauma. He was noted to have an PETR A cervical cord injury with MRI notable for C3-4 central disc herniation resulting in compression of the spinal cord associated with spinal cord edema from C3 through C4 as well as extensive prevertebral soft tissue swelling at C1 through C5 and C7-T3 with probable disruption of the anterior longitudinal ligament and interspinous ligamentous injury at C2-C6 for which he was taken for emergent C3-5 laminectomy with C2-6 fusion.    24 HOUR EVENTS:  siadh    VITALS/DATA/ORDERS: [x] Reviewed    EXAMINATION:  Gen: intubated  HEENT: collar, perrl  CV: s1, s2   Resp: CTAB  Abd: soft nt/nd  extrem: wwp, pulses +2 throughout  neuro: eo spont, fc with eyes/nods head x2-3 with choices, eomi, b/l delts 3/5, bi/tri 3/5, HG 1/5, LE flaccid and t5 sensory level                ICU Vital Signs Last 24 Hrs  T(C): 36.5 (15 Oct 2020 23:00), Max: 37.2 (15 Oct 2020 15:00)  T(F): 97.7 (15 Oct 2020 23:00), Max: 98.9 (15 Oct 2020 15:00)  HR: 73 (16 Oct 2020 05:37) (58 - 90)  BP: --  BP(mean): --  ABP: 151/84 (16 Oct 2020 04:45) (111/76 - 156/82)  ABP(mean): 111 (16 Oct 2020 04:45) (82 - 112)  RR: 20 (16 Oct 2020 04:45) (12 - 30)  SpO2: 100% (16 Oct 2020 05:37) (85% - 100%)      10-15-20 @ 07:01  -  10-16-20 @ 07:00  --------------------------------------------------------  IN: 2098.8 mL / OUT: 3000 mL / NET: -901.2 mL        Mode: CPAP with PS, FiO2: 40, PEEP: 5, PS: 10, MAP: 8, PIP: 15    BACItracin   Ointment 1 Application(s) Topical two times a day  chlorhexidine 0.12% Liquid 15 milliLiter(s) Oral Mucosa every 12 hours  chlorhexidine 4% Liquid 1 Application(s) Topical <User Schedule>  chlorhexidine 4% Liquid 1 Application(s) Topical <User Schedule>  dexMEDEtomidine Infusion 0.2 MICROgram(s)/kG/Hr (4.05 mL/Hr) IV Continuous <Continuous>  dextrose 5%. 1000 milliLiter(s) (50 mL/Hr) IV Continuous <Continuous>  dextrose 50% Injectable 12.5 Gram(s) IV Push once  dextrose 50% Injectable 25 Gram(s) IV Push once  diphtheria/tetanus/pertussis (acellular) Vaccine (ADAcel) 0.5 milliLiter(s) IntraMuscular once  enoxaparin Injectable 40 milliGRAM(s) SubCutaneous <User Schedule>  fentaNYL    Injectable 12.5 MICROGram(s) IV Push every 2 hours PRN  glucagon  Injectable 1 milliGRAM(s) IntraMuscular once PRN  influenza   Vaccine 0.5 milliLiter(s) IntraMuscular once  insulin lispro (HumaLOG) corrective regimen sliding scale   SubCutaneous every 6 hours  insulin NPH human recombinant 4 Unit(s) SubCutaneous every 6 hours  norepinephrine Infusion 0.05 MICROgram(s)/kG/Min (3.8 mL/Hr) IV Continuous <Continuous>  ondansetron Injectable 4 milliGRAM(s) IV Push every 6 hours PRN  pantoprazole  Injectable 40 milliGRAM(s) IV Push daily  polyethylene glycol 3350 17 Gram(s) Oral two times a day  senna 2 Tablet(s) Oral at bedtime  sodium chloride 0.9% lock flush 10 milliLiter(s) IV Push every 1 hour PRN  sodium chloride 2% . 1000 milliLiter(s) (40 mL/Hr) IV Continuous <Continuous>  sodium chloride 3%  Inhalation 3 milliLiter(s) Inhalation every 6 hours      LABS:  Na: 134 (10-16 @ 00:06), 130 (10-15 @ 15:09), 134 (10-15 @ 10:54), 139 (10-15 @ 07:37), 137 (10-15 @ 02:58), 137 (10-14 @ 18:53)  K: 4.5 (10-16 @ 00:06), 5.3 (10-15 @ 15:09), 4.6 (10-15 @ 10:54), 4.1 (10-15 @ 07:37), 3.5 (10-15 @ 02:58), 3.5 (10-14 @ 18:53)  Cl: 104 (10-16 @ 00:06), 103 (10-15 @ 15:09), 105 (10-15 @ 10:54), 106 (10-15 @ 07:37), 101 (10-15 @ 02:58), 103 (10-14 @ 18:53)  CO2: 18 (10-16 @ 00:06), 15 (10-15 @ 15:09), 17 (10-15 @ 10:54), 12 (10-15 @ 07:37), 12 (10-15 @ 02:58), 19 (10-14 @ 18:53)  BUN: 10 (10-16 @ 00:06), 10 (10-15 @ 15:09), 9 (10-15 @ 10:54), 10 (10-15 @ 07:37), 10 (10-15 @ 02:58), 13 (10-14 @ 18:53)  Cr: 0.78 (10-16 @ 00:06), 0.66 (10-15 @ 15:09), 0.70 (10-15 @ 10:54), 0.66 (10-15 @ 07:37), 0.68 (10-15 @ 02:58), 1.07 (10-14 @ 18:53)  Glu: 171(10-16 @ 00:06), 160(10-15 @ 15:09), 127(10-15 @ 10:54), 203(10-15 @ 07:37), 252(10-15 @ 02:58), 122(10-14 @ 18:53)    Hgb: 10.4 (10-16 @ 00:06), 10.2 (10-15 @ 02:58), 10.4 (10-14 @ 18:53)  Hct: 32.1 (10-16 @ 00:06), 30.9 (10-15 @ 02:58), 32.5 (10-14 @ 18:53)  WBC: 10.25 (10-16 @ 00:06), 15.75 (10-15 @ 02:58), 7.28 (10-14 @ 18:53)  Plt: 326 (10-16 @ 00:06), 329 (10-15 @ 02:58), 324 (10-14 @ 18:53)    INR: 1.01 10-14-20 @ 18:53  PTT: 24.5 10-14-20 @ 18:53          LIVER FUNCTIONS - ( 14 Oct 2020 18:53 )  Alb: 4.3 g/dL / Pro: 7.1 g/dL / ALK PHOS: 56 U/L / ALT: 20 U/L / AST: 25 U/L / GGT: x           ABG - ( 15 Oct 2020 15:06 )  pH, Arterial: 7.34  pH, Blood: x     /  pCO2: 35    /  pO2: 68    / HCO3: 18    / Base Excess: -6.4  /  SaO2: 93                   SUMMARY:  54 year-old Cantonese-speaking man who fell >10 feet from ladder and was found unable to move arms or legs on 10/14/20. He was seen in the [facility] ED where he was a Level 1 Trauma. He was noted to have an PETR A cervical cord injury with MRI notable for C3-4 central disc herniation resulting in compression of the spinal cord associated with spinal cord edema from C3 through C4 as well as extensive prevertebral soft tissue swelling at C1 through C5 and C7-T3 with probable disruption of the anterior longitudinal ligament and interspinous ligamentous injury at C2-C6 for which he was taken for emergent C3-5 laminectomy with C2-6 fusion.    24 HOUR EVENTS:  siadh    VITALS/DATA/ORDERS: [x] Reviewed    EXAMINATION:  Gen: intubated  HEENT: collar, perrl  CV: s1, s2   Resp: CTAB  Abd: soft nt/nd  extrem: wwp, pulses +2 throughout  neuro: eo spont, fc with eyes/nods head x3 with choices, eomi, b/l delts 1-2/5, bi/tri 3/5, HG 1/5, LE flaccid and t5 sensory level        ICU Vital Signs Last 24 Hrs  T(C): 36.5 (15 Oct 2020 23:00), Max: 37.2 (15 Oct 2020 15:00)  T(F): 97.7 (15 Oct 2020 23:00), Max: 98.9 (15 Oct 2020 15:00)  HR: 73 (16 Oct 2020 05:37) (58 - 90)  BP: --  BP(mean): --  ABP: 151/84 (16 Oct 2020 04:45) (111/76 - 156/82)  ABP(mean): 111 (16 Oct 2020 04:45) (82 - 112)  RR: 20 (16 Oct 2020 04:45) (12 - 30)  SpO2: 100% (16 Oct 2020 05:37) (85% - 100%)      10-15-20 @ 07:01  -  10-16-20 @ 07:00  --------------------------------------------------------  IN: 2098.8 mL / OUT: 3000 mL / NET: -901.2 mL        Mode: CPAP with PS, FiO2: 40, PEEP: 5, PS: 10, MAP: 8, PIP: 15    BACItracin   Ointment 1 Application(s) Topical two times a day  chlorhexidine 0.12% Liquid 15 milliLiter(s) Oral Mucosa every 12 hours  chlorhexidine 4% Liquid 1 Application(s) Topical <User Schedule>  chlorhexidine 4% Liquid 1 Application(s) Topical <User Schedule>  dexMEDEtomidine Infusion 0.2 MICROgram(s)/kG/Hr (4.05 mL/Hr) IV Continuous <Continuous>  dextrose 5%. 1000 milliLiter(s) (50 mL/Hr) IV Continuous <Continuous>  dextrose 50% Injectable 12.5 Gram(s) IV Push once  dextrose 50% Injectable 25 Gram(s) IV Push once  diphtheria/tetanus/pertussis (acellular) Vaccine (ADAcel) 0.5 milliLiter(s) IntraMuscular once  enoxaparin Injectable 40 milliGRAM(s) SubCutaneous <User Schedule>  fentaNYL    Injectable 12.5 MICROGram(s) IV Push every 2 hours PRN  glucagon  Injectable 1 milliGRAM(s) IntraMuscular once PRN  influenza   Vaccine 0.5 milliLiter(s) IntraMuscular once  insulin lispro (HumaLOG) corrective regimen sliding scale   SubCutaneous every 6 hours  insulin NPH human recombinant 4 Unit(s) SubCutaneous every 6 hours  norepinephrine Infusion 0.05 MICROgram(s)/kG/Min (3.8 mL/Hr) IV Continuous <Continuous>  ondansetron Injectable 4 milliGRAM(s) IV Push every 6 hours PRN  pantoprazole  Injectable 40 milliGRAM(s) IV Push daily  polyethylene glycol 3350 17 Gram(s) Oral two times a day  senna 2 Tablet(s) Oral at bedtime  sodium chloride 0.9% lock flush 10 milliLiter(s) IV Push every 1 hour PRN  sodium chloride 2% . 1000 milliLiter(s) (40 mL/Hr) IV Continuous <Continuous>  sodium chloride 3%  Inhalation 3 milliLiter(s) Inhalation every 6 hours      LABS:  Na: 134 (10-16 @ 00:06), 130 (10-15 @ 15:09), 134 (10-15 @ 10:54), 139 (10-15 @ 07:37), 137 (10-15 @ 02:58), 137 (10-14 @ 18:53)  K: 4.5 (10-16 @ 00:06), 5.3 (10-15 @ 15:09), 4.6 (10-15 @ 10:54), 4.1 (10-15 @ 07:37), 3.5 (10-15 @ 02:58), 3.5 (10-14 @ 18:53)  Cl: 104 (10-16 @ 00:06), 103 (10-15 @ 15:09), 105 (10-15 @ 10:54), 106 (10-15 @ 07:37), 101 (10-15 @ 02:58), 103 (10-14 @ 18:53)  CO2: 18 (10-16 @ 00:06), 15 (10-15 @ 15:09), 17 (10-15 @ 10:54), 12 (10-15 @ 07:37), 12 (10-15 @ 02:58), 19 (10-14 @ 18:53)  BUN: 10 (10-16 @ 00:06), 10 (10-15 @ 15:09), 9 (10-15 @ 10:54), 10 (10-15 @ 07:37), 10 (10-15 @ 02:58), 13 (10-14 @ 18:53)  Cr: 0.78 (10-16 @ 00:06), 0.66 (10-15 @ 15:09), 0.70 (10-15 @ 10:54), 0.66 (10-15 @ 07:37), 0.68 (10-15 @ 02:58), 1.07 (10-14 @ 18:53)  Glu: 171(10-16 @ 00:06), 160(10-15 @ 15:09), 127(10-15 @ 10:54), 203(10-15 @ 07:37), 252(10-15 @ 02:58), 122(10-14 @ 18:53)    Hgb: 10.4 (10-16 @ 00:06), 10.2 (10-15 @ 02:58), 10.4 (10-14 @ 18:53)  Hct: 32.1 (10-16 @ 00:06), 30.9 (10-15 @ 02:58), 32.5 (10-14 @ 18:53)  WBC: 10.25 (10-16 @ 00:06), 15.75 (10-15 @ 02:58), 7.28 (10-14 @ 18:53)  Plt: 326 (10-16 @ 00:06), 329 (10-15 @ 02:58), 324 (10-14 @ 18:53)    INR: 1.01 10-14-20 @ 18:53  PTT: 24.5 10-14-20 @ 18:53          LIVER FUNCTIONS - ( 14 Oct 2020 18:53 )  Alb: 4.3 g/dL / Pro: 7.1 g/dL / ALK PHOS: 56 U/L / ALT: 20 U/L / AST: 25 U/L / GGT: x           ABG - ( 15 Oct 2020 15:06 )  pH, Arterial: 7.34  pH, Blood: x     /  pCO2: 35    /  pO2: 68    / HCO3: 18    / Base Excess: -6.4  /  SaO2: 93

## 2020-10-16 NOTE — PHYSICAL THERAPY INITIAL EVALUATION ADULT - RANGE OF MOTION, PT EVAL
GOAL: Pt will improve ROM 10 degrees in shoulders, elbows, wrists, fingers, hips, knee, ankles, to allow for improved functional mobility in 4 weeks.

## 2020-10-16 NOTE — DIETITIAN INITIAL EVALUATION ADULT. - ORAL INTAKE PTA/DIET HISTORY
Pt intubated; unable to participate in nutrition evaluation secondary to same. Baseline tolerance to chewing/swallowing unknown; baseline provision of energy/protein intake unclear. No known food allergies.

## 2020-10-16 NOTE — PHYSICAL THERAPY INITIAL EVALUATION ADULT - ACTIVE RANGE OF MOTION EXAMINATION, REHAB EVAL
B/l shoulders no movement, L elbow to 90 degrees, R elbow to 125 degrees, Wrist and fingers no movement. B/l LE's no movement.

## 2020-10-16 NOTE — DIETITIAN INITIAL EVALUATION ADULT. - OTHER INFO
Pt intubated with NGT in place. Prescribed Glucerna 1.2 at GOAL rate 60ml/hr x 24 hrs. Currently infusing at 40ml/hr at time of RD visit (initiated this AM). S/P multiple electrolytes infusion 10/15. No documented BM's recorded thus far. On bowel regimen as ordered. Currently prescribed Humalog and Humulin to promote optimal glycemic control. A1c: 6.3%.

## 2020-10-16 NOTE — PROGRESS NOTE ADULT - ASSESSMENT
Traumatic spinal cord injury; C3-4 central cord herniation status post decompression/fusion, post-operative day 2  - q1hr Neuro checks  - MAP >85mmHg for cord perfusion on pressor support  - Pain control  - Monitor drain output  - Wean vent as tolerated/extubate but may need tracheostomy given cervical injury  - Monitor Na  - Monitor FS off insulin gtt, adjust NPH as needed  - PT/OT/PMNR evaluation

## 2020-10-17 NOTE — PROGRESS NOTE ADULT - SUBJECTIVE AND OBJECTIVE BOX
HPI:  · HPI Objective Statement: 55yo M pmhx DM BIBEMS s/p fall from ladder >10 ft, was laying on floor about 1 hr, unable to move upper or lower extremities. Awake, protecting airway, answers questions appropriately. Denies anticoagulant use.   (15 Oct 2020 05:14)    OVERNIGHT EVENTS:  Vital Signs Last 24 Hrs  T(C): 37.5 (17 Oct 2020 07:00), Max: 37.5 (17 Oct 2020 07:00)  T(F): 99.5 (17 Oct 2020 07:00), Max: 99.5 (17 Oct 2020 07:00)  HR: 80 (17 Oct 2020 12:15) (59 - 96)  BP: --  BP(mean): --  RR: 19 (17 Oct 2020 12:15) (13 - 25)  SpO2: 100% (17 Oct 2020 12:15) (98% - 100%)    I&O's Detail    16 Oct 2020 07:01  -  17 Oct 2020 07:00  --------------------------------------------------------  IN:    Dexmedetomidine: 98.3 mL    Enteral Tube Flush: 180 mL    Glucerna: 1200 mL    IV PiggyBack: 350 mL    Norepinephrine: 233.1 mL    sodium chloride 2%: 80 mL  Total IN: 2141.4 mL    OUT:    Drain (mL): 0 mL    Intermittent Catheterization - Urethral (mL): 1651 mL  Total OUT: 1651 mL    Total NET: 490.4 mL      17 Oct 2020 07:01  -  17 Oct 2020 12:29  --------------------------------------------------------  IN:    Dexmedetomidine: 20.5 mL    Glucerna: 300 mL    Norepinephrine: 37.5 mL  Total IN: 358 mL    OUT:  Total OUT: 0 mL    Total NET: 358 mL        I&O's Summary    16 Oct 2020 07:01  -  17 Oct 2020 07:00  --------------------------------------------------------  IN: 2141.4 mL / OUT: 1651 mL / NET: 490.4 mL    17 Oct 2020 07:01  -  17 Oct 2020 12:29  --------------------------------------------------------  IN: 358 mL / OUT: 0 mL / NET: 358 mL        PHYSICAL EXAM:  Neurological:    Motor exam:         [] Upper extremity                 D             B          T          WE       WF      HI                                               R         2/5        2/5        2/5       0/5     0/5       0/5                                               L          2/5        2/5        2/5       0/5     0/5       0/5         [x] Lower extremity           0/5             [] warm well perfused; capillary refill <3 seconds     Sensation: [] intact to light touch  [x] decreased: Below T4    Gait NT    Cardiovascular:  Respiratory:  Gastrointestinal:  Genitourinary:  Extremities:  Incision/Wound: Clean and dry    TUBES/LINES:  [] CVC  [] A-line  [] Lumbar Drain  [] Ventriculostomy  [] Other    DIET:  [] NPO  [] Mechanical  [] Tube feeds    LABS:                        9.5    8.87  )-----------( 262      ( 16 Oct 2020 22:02 )             28.9     10-16    136  |  107  |  17  ----------------------------<  225<H>  4.4   |  16<L>  |  0.64    Ca    9.3      16 Oct 2020 22:02  Phos  2.1     10-16  Mg     2.0     10-16              CAPILLARY BLOOD GLUCOSE      POCT Blood Glucose.: 213 mg/dL (17 Oct 2020 05:04)  POCT Blood Glucose.: 199 mg/dL (17 Oct 2020 00:16)  POCT Blood Glucose.: 214 mg/dL (16 Oct 2020 17:37)          Drug Levels: [] N/A    CSF Analysis: [] N/A      Allergies    No Known Allergies    Intolerances      MEDICATIONS:  Antibiotics:    Neuro:  dexMEDEtomidine Infusion 0.2 MICROgram(s)/kG/Hr IV Continuous <Continuous>  fentaNYL    Injectable 12.5 MICROGram(s) IV Push every 2 hours PRN  ondansetron Injectable 4 milliGRAM(s) IV Push every 6 hours PRN    Anticoagulation:  enoxaparin Injectable 40 milliGRAM(s) SubCutaneous <User Schedule>    OTHER:  BACItracin   Ointment 1 Application(s) Topical two times a day  chlorhexidine 0.12% Liquid 15 milliLiter(s) Oral Mucosa every 12 hours  chlorhexidine 4% Liquid 1 Application(s) Topical <User Schedule>  chlorhexidine 4% Liquid 1 Application(s) Topical <User Schedule>  dextrose 50% Injectable 12.5 Gram(s) IV Push once  dextrose 50% Injectable 25 Gram(s) IV Push once  diphtheria/tetanus/pertussis (acellular) Vaccine (ADAcel) 0.5 milliLiter(s) IntraMuscular once  glucagon  Injectable 1 milliGRAM(s) IntraMuscular once PRN  influenza   Vaccine 0.5 milliLiter(s) IntraMuscular once  insulin lispro (HumaLOG) corrective regimen sliding scale   SubCutaneous every 6 hours  insulin NPH human recombinant 10 Unit(s) SubCutaneous every 6 hours  norepinephrine Infusion 0.05 MICROgram(s)/kG/Min IV Continuous <Continuous>  pantoprazole  Injectable 40 milliGRAM(s) IV Push daily  polyethylene glycol 3350 17 Gram(s) Oral two times a day  senna 2 Tablet(s) Oral at bedtime  sodium chloride 3%  Inhalation 3 milliLiter(s) Inhalation every 6 hours    IVF:  dextrose 5%. 1000 milliLiter(s) IV Continuous <Continuous>    CULTURES:    RADIOLOGY & ADDITIONAL TESTS:      ASSESSMENT:  HPI:  · HPI Objective Statement: 55yo M pmhx DM BIBEMS s/p fall from ladder >10 ft, was laying on floor about 1 hr, unable to move upper or lower extremities. Awake, protecting airway, answers questions appropriately. Denies anticoagulant use.   (15 Oct 2020 05:14)    65y Male s/p    PLAN:  NEURO:  CARDIOVASCULAR:  PULMONARY:  RENAL:  GI:  HEME:  ID:  ENDO:    DVT PROPHYLAXIS:  [x] Venodynes                                [x] Heparin/Lovenox    FALL RISK:  [] Low Risk                                    [] Impulsive

## 2020-10-17 NOTE — PROGRESS NOTE ADULT - SUBJECTIVE AND OBJECTIVE BOX
Tolerating pressure support trials.     Intubated, eyes open to voice, EOMI, follows with eyes/face, nods appropriately, LUE deltoid 3/5 triceps 1/5 biceps 4/5 HG 1/5, RUE deltoid 2/5 biceps 3/5 triceps 1/5, HG 1/5, BLE flaccid 0/5, T5 sensory level Tolerating pressure support trials.     Intubated, eyes open to voice, EOMI, follows with eyes/face, nods appropriately, LUE deltoid 3/5 triceps 2/5 biceps 4-/5 HG 1/5, RUE deltoid 2/5 biceps 4-/5 triceps 2/5, HG 1/5, BLE flaccid 0/5, T5 sensory level    Reportedly had triceps 0/5 during the day but my exam is as above with b/l 2/5 triceps at SBP 118mmHg

## 2020-10-17 NOTE — CHART NOTE - NSCHARTNOTEFT_GEN_A_CORE
TERTIARY TRAUMA SURVEY  ------------------------------------------------------------------------------------------    Date/Time: 10-17-20 @ 11:42  Admit date:   Trauma activation:   Admit GCS:  	E-  	V-  	M-      HPI:  · HPI Objective Statement: 55yo M pmhx DM ANTONETTE s/p fall from ladder >10 ft, was laying on floor about 1 hr, unable to move upper or lower extremities. Awake, protecting airway, answers questions appropriately. Denies anticoagulant use.   (15 Oct 2020 05:14)      INTERVAL EVENTS: Taken to OR with NSX for emergent C3-5 laminectomy with C2-6 fusion. Has been monitored in NSICU since surgery.    PAST MEDICAL & SURGICAL HISTORY:  No pertinent past medical history    No significant past surgical history      [] No significant past history as reviewed with the patient and family    FAMILY HISTORY:    [] Family history not pertinent as reviewed with the patient and family    SOCIAL HISTORY:     ALLERGIES: No Known Allergies      HOME MEDICATIONS:   Home Medications:  amLODIPine 10 mg oral tablet: 1 tab(s) orally once a day (15 Oct 2020 15:46)  Flomax 0.4 mg oral capsule: 1 cap(s) orally once a day (at bedtime) (15 Oct 2020 15:46)  glimepiride 1 mg oral tablet: 3 milligram(s) orally once a day (15 Oct 2020 15:46)  metFORMIN 1000 mg oral tablet: 1 tab(s) orally 2 times a day (15 Oct 2020 15:46)  oxybutynin 10 mg/24 hr oral tablet, extended release: 1 tab(s) orally once a day (15 Oct 2020 15:46)  Zocor 10 mg oral tablet: 1 tab(s) orally once a day (at bedtime) (15 Oct 2020 15:46)      CURRENT MEDICATIONS:   MEDICATIONS (STANDING): dexMEDEtomidine Infusion 0.2 MICROgram(s)/kG/Hr IV Continuous <Continuous>  dextrose 5%. 1000 milliLiter(s) IV Continuous <Continuous>  dextrose 50% Injectable 12.5 Gram(s) IV Push once  dextrose 50% Injectable 25 Gram(s) IV Push once  diphtheria/tetanus/pertussis (acellular) Vaccine (ADAcel) 0.5 milliLiter(s) IntraMuscular once  enoxaparin Injectable 40 milliGRAM(s) SubCutaneous <User Schedule>  influenza   Vaccine 0.5 milliLiter(s) IntraMuscular once  insulin lispro (HumaLOG) corrective regimen sliding scale   SubCutaneous every 6 hours  insulin NPH human recombinant 10 Unit(s) SubCutaneous every 6 hours  norepinephrine Infusion 0.05 MICROgram(s)/kG/Min IV Continuous <Continuous>  pantoprazole  Injectable 40 milliGRAM(s) IV Push daily  polyethylene glycol 3350 17 Gram(s) Oral two times a day  senna 2 Tablet(s) Oral at bedtime  sodium chloride 3%  Inhalation 3 milliLiter(s) Inhalation every 6 hours    MEDICATIONS (PRN):fentaNYL    Injectable 12.5 MICROGram(s) IV Push every 2 hours PRN Moderate Pain (4 - 6)  glucagon  Injectable 1 milliGRAM(s) IntraMuscular once PRN Glucose LESS THAN 70 milligrams/deciliter  ondansetron Injectable 4 milliGRAM(s) IV Push every 6 hours PRN Nausea and/or Vomiting  sodium chloride 0.9% lock flush 10 milliLiter(s) IV Push every 1 hour PRN Pre/post blood products, medications, blood draw, and to maintain line patency    ------------------------------------------------------------------------------------------    VITAL SIGNS  T(C): 37.5 (10-17-20 @ 07:00), Max: 37.5 (10-17-20 @ 07:00)  HR: 78 (10-17-20 @ 11:00) (58 - 96)  BP: --  RR: 17 (10-17-20 @ 11:00) (13 - 25)  SpO2: 99% (10-17-20 @ 11:00) (98% - 100%)  CAPILLARY BLOOD GLUCOSE      POCT Blood Glucose.: 213 mg/dL (17 Oct 2020 05:04)  POCT Blood Glucose.: 199 mg/dL (17 Oct 2020 00:16)  POCT Blood Glucose.: 214 mg/dL (16 Oct 2020 17:37)  POCT Blood Glucose.: 169 mg/dL (16 Oct 2020 12:02)      INS/OUTS:    10-16 @ 07:01  -  10-17 @ 07:00  --------------------------------------------------------  IN:    Dexmedetomidine: 98.3 mL    Enteral Tube Flush: 180 mL    Glucerna: 1200 mL    IV PiggyBack: 350 mL    Norepinephrine: 233.1 mL    sodium chloride 2%: 80 mL  Total IN: 2141.4 mL    OUT:    Drain (mL): 0 mL    Intermittent Catheterization - Urethral (mL): 1651 mL  Total OUT: 1651 mL    Total NET: 490.4 mL      10-17 @ 07:01  -  10-17 @ 11:42  --------------------------------------------------------  IN:    Dexmedetomidine: 8.2 mL    Glucerna: 240 mL    Norepinephrine: 17.4 mL  Total IN: 265.6 mL    OUT:  Total OUT: 0 mL    Total NET: 265.6 mL        Drug Dosing Weight  Height (cm): 170.2 (16 Oct 2020 16:15)  Weight (kg): 81 (14 Oct 2020 20:25)  BMI (kg/m2): 28 (16 Oct 2020 16:15)  BSA (m2): 1.93 (16 Oct 2020 16:15)    PHYSICAL EXAM:    General: NAD, Intubated, mild sedation on precedex, does not follow commands  HEENT: NC/AT, EOMI  Neck: Soft, supple  Cardio: RRR,   Resp: Good effort, CTA b/l  Thorax: No chest wall tenderness  Breast: No lesions/masses, no drainage  GI/Abd: Soft, NT/ND, no rebound/guarding, no masses palpated  Vascular: Extremities warm, brisk cap refill, B/l radial pulses palpable, b/l DP/PT palpable, no palpable abdominal pulsatile mass  Skin: Intact, no breakdown  Lymphatic/Nodes: No palpable lymphadenopathy  Musculoskeletal: No movement of 4 extremities noted, right knee abrasion  ------------------------------------------------------------------------------------------    LABS  CBC (10-16 @ 22:02)                              9.5<L>                         8.87    )----------------(  262        --    % Neutrophils, --    % Lymphocytes, ANC: --                                  28.9<L>  CBC (10-16 @ 00:06)                              10.4<L>                         10.25   )----------------(  326        --    % Neutrophils, --    % Lymphocytes, ANC: --                                  32.1<L>    BMP (10-16 @ 22:02)             136     |  107     |  17    		Ca++ --      Ca 9.3                ---------------------------------( 225<H>		Mg 2.0                4.4     |  16<L>   |  0.64  			Ph 2.1<L>  BMP (10-16 @ 16:27)             134<L>  |  104     |  15    		Ca++ --      Ca 8.9                ---------------------------------( 243<H>		Mg --                 4.8     |  14<L>   |  0.67  			Ph --              ABG (10-16 @ 08:45)     7.35 / 28<L> / 118<H> / 15<L> / -9.2<L> / 99<H>%     Lactate:          MICROBIOLOGY      ------------------------------------------------------------------------------------------    RADIOLOGICAL FINDINGS REVIEW:      MRI C spine: Cervical spine MRI: C3-4 central disc herniation results in compression of the spinal cord with associated spinal cord edema from C3 through C4. Extensive prevertebral soft tissue swelling at C1 through C5 and at C7-T3 with probable disruption of the anterior longitudinal ligament. Interspinous ligamentous injury at C2-C6.    Thoracic spine MRI: No evidence for spinal cord compression.    Duplex: No evidence of deep venous thrombosis in either lower extremity.    CXR: No active pulmonary disease.  CXR: Endotracheal tube with the margarito. Enteric tube within the stomach. Left subclavian central line within SVC    Bilateral pleural effusions. No pneumothorax.    AXR: Distended stomach consistent with gastric outlet obstruction versus stomach atony  XRAY right knee: JOINTS    Joint Space(s):  Maintained.    Effusion:  Borderline small joint effusion is suggested.    IMPRESSION:  1. No acute osseous abnormalities.    Pelvis Films:   C-Spine Films:   T/L/S Spine Films:   Extremity Films:   Head CT: Head CT: No evidence for intracranial hemorrhage, mass effect, or displaced calvarial fracture. Laceration of the right frontal calvarium without associated calvarial fracture.    Maxillofacial CT: No acute facial bone fracture.    Cervical spine CT: No evidence for acute displaced fracture or traumatic malalignment.  Prominent prevertebral soft tissues. Ligamentous injury is not excluded. Follow-up cervical spine MRI is recommended for further evaluation.(already in progress)    Thoracolumbar spine: No evidence for acute displaced fracture or traumatic malalignment.    C-Spine CT:   Neck CT:   Chest CT: No traumatic sequela.    ABD/Pelvis CT:   Other:     List Injuries Identified to Date:    3-4 central disc herniation results in compression of the spinal cord with associated spinal cord edema from C3 through C4. Extensive prevertebral soft tissue swelling at C1 through C5 and at C7-T3 with probable disruption of the anterior longitudinal ligament. Interspinous ligamentous injury at C2-C6.        List Operative and Interventional Radiological Procedures:  C3-5 laminectomy with C2-6 fusion.       Consults (Date):    [x] Neurosurgery   [x] Orthopedic Surgery  [x] Spine Surgery  [] Plastic Surgery  [] ENT  [] Urology  [] PM&R  [] Social Work    INTERPRETATION:     54 year old male was found down by neighbors (unknown duration) and brought in by EMS, reportedly was on a ladder and fell 8 feet found to be quadriplegic with C3-4 disc herniation and compression of spinal cord with cord edema and prevertebral soft tissue swelling at CT 1 -5, now s/p C3-C5 laminectomy, C2-C6 instrumentation and fusion    PLAN:      - no additional injuries noted on tertiary exam, albeit pt is intubated and mildly sedated  - No acute surgical intervention from Trauma surgery  - No bony injury identified on CT Head/C-spine/C/A/P  - Care per NSICU    [facility] [id number] TERTIARY TRAUMA SURVEY  ------------------------------------------------------------------------------------------    Date/Time: 10-17-20 @ 11:42  Admit date:   Trauma activation:   Admit GCS:  	E-  	V-  	M-      HPI:  · HPI Objective Statement: 55yo M pmhx DM ANTONETTE s/p fall from ladder >10 ft, was laying on floor about 1 hr, unable to move upper or lower extremities. Awake, protecting airway, answers questions appropriately. Denies anticoagulant use.   (15 Oct 2020 05:14)      INTERVAL EVENTS: Taken to OR with NSX for emergent C3-5 laminectomy with C2-6 fusion. Has been monitored in NSICU since surgery.    PAST MEDICAL & SURGICAL HISTORY:  No pertinent past medical history    No significant past surgical history      [] No significant past history as reviewed with the patient and family    FAMILY HISTORY:    [] Family history not pertinent as reviewed with the patient and family    SOCIAL HISTORY:     ALLERGIES: No Known Allergies      HOME MEDICATIONS:   Home Medications:  amLODIPine 10 mg oral tablet: 1 tab(s) orally once a day (15 Oct 2020 15:46)  Flomax 0.4 mg oral capsule: 1 cap(s) orally once a day (at bedtime) (15 Oct 2020 15:46)  glimepiride 1 mg oral tablet: 3 milligram(s) orally once a day (15 Oct 2020 15:46)  metFORMIN 1000 mg oral tablet: 1 tab(s) orally 2 times a day (15 Oct 2020 15:46)  oxybutynin 10 mg/24 hr oral tablet, extended release: 1 tab(s) orally once a day (15 Oct 2020 15:46)  Zocor 10 mg oral tablet: 1 tab(s) orally once a day (at bedtime) (15 Oct 2020 15:46)      CURRENT MEDICATIONS:   MEDICATIONS (STANDING): dexMEDEtomidine Infusion 0.2 MICROgram(s)/kG/Hr IV Continuous <Continuous>  dextrose 5%. 1000 milliLiter(s) IV Continuous <Continuous>  dextrose 50% Injectable 12.5 Gram(s) IV Push once  dextrose 50% Injectable 25 Gram(s) IV Push once  diphtheria/tetanus/pertussis (acellular) Vaccine (ADAcel) 0.5 milliLiter(s) IntraMuscular once  enoxaparin Injectable 40 milliGRAM(s) SubCutaneous <User Schedule>  influenza   Vaccine 0.5 milliLiter(s) IntraMuscular once  insulin lispro (HumaLOG) corrective regimen sliding scale   SubCutaneous every 6 hours  insulin NPH human recombinant 10 Unit(s) SubCutaneous every 6 hours  norepinephrine Infusion 0.05 MICROgram(s)/kG/Min IV Continuous <Continuous>  pantoprazole  Injectable 40 milliGRAM(s) IV Push daily  polyethylene glycol 3350 17 Gram(s) Oral two times a day  senna 2 Tablet(s) Oral at bedtime  sodium chloride 3%  Inhalation 3 milliLiter(s) Inhalation every 6 hours    MEDICATIONS (PRN):fentaNYL    Injectable 12.5 MICROGram(s) IV Push every 2 hours PRN Moderate Pain (4 - 6)  glucagon  Injectable 1 milliGRAM(s) IntraMuscular once PRN Glucose LESS THAN 70 milligrams/deciliter  ondansetron Injectable 4 milliGRAM(s) IV Push every 6 hours PRN Nausea and/or Vomiting  sodium chloride 0.9% lock flush 10 milliLiter(s) IV Push every 1 hour PRN Pre/post blood products, medications, blood draw, and to maintain line patency    ------------------------------------------------------------------------------------------    VITAL SIGNS  T(C): 37.5 (10-17-20 @ 07:00), Max: 37.5 (10-17-20 @ 07:00)  HR: 78 (10-17-20 @ 11:00) (58 - 96)  BP: --  RR: 17 (10-17-20 @ 11:00) (13 - 25)  SpO2: 99% (10-17-20 @ 11:00) (98% - 100%)  CAPILLARY BLOOD GLUCOSE      POCT Blood Glucose.: 213 mg/dL (17 Oct 2020 05:04)  POCT Blood Glucose.: 199 mg/dL (17 Oct 2020 00:16)  POCT Blood Glucose.: 214 mg/dL (16 Oct 2020 17:37)  POCT Blood Glucose.: 169 mg/dL (16 Oct 2020 12:02)      INS/OUTS:    10-16 @ 07:01  -  10-17 @ 07:00  --------------------------------------------------------  IN:    Dexmedetomidine: 98.3 mL    Enteral Tube Flush: 180 mL    Glucerna: 1200 mL    IV PiggyBack: 350 mL    Norepinephrine: 233.1 mL    sodium chloride 2%: 80 mL  Total IN: 2141.4 mL    OUT:    Drain (mL): 0 mL    Intermittent Catheterization - Urethral (mL): 1651 mL  Total OUT: 1651 mL    Total NET: 490.4 mL      10-17 @ 07:01  -  10-17 @ 11:42  --------------------------------------------------------  IN:    Dexmedetomidine: 8.2 mL    Glucerna: 240 mL    Norepinephrine: 17.4 mL  Total IN: 265.6 mL    OUT:  Total OUT: 0 mL    Total NET: 265.6 mL        Drug Dosing Weight  Height (cm): 170.2 (16 Oct 2020 16:15)  Weight (kg): 81 (14 Oct 2020 20:25)  BMI (kg/m2): 28 (16 Oct 2020 16:15)  BSA (m2): 1.93 (16 Oct 2020 16:15)    PHYSICAL EXAM:    General: NAD, Intubated, mild sedation on precedex, does not follow commands  HEENT: NC/AT, EOMI  Neck: Soft, supple  Cardio: RRR,   Resp: Good effort, CTA b/l  Thorax: No chest wall tenderness  Breast: No lesions/masses, no drainage  GI/Abd: Soft, NT/ND, no rebound/guarding, no masses palpated  Vascular: Extremities warm, brisk cap refill, B/l radial pulses palpable, b/l DP palpable, no palpable abdominal pulsatile mass  Skin: Intact, no breakdown  Lymphatic/Nodes: No palpable lymphadenopathy  Musculoskeletal: Right hand 3/5 strength, left hand 3-4/5 (against minimal resistance) ,motor 0/5 b/l LE,  right knee abrasion, b/l sensation intact of UE,   ------------------------------------------------------------------------------------------    LABS  CBC (10-16 @ 22:02)                              9.5<L>                         8.87    )----------------(  262        --    % Neutrophils, --    % Lymphocytes, ANC: --                                  28.9<L>  CBC (10-16 @ 00:06)                              10.4<L>                         10.25   )----------------(  326        --    % Neutrophils, --    % Lymphocytes, ANC: --                                  32.1<L>    BMP (10-16 @ 22:02)             136     |  107     |  17    		Ca++ --      Ca 9.3                ---------------------------------( 225<H>		Mg 2.0                4.4     |  16<L>   |  0.64  			Ph 2.1<L>  BMP (10-16 @ 16:27)             134<L>  |  104     |  15    		Ca++ --      Ca 8.9                ---------------------------------( 243<H>		Mg --                 4.8     |  14<L>   |  0.67  			Ph --              ABG (10-16 @ 08:45)     7.35 / 28<L> / 118<H> / 15<L> / -9.2<L> / 99<H>%     Lactate:          MICROBIOLOGY      ------------------------------------------------------------------------------------------    RADIOLOGICAL FINDINGS REVIEW:      MRI C spine: Cervical spine MRI: C3-4 central disc herniation results in compression of the spinal cord with associated spinal cord edema from C3 through C4. Extensive prevertebral soft tissue swelling at C1 through C5 and at C7-T3 with probable disruption of the anterior longitudinal ligament. Interspinous ligamentous injury at C2-C6.    Thoracic spine MRI: No evidence for spinal cord compression.    Duplex: No evidence of deep venous thrombosis in either lower extremity.    CXR: No active pulmonary disease.  CXR: Endotracheal tube with the margarito. Enteric tube within the stomach. Left subclavian central line within SVC    Bilateral pleural effusions. No pneumothorax.    AXR: Distended stomach consistent with gastric outlet obstruction versus stomach atony  XRAY right knee: JOINTS    Joint Space(s):  Maintained.    Effusion:  Borderline small joint effusion is suggested.    IMPRESSION:  1. No acute osseous abnormalities.    Pelvis Films:   C-Spine Films:   T/L/S Spine Films:   Extremity Films:   Head CT: Head CT: No evidence for intracranial hemorrhage, mass effect, or displaced calvarial fracture. Laceration of the right frontal calvarium without associated calvarial fracture.    Maxillofacial CT: No acute facial bone fracture.    Cervical spine CT: No evidence for acute displaced fracture or traumatic malalignment.  Prominent prevertebral soft tissues. Ligamentous injury is not excluded. Follow-up cervical spine MRI is recommended for further evaluation.(already in progress)    Thoracolumbar spine: No evidence for acute displaced fracture or traumatic malalignment.    C-Spine CT:   Neck CT:   Chest CT: No traumatic sequela.    ABD/Pelvis CT:   Other:     List Injuries Identified to Date:    3-4 central disc herniation results in compression of the spinal cord with associated spinal cord edema from C3 through C4. Extensive prevertebral soft tissue swelling at C1 through C5 and at C7-T3 with probable disruption of the anterior longitudinal ligament. Interspinous ligamentous injury at C2-C6.        List Operative and Interventional Radiological Procedures:  C3-5 laminectomy with C2-6 fusion.       Consults (Date):    [x] Neurosurgery   [x] Orthopedic Surgery  [x] Spine Surgery  [] Plastic Surgery  [] ENT  [] Urology  [] PM&R  [] Social Work    INTERPRETATION:     54 year old male was found down by neighbors (unknown duration) and brought in by EMS, reportedly was on a ladder and fell 8 feet found to be quadriplegic with C3-4 disc herniation and compression of spinal cord with cord edema and prevertebral soft tissue swelling at CT 1 -5, now s/p C3-C5 laminectomy, C2-C6 instrumentation and fusion    PLAN:      - no additional injuries noted on tertiary exam, albeit pt is intubated and mildly sedated  - No acute surgical intervention from Trauma surgery  - No bony injury identified on CT Head/C-spine/C/A/P  - Care per NSICU    [facility] [id number]

## 2020-10-17 NOTE — PROGRESS NOTE ADULT - ASSESSMENT
ASSESSMENT/PLAN: Traumatic spinal cord injury/PETR A cervical cord injury status post C3-5 laminectomy with C2-6 fusion, post-operative day 2    NEURO:  Pain control  Monitor drain output  spinal precautions  Sedation: precedex, fent prn  Activity: [] mobilize as tolerated [] Bedrest [x] PT [x] OT - splint B/L UE and LE [x] PMNR eval for SCI facility    PULM:  Pressure support as tolerated  May need tracheostomy given high cervical injury  Aspiration precautions  VAP bundle    CV:  MAP >85 mmHg for cord perfusion  levo gtt  tte- stable lvef    RENAL:  Fluids: siadh, resolved, ivl  monitor electrolytes    GI:  Diet: tube feeds  GI prophylaxis [] not indicated [x] PPI: intubated [] other:  Bowel regimen, reglan prn     ENDO:    DM, out of dka, off insulin gtt  nph 4q6  Goal euglycemia (-180)  a1c 6.3    HEME/ONC:  Screen for VTE  High risk for VTE - may need IVCF  VTE prophylaxis: [x] SCDs  [x] high risk of DVT/PE on admission due to: trauma    ID:  Gabriella-op antibiotics     SOCIAL/FAMILY:  [x] awaiting     CODE STATUS:  [x] Full Code     DISPOSITION:  [x] ICU    [x] Patient is at high risk of neurologic deterioration/death due to: cord compression, acute respiratory failure    MISC: L subclavian (10/15)   ASSESSMENT/PLAN: Traumatic spinal cord injury/PETR A cervical cord injury status post C3-5 laminectomy with C2-6 fusion, post-operative day 2    NEURO:  Pain control  Monitor drain output  spinal precautions  Sedation: precedex, fent prn  Activity: [] mobilize as tolerated [] Bedrest [x] PT [x] OT - splint B/L UE and LE [x] PMNR eval for SCI facility    PULM:  Pressure support as tolerated  May need tracheostomy given high cervical injury--d/w patient and family  Aspiration precautions  VAP bundle    CV:  MAP >85 mmHg for cord perfusion  levo gtt  tte- stable lvef    RENAL:  Fluids: siadh, resolved, ivl  monitor electrolytes    GI:  Diet: tube feeds  GI prophylaxis [x] not indicated [] PPI:  [] other:  Bowel regimen, reglan prn     ENDO:    NPH/MAHESH  Goal euglycemia (-180)  a1c 6.3    HEME/ONC:  Screen for VTE  High risk for VTE - may need IVCF  VTE prophylaxis: [x] SCDs  [x] high risk of DVT/PE on admission due to: trauma    ID:  Gabriella-op antibiotics     SOCIAL/FAMILY:  [x] awaiting     CODE STATUS:  [x] Full Code     DISPOSITION:  [x] ICU    [x] Patient is at high risk of neurologic deterioration/death due to: cord compression, acute respiratory failure    MISC: L subclavian (10/15)

## 2020-10-17 NOTE — PROGRESS NOTE ADULT - SUBJECTIVE AND OBJECTIVE BOX
SUMMARY:  54 year-old Cantonese-speaking man who fell >10 feet from ladder and was found unable to move arms or legs on 10/14/20. He was seen in the [facility] ED where he was a Level 1 Trauma. He was noted to have an PETR A cervical cord injury with MRI notable for C3-4 central disc herniation resulting in compression of the spinal cord associated with spinal cord edema from C3 through C4 as well as extensive prevertebral soft tissue swelling at C1 through C5 and C7-T3 with probable disruption of the anterior longitudinal ligament and interspinous ligamentous injury at C2-C6 for which he was taken for emergent C3-5 laminectomy with C2-6 fusion.    24 HOUR EVENTS:  no acute events            REVIEW OF SYSTEMS: [x ] Unable to Assess due to neurologic exam        VITALS/DATA/ORDERS: [x] Reviewed    EXAMINATION:  Gen: intubated  HEENT: collar, perrl  CV: s1, s2   Resp: CTAB  Abd: soft nt/nd  extrem: wwp, pulses +2 throughout  neuro: eo spont, fc with eyes/nods head x3 with choices, eomi, b/l delts 1-2/5, bi/tri 3/5, HG 1/5, LE flaccid and t5 sensory level           SUMMARY:  54 year-old Cantonese-speaking man who fell >10 feet from ladder and was found unable to move arms or legs on 10/14/20. He was seen in the [facility] ED where he was a Level 1 Trauma. He was noted to have an PETR A cervical cord injury with MRI notable for C3-4 central disc herniation resulting in compression of the spinal cord associated with spinal cord edema from C3 through C4 as well as extensive prevertebral soft tissue swelling at C1 through C5 and C7-T3 with probable disruption of the anterior longitudinal ligament and interspinous ligamentous injury at C2-C6 for which he was taken for emergent C3-5 laminectomy with C2-6 fusion.    24 HOUR EVENTS:  no acute events      Unable       REVIEW OF SYSTEMS: [x ] Unable to Assess due to neurologic exam        VITALS/DATA/ORDERS: [x] Reviewed    EXAMINATION:  Gen: intubated  HEENT: collar, perrl  CV: s1, s2   Resp: CTAB  Abd: soft nt/nd  extrem: wwp, pulses +2 throughout  neuro: eo spont, fc with eyes/nods head x3 with choices, eomi, BUE delt 1-2/5, bi 4-/5 tri 0/5, HG 05, LE flaccid and t5 sensory level           SUMMARY:  54 year-old Cantonese-speaking man who fell >10 feet from ladder and was found unable to move arms or legs on 10/14/20. He was seen in the [facility] ED where he was a Level 1 Trauma. He was noted to have an PETR A cervical cord injury with MRI notable for C3-4 central disc herniation resulting in compression of the spinal cord associated with spinal cord edema from C3 through C4 as well as extensive prevertebral soft tissue swelling at C1 through C5 and C7-T3 with probable disruption of the anterior longitudinal ligament and interspinous ligamentous injury at C2-C6 for which he was taken for emergent C3-5 laminectomy with C2-6 fusion.    24 HOUR EVENTS:  no acute events    Unable to assess, intubated    REVIEW OF SYSTEMS: [x ] Unable to Assess due to neurologic exam        VITALS/DATA/ORDERS: [x] Reviewed    EXAMINATION:  Gen: intubated  HEENT: collar, perrl  CV: s1, s2   Resp: CTAB  Abd: soft nt/nd  extrem: wwp, pulses +2 throughout  neuro: eo spont, fc with eyes/nods head x3 with choices, eomi, BUE delt 1-2/5, bi 4-/5 tri 0/5, HG 05, LE flaccid and t5 sensory level

## 2020-10-18 NOTE — PROCEDURE NOTE - NSPROCDETAILS_GEN_ALL_CORE
positive blood return obtained via catheter/Seldinger technique/all materials/supplies accounted for at end of procedure/ultrasound guidance/location identified, draped/prepped, sterile technique used, needle inserted/introduced/connected to a pressurized flush line/hemostasis with direct pressure, dressing applied/sutured in place
lumen(s) aspirated and flushed/ultrasound guidance/guidewire recovered/sterile dressing applied/sterile technique, catheter placed

## 2020-10-18 NOTE — PROGRESS NOTE ADULT - SUBJECTIVE AND OBJECTIVE BOX
Extubated but developed increased work of breathing and hypercarbic respiratory failure, so was re-intubated. Extubated but developed increased work of breathing and hypercarbic respiratory failure, so was re-intubated.     Awake, alert, fully oriented, follows, RUE HG 0, biceps 2/5 triceps 2/5 shoulder shrug 3/5, LUE shoulder shrug 3/5 biceps 4-/5, triceps 1-2/5, HG 1/5, BLE 0/5

## 2020-10-18 NOTE — AIRWAY PLACEMENT NOTE ADULT - POST AIRWAY PLACEMENT ASSESSMENT:
breath sounds bilateral/positive end tidal CO2 noted
breath sounds equal/positive end tidal CO2 noted/CXR pending/chest excursion noted/breath sounds bilateral

## 2020-10-18 NOTE — AIRWAY PLACEMENT NOTE ADULT - INDICATIONS:
Schedule a visit with a therapist at the [address] [place] [address]  Let me know if you have trouble getting set up with someone there  Start flonase and allegra as needed for allergy symptoms 
Route for mechanical ventilation
Route for mechanical ventilation/Respiratory distress

## 2020-10-18 NOTE — PROGRESS NOTE ADULT - SUBJECTIVE AND OBJECTIVE BOX
Vital Signs Last 24 Hrs  T(C): 38 (17 Oct 2020 23:00), Max: 38.4 (17 Oct 2020 20:00)  T(F): 100.4 (17 Oct 2020 23:00), Max: 101.1 (17 Oct 2020 20:00)  HR: 79 (18 Oct 2020 01:30) (71 - 94)  BP: 151/95 (18 Oct 2020 01:30) (126/76 - 151/95)  BP(mean): 112 (18 Oct 2020 01:30) (87 - 112)  RR: 21 (18 Oct 2020 01:30) (16 - 22)  SpO2: 98% (18 Oct 2020 01:30) (90% - 100%)    EXAM  Intubated, EO to voice, EOMI, Ox3 nods appropriately, LUE delts 3/5, LUE  triceps 1-2/5, LUE biceps 4-/5, HG 0/5, RUE delts 2-3/5,  RUE biceps 3/5, RUE tri 1-2/5, HG 1/5, b/l  LE flaccid, T4 sensory level

## 2020-10-18 NOTE — PROGRESS NOTE ADULT - ASSESSMENT
Traumatic spinal cord injury; C3-4 central cord herniation status post decompression/fusion, post-operative day 4  - q1hr Neuro checks  - -140mmHg  - Pain control  - Wean vent as tolerated; however, appears to need tracheostomy - family meeting pending   - Monitor Na  - DMII: adjust NPH as needed  - PT/OT/PMNR Traumatic spinal cord injury; C3-4 central cord herniation status post decompression/fusion, post-operative day 4  - q1hr Neuro checks  - Transfuse 1unit PRBC  - -140mmHg; wean off pressor  - Pain control  - Wean vent as tolerated; however, appears to need tracheostomy given rapid hypercarbic resp failure after extubation today - family meeting pending   - Monitor Na  - DMII: adjust NPH as needed  - febrile, thick secretions - started on antibiotics today   - PT/OT/PMNR

## 2020-10-18 NOTE — PROGRESS NOTE ADULT - SUBJECTIVE AND OBJECTIVE BOX
Extubated at 11:40am. Patient taking shallow breaths and unable to cough, became progressively weaker.   ABG showing acute respiratory acidosis with hypercapnia.  Desaturation to 40's while trying on BiPAP.   Told patient at bedside that I think he's not breathing well and that he needs the endotracheal tube back in. Patient nodded his head.   Called his son at [phone] and informed him of the events. Son understands and will relay information to his mother and brother who are coming to visit.

## 2020-10-18 NOTE — PROGRESS NOTE ADULT - ASSESSMENT
ASSESSMENT/PLAN: Traumatic spinal cord injury/PETR A cervical cord injury status post C3-5 laminectomy with C2-6 fusion, post-operative day 3    NEURO:  Pain control  Monitor drain output  spinal precautions  Sedation: precedex, fent prn  Activity: [] mobilize as tolerated [] Bedrest [x] PT [x] OT - splint B/L UE and LE [x] PMNR eval for SCI facility    PULM:  Pressure support as tolerated  May need tracheostomy given high cervical injury--d/w patient and family  Aspiration precautions  VAP bundle    CV:  MAP >85 mmHg for cord perfusion  levo gtt  tte- stable lvef    RENAL:  Fluids: siadh, resolved, ivl  monitor electrolytes    GI:  Diet: tube feeds  GI prophylaxis [x] not indicated [] PPI:  [] other:  Bowel regimen, reglan prn     ENDO:    NPH/MAHESH  Goal euglycemia (-180)  a1c 6.3    HEME/ONC:  Screen for VTE  High risk for VTE - may need IVCF  VTE prophylaxis: [x] SCDs  [x] high risk of DVT/PE on admission due to: trauma    ID:  Gabriella-op antibiotics     SOCIAL/FAMILY:  [x] awaiting     CODE STATUS:  [x] Full Code     DISPOSITION:  [x] ICU    [x] Patient is at high risk of neurologic deterioration/death due to: cord compression, acute respiratory failure    MISC: L subclavian (10/15)   ASSESSMENT/PLAN: Traumatic spinal cord injury/PETR A cervical cord injury status post C3-5 laminectomy with C2-6 fusion, post-operative day 3    NEURO:  Pain control  Monitor drain output  spinal precautions  Sedation: precedex, fent prn  Activity: [] mobilize as tolerated [] Bedrest [x] PT [x] OT - splint B/L UE and LE [x] PMNR eval for SCI facility    PULM:  Pressure support as tolerated  May need tracheostomy given high cervical injury--d/w patient and family  Aspiration precautions  VAP bundle    CV:  MAP >85 mmHg for cord perfusion, likely to end today  levo gtt  tte- stable lvef    RENAL:  Fluids: siadh, resolved, ivl  monitor electrolytes  start cardura for BPH, on flomax at home    GI:  Diet: tube feeds  GI prophylaxis [x] not indicated [] PPI:  [] other:  Bowel regimen, reglan prn     ENDO:    NPH/MAHESH  Goal euglycemia (-180)  a1c 6.3    HEME/ONC:  Screen for VTE  High risk for VTE - may need IVCF  VTE prophylaxis: [x] SCDs  [x] high risk of DVT/PE on admission due to: trauma    ID:  Gabriella-op antibiotics     SOCIAL/FAMILY:  [x] awaiting     CODE STATUS:  [x] Full Code     DISPOSITION:  [x] ICU    [x] Patient is at high risk of neurologic deterioration/death due to: cord compression, acute respiratory failure    MISC: L subclavian (10/15)

## 2020-10-18 NOTE — AIRWAY REMOVAL NOTE  ADULT & PEDS - ARTIFICAL AIRWAY REMOVAL COMMENTS
Written order for extubation verified. The patient was identified by full name and birth date compared to the identification band. Present during the procedure was  Aleyda PIERSON

## 2020-10-18 NOTE — PROGRESS NOTE ADULT - SUBJECTIVE AND OBJECTIVE BOX
SUMMARY:  54 year-old Cantonese-speaking man who fell >10 feet from ladder and was found unable to move arms or legs on 10/14/20. He was seen in the [facility] ED where he was a Level 1 Trauma. He was noted to have an PETR A cervical cord injury with MRI notable for C3-4 central disc herniation resulting in compression of the spinal cord associated with spinal cord edema from C3 through C4 as well as extensive prevertebral soft tissue swelling at C1 through C5 and C7-T3 with probable disruption of the anterior longitudinal ligament and interspinous ligamentous injury at C2-C6 for which he was taken for emergent C3-5 laminectomy with C2-6 fusion.    24 HOUR EVENTS:  no acute events    Unable to assess, intubated    REVIEW OF SYSTEMS: [x ] Unable to Assess due to neurologic exam        VITALS/DATA/ORDERS: [x] Reviewed    EXAMINATION:  Gen: intubated  HEENT: collar, perrl  CV: s1, s2   Resp: CTAB  Abd: soft nt/nd  extrem: wwp, pulses +2 throughout  neuro: eo spont, fc with eyes/nods head x3 with choices, eomi, BUE delt 1-2/5, bi 4-/5 tri 0/5, HG 05, LE flaccid and t5 sensory level           SUMMARY:  54 year-old Cantonese-speaking man who fell >10 feet from ladder and was found unable to move arms or legs on 10/14/20. He was seen in the [facility] ED where he was a Level 1 Trauma. He was noted to have an PETR A cervical cord injury with MRI notable for C3-4 central disc herniation resulting in compression of the spinal cord associated with spinal cord edema from C3 through C4 as well as extensive prevertebral soft tissue swelling at C1 through C5 and C7-T3 with probable disruption of the anterior longitudinal ligament and interspinous ligamentous injury at C2-C6 for which he was taken for emergent C3-5 laminectomy with C2-6 fusion.    24 HOUR EVENTS:  remains on pressors    Unable to assess, intubated    REVIEW OF SYSTEMS: [x ] Unable to Assess due to neurologic exam        VITALS/DATA/ORDERS: [x] Reviewed    EXAMINATION:  Gen: intubated  HEENT: collar, perrl  CV: s1, s2   Resp: CTAB  Abd: soft nt/nd  extrem: wwp, pulses +2 throughout  neuro: eo spont, fc with eyes/nods head x3 with choices, eomi, BUE delt 1-2/5, bi 4-/5 tri 0/5, HG 05, LE flaccid and t5 sensory level

## 2020-10-18 NOTE — PROGRESS NOTE ADULT - ASSESSMENT
Hypercapneic and hypoxic respiratory failure    re-intubation  14/500/5/50%  CXR now  ABG in an hour  will need further GOC discussion with Patient and family

## 2020-10-19 NOTE — CONSULT NOTE ADULT - SUBJECTIVE AND OBJECTIVE BOX
CHIEF COMPLAINT:Patient is a 65y old  Male who presents with a chief complaint of     HISTORY OF PRESENT ILLNESS:    65 male with traumatic cervical injury s/p decompression / fusion   resp failure on vent has been having episodes of hypotension, bradycardia and cardiac arrest  ROS is limited as pt currently sedated on ventilator.      PAST MEDICAL & SURGICAL HISTORY:  No pertinent past medical history    No significant past surgical history            MEDICATIONS:  DOPamine Infusion 2 MICROgram(s)/kG/Min IV Continuous <Continuous>  doxazosin 2 milliGRAM(s) Oral at bedtime  enoxaparin Injectable 40 milliGRAM(s) SubCutaneous <User Schedule>  norepinephrine Infusion 0.05 MICROgram(s)/kG/Min IV Continuous <Continuous>    piperacillin/tazobactam IVPB.. 3.375 Gram(s) IV Intermittent every 8 hours    sodium chloride 3%  Inhalation 3 milliLiter(s) Inhalation every 6 hours    acetaminophen    Suspension .. 650 milliGRAM(s) Oral every 6 hours PRN  fentaNYL   Infusion... 0.5 MICROgram(s)/kG/Hr IV Continuous <Continuous>  ondansetron Injectable 4 milliGRAM(s) IV Push every 6 hours PRN  oxyCODONE    IR 5 milliGRAM(s) Oral every 4 hours PRN  oxyCODONE    IR 10 milliGRAM(s) Oral every 4 hours PRN    pantoprazole  Injectable 40 milliGRAM(s) IV Push daily  polyethylene glycol 3350 17 Gram(s) Oral two times a day  senna 2 Tablet(s) Oral at bedtime    dextrose 50% Injectable 12.5 Gram(s) IV Push once  dextrose 50% Injectable 25 Gram(s) IV Push once  glucagon  Injectable 1 milliGRAM(s) IntraMuscular once PRN  insulin lispro (HumaLOG) corrective regimen sliding scale   SubCutaneous every 6 hours  insulin NPH human recombinant 12 Unit(s) SubCutaneous every 6 hours    BACItracin   Ointment 1 Application(s) Topical two times a day  chlorhexidine 0.12% Liquid 15 milliLiter(s) Oral Mucosa every 12 hours  chlorhexidine 4% Liquid 1 Application(s) Topical <User Schedule>  chlorhexidine 4% Liquid 1 Application(s) Topical <User Schedule>  dextrose 5%. 1000 milliLiter(s) IV Continuous <Continuous>  diphtheria/tetanus/pertussis (acellular) Vaccine (ADAcel) 0.5 milliLiter(s) IntraMuscular once  influenza   Vaccine 0.5 milliLiter(s) IntraMuscular once  sodium chloride 0.9% lock flush 10 milliLiter(s) IV Push every 1 hour PRN      FAMILY HISTORY:      Non-contributory    SOCIAL HISTORY:    No tobacco, drugs or etoh    Allergies    No Known Allergies    Intolerances    	    REVIEW OF SYSTEMS:  as above  The rest of the 14 points ROS reviewed and except above they are unremarkable.        PHYSICAL EXAM:  T(C): 37.1 (10-19-20 @ 09:00), Max: 38.9 (10-18-20 @ 19:00)  HR: 66 (10-19-20 @ 10:00) (0 - 106)  BP: --  RR: 16 (10-19-20 @ 10:00) (11 - 26)  SpO2: 100% (10-19-20 @ 10:00) (63% - 100%)  Wt(kg): --  I&O's Summary    18 Oct 2020 07:01  -  19 Oct 2020 07:00  --------------------------------------------------------  IN: 2000.5 mL / OUT: 1250 mL / NET: 750.5 mL    19 Oct 2020 07:01  -  19 Oct 2020 11:03  --------------------------------------------------------  IN: 210.6 mL / OUT: 0 mL / NET: 210.6 mL        Appearance: on vent 	  Cardiovascular: Normal S1 S2,    Murmur:   Neck: JVP limited to be evaluated   Respiratory: Lungs few rhonchi   Gastrointestinal:  Soft  Skin: normal   Neuro: limited as pt on vent      LABS/DATA:    TELEMETRY: 	    ECG:  	Sinus, episode of sinus arrest, sinus bradycardia    	  CARDIAC MARKERS:                        14 <<== 10-18-20 @ 21:19                              7.8    10.62 )-----------( 219      ( 19 Oct 2020 05:21 )             23.8     10-19    142  |  109<H>  |  29<H>  ----------------------------<  261<H>  4.3   |  24  |  0.85    Ca    8.0<L>      19 Oct 2020 05:21  Phos  3.9     10-19  Mg     2.3     10-19    TPro  5.6<L>  /  Alb  2.6<L>  /  TBili  0.4  /  DBili  x   /  AST  66<H>  /  ALT  58<H>  /  AlkPhos  68  10-19    proBNP:   Lipid Profile:   HgA1c:   TSH:

## 2020-10-19 NOTE — PROGRESS NOTE ADULT - SUBJECTIVE AND OBJECTIVE BOX
NSCU ATTENDING -- ADDITIONAL PROGRESS NOTE    Nighttime rounds were performed -- please refer to earlier Progress Note for HPI details.    T(C): 37.6 (10-19-20 @ 19:00), Max: 38.3 (10-18-20 @ 22:00)  HR: 65 (10-19-20 @ 20:17) (0 - 91)  BP: --  RR: 16 (10-19-20 @ 19:45) (11 - 24)  SpO2: 98% (10-19-20 @ 20:17) (90% - 100%)  Wt(kg): --    Relevant labwork and imaging reviewed.    Remains critically ill.    Failed extubation x 1; DNR.  Family deciding re: trach/PEG.  On dopamine, fentanyl GTT.  Tube feeds increased from 60 to 75.  Maintain current vent settings based on last ABG.    30 minutes of critical care time.

## 2020-10-19 NOTE — PROGRESS NOTE ADULT - ASSESSMENT
Traumatic spinal cord injury; C3-4 central cord herniation status post decompression/fusion, post-operative day 4  - q1hr Neuro checks  - Transfuse 1unit PRBC  - -140mmHg; wean off pressor  - Pain control  - Wean vent as tolerated; however, appears to need tracheostomy given rapid hypercarbic resp failure after extubation today - family meeting pending   - Monitor Na  - DMII: adjust NPH as needed  - febrile, thick secretions - started on antibiotics today   - PT/OT/PMNR

## 2020-10-19 NOTE — CONSULT NOTE ADULT - ASSESSMENT
Asystole   Bradycardia  Hypotension     all likely due to cervical spine injury causing sympathetic dysfunction with unopposed high vagal tone   suggest   avoid any av sara blockers   avoid midodrine  start Dopamine for presser and low HR   atropine PRN   EP eval to consider TVP    anemia  Monitor hemoglobin, transfuse as needed.

## 2020-10-19 NOTE — PROGRESS NOTE ADULT - SUBJECTIVE AND OBJECTIVE BOX
SUMMARY:  54 year-old Cantonese-speaking man who fell >10 feet from ladder and was found unable to move arms or legs on 10/14/20. He was seen in the [facility] ED where he was a Level 1 Trauma. He was noted to have an PETR A cervical cord injury with MRI notable for C3-4 central disc herniation resulting in compression of the spinal cord associated with spinal cord edema from C3 through C4 as well as extensive prevertebral soft tissue swelling at C1 through C5 and C7-T3 with probable disruption of the anterior longitudinal ligament and interspinous ligamentous injury at C2-C6 for which he was taken for emergent C3-5 laminectomy with C2-6 fusion.    24 HOUR EVENTS:  remains on pressors    Unable to assess, intubated    REVIEW OF SYSTEMS: [x ] Unable to Assess due to neurologic exam        VITALS/DATA/ORDERS: [x] Reviewed    EXAMINATION:  Gen: intubated  HEENT: collar, perrl  CV: s1, s2   Resp: CTAB  Abd: soft nt/nd  extrem: wwp, pulses +2 throughout  neuro: eo spont, fc with eyes/nods head x3 with choices, eomi, BUE delt 1-2/5, bi 4-/5 tri 0/5, HG 05, LE flaccid and t5 sensory level            ICU Vital Signs Last 24 Hrs  T(C): 37.6 (19 Oct 2020 00:00), Max: 38.9 (18 Oct 2020 19:00)  T(F): 99.7 (19 Oct 2020 00:00), Max: 102 (18 Oct 2020 19:00)  HR: 70 (19 Oct 2020 05:22) (61 - 106)  BP: --  BP(mean): --  ABP: 129/63 (19 Oct 2020 02:00) (81/53 - 170/74)  ABP(mean): 90 (19 Oct 2020 02:00) (62 - 112)  RR: 17 (19 Oct 2020 02:00) (14 - 26)  SpO2: 97% (19 Oct 2020 05:22) (63% - 100%)      10-17-20 @ 07:01  -  10-18-20 @ 07:00  --------------------------------------------------------  IN: 2105.3 mL / OUT: 1875 mL / NET: 230.3 mL    10-18-20 @ 07:01  -  10-19-20 @ 06:10  --------------------------------------------------------  IN: 1247.7 mL / OUT: 700 mL / NET: 547.7 mL      Mode: AC/ CMV (Assist Control/ Continuous Mandatory Ventilation), RR (machine): 16, TV (machine): 500, FiO2: 30, PEEP: 5, ITime: 1, MAP: 8, PIP: 15  acetaminophen    Suspension .. 650 milliGRAM(s) Oral every 6 hours PRN  BACItracin   Ointment 1 Application(s) Topical two times a day  chlorhexidine 0.12% Liquid 15 milliLiter(s) Oral Mucosa every 12 hours  chlorhexidine 4% Liquid 1 Application(s) Topical <User Schedule>  chlorhexidine 4% Liquid 1 Application(s) Topical <User Schedule>  dexMEDEtomidine Infusion 0.2 MICROgram(s)/kG/Hr (4.05 mL/Hr) IV Continuous <Continuous>  dextrose 5%. 1000 milliLiter(s) (50 mL/Hr) IV Continuous <Continuous>  dextrose 50% Injectable 12.5 Gram(s) IV Push once  dextrose 50% Injectable 25 Gram(s) IV Push once  diphtheria/tetanus/pertussis (acellular) Vaccine (ADAcel) 0.5 milliLiter(s) IntraMuscular once  doxazosin 2 milliGRAM(s) Oral at bedtime  enoxaparin Injectable 40 milliGRAM(s) SubCutaneous <User Schedule>  glucagon  Injectable 1 milliGRAM(s) IntraMuscular once PRN  influenza   Vaccine 0.5 milliLiter(s) IntraMuscular once  insulin lispro (HumaLOG) corrective regimen sliding scale   SubCutaneous every 6 hours  insulin NPH human recombinant 12 Unit(s) SubCutaneous every 6 hours  norepinephrine Infusion 0.05 MICROgram(s)/kG/Min (3.8 mL/Hr) IV Continuous <Continuous>  ondansetron Injectable 4 milliGRAM(s) IV Push every 6 hours PRN  oxyCODONE    IR 5 milliGRAM(s) Oral every 4 hours PRN  oxyCODONE    IR 10 milliGRAM(s) Oral every 4 hours PRN  pantoprazole  Injectable 40 milliGRAM(s) IV Push daily  piperacillin/tazobactam IVPB.. 3.375 Gram(s) IV Intermittent every 8 hours  polyethylene glycol 3350 17 Gram(s) Oral two times a day  senna 2 Tablet(s) Oral at bedtime  sodium chloride 0.9% lock flush 10 milliLiter(s) IV Push every 1 hour PRN  sodium chloride 3%  Inhalation 3 milliLiter(s) Inhalation every 6 hours                            7.8    10.62 )-----------( 219      ( 19 Oct 2020 05:21 )             23.8     10-19    142  |  109<H>  |  29<H>  ----------------------------<  261<H>  4.3   |  24  |  0.85    Ca    8.0<L>      19 Oct 2020 05:21  Phos  3.9     10-19  Mg     2.3     10-19    TPro  5.6<L>  /  Alb  2.6<L>  /  TBili  0.4  /  DBili  x   /  AST  66<H>  /  ALT  58<H>  /  AlkPhos  68  10-19    LIVER FUNCTIONS - ( 19 Oct 2020 05:21 )  Alb: 2.6 g/dL / Pro: 5.6 g/dL / ALK PHOS: 68 U/L / ALT: 58 U/L / AST: 66 U/L / GGT: x           ABG - ( 19 Oct 2020 05:16 )  pH, Arterial: 7.40  pH, Blood: x     /  pCO2: 42    /  pO2: 78    / HCO3: 25    / Base Excess: .6    /  SaO2: 96                         SUMMARY:  54 year-old Cantonese-speaking man who fell >10 feet from ladder and was found unable to move arms or legs on 10/14/20. He was seen in the [facility] ED where he was a Level 1 Trauma. He was noted to have an PETR A cervical cord injury with MRI notable for C3-4 central disc herniation resulting in compression of the spinal cord associated with spinal cord edema from C3 through C4 as well as extensive prevertebral soft tissue swelling at C1 through C5 and C7-T3 with probable disruption of the anterior longitudinal ligament and interspinous ligamentous injury at C2-C6 for which he was taken for emergent C3-5 laminectomy with C2-6 fusion.    Overnight events: Bradycardic. Significant sinus pause of 45s which resolved prior to atropine (none given)  Extubated and reintubated due to hypoxia and hypercarbia      ICU Vital Signs Last 24 Hrs  T(C): 37.6 (19 Oct 2020 00:00), Max: 38.9 (18 Oct 2020 19:00)  T(F): 99.7 (19 Oct 2020 00:00), Max: 102 (18 Oct 2020 19:00)  HR: 70 (19 Oct 2020 05:22) (61 - 106)  ABP: 129/63 (19 Oct 2020 02:00) (81/53 - 170/74)  ABP(mean): 90 (19 Oct 2020 02:00) (62 - 112)  RR: 17 (19 Oct 2020 02:00) (14 - 26)  SpO2: 97% (19 Oct 2020 05:22) (63% - 100%)      10-17-20 @ 07:01  -  10-18-20 @ 07:00  --------------------------------------------------------  IN: 2105.3 mL / OUT: 1875 mL / NET: 230.3 mL    10-18-20 @ 07:01  -  10-19-20 @ 06:10  --------------------------------------------------------  IN: 1247.7 mL / OUT: 700 mL / NET: 547.7 mL        Mode: AC/ CMV (Assist Control/ Continuous Mandatory Ventilation), RR (machine): 16, TV (machine): 500, FiO2: 30, PEEP: 5, ITime: 1, MAP: 8, PIP: 15  acetaminophen    Suspension .. 650 milliGRAM(s) Oral every 6 hours PRN  BACItracin   Ointment 1 Application(s) Topical two times a day  chlorhexidine 0.12% Liquid 15 milliLiter(s) Oral Mucosa every 12 hours  chlorhexidine 4% Liquid 1 Application(s) Topical <User Schedule>  chlorhexidine 4% Liquid 1 Application(s) Topical <User Schedule>  dexMEDEtomidine Infusion 0.2 MICROgram(s)/kG/Hr (4.05 mL/Hr) IV Continuous <Continuous>  dextrose 5%. 1000 milliLiter(s) (50 mL/Hr) IV Continuous <Continuous>  dextrose 50% Injectable 12.5 Gram(s) IV Push once  dextrose 50% Injectable 25 Gram(s) IV Push once  diphtheria/tetanus/pertussis (acellular) Vaccine (ADAcel) 0.5 milliLiter(s) IntraMuscular once  doxazosin 2 milliGRAM(s) Oral at bedtime  enoxaparin Injectable 40 milliGRAM(s) SubCutaneous <User Schedule>  glucagon  Injectable 1 milliGRAM(s) IntraMuscular once PRN  influenza   Vaccine 0.5 milliLiter(s) IntraMuscular once  insulin lispro (HumaLOG) corrective regimen sliding scale   SubCutaneous every 6 hours  insulin NPH human recombinant 12 Unit(s) SubCutaneous every 6 hours  norepinephrine Infusion 0.05 MICROgram(s)/kG/Min (3.8 mL/Hr) IV Continuous <Continuous>  ondansetron Injectable 4 milliGRAM(s) IV Push every 6 hours PRN  oxyCODONE    IR 5 milliGRAM(s) Oral every 4 hours PRN  oxyCODONE    IR 10 milliGRAM(s) Oral every 4 hours PRN  pantoprazole  Injectable 40 milliGRAM(s) IV Push daily  piperacillin/tazobactam IVPB.. 3.375 Gram(s) IV Intermittent every 8 hours  polyethylene glycol 3350 17 Gram(s) Oral two times a day  senna 2 Tablet(s) Oral at bedtime  sodium chloride 0.9% lock flush 10 milliLiter(s) IV Push every 1 hour PRN  sodium chloride 3%  Inhalation 3 milliLiter(s) Inhalation every 6 hours                            7.8    10.62 )-----------( 219      ( 19 Oct 2020 05:21 )             23.8     10-19    142  |  109<H>  |  29<H>  ----------------------------<  261<H>  4.3   |  24  |  0.85    Ca    8.0<L>      19 Oct 2020 05:21  Phos  3.9     10-19  Mg     2.3     10-19    TPro  5.6<L>  /  Alb  2.6<L>  /  TBili  0.4  /  DBili  x   /  AST  66<H>  /  ALT  58<H>  /  AlkPhos  68  10-19    LIVER FUNCTIONS - ( 19 Oct 2020 05:21 )  Alb: 2.6 g/dL / Pro: 5.6 g/dL / ALK PHOS: 68 U/L / ALT: 58 U/L / AST: 66 U/L / GGT: x           ABG - ( 19 Oct 2020 05:16 )  pH, Arterial: 7.40  pH, Blood: x     /  pCO2: 42    /  pO2: 78    / HCO3: 25    / Base Excess: .6    /  SaO2: 96          EXAMINATION:  Gen: intubated  HEENT: collar, perrla  CV: s1, s2   Resp: CTAB  Abd: soft nt/nd  Extrem: pulses +2 throughout  neuro: EO spont, fc with eyes/nods head x3 with choices, BUE delt 1-2/5, bi 4-/5 tri 0/5, HG 05, LE flaccid and t5 sensory level  Biceps

## 2020-10-19 NOTE — CONSULT NOTE ADULT - ASSESSMENT
65M with no pertinent PMH p/w traumatic cervical cord injury s/p C3-5 laminectomy with C2-6 fusion noted to have bradycardiac episode 2/2 vasovagal for EPS consulted for PPM     - give the prolonging of R-R interval preceeding the events, the bradycardia episode is likely vasovagal in nature. currently there is no indication for pacing.   - if persistent episodes with cardio inhibitory effect, will re-consider pacing indications   - recommend wean pressor as tolerates to prevent reflex parasympathetic response   - monitor hydration status and replete as needed   - ongoing GOC discussion between the NSGY team and the family   - plan was discussed with EP attending and cardiology attending Dr. Owens     Thank you,     Surya Nettles MD  Cardiology Fellow  [phone]   All Cardiology service information can be found 24/7 on [email or web address], password: [email or web address]

## 2020-10-19 NOTE — PROGRESS NOTE ADULT - ASSESSMENT
ASSESSMENT/PLAN: Traumatic spinal cord injury/PETR A cervical cord injury status post C3-5 laminectomy with C2-6 fusion, post-operative day 3    NEURO:  Pain control  Monitor drain output  spinal precautions  Sedation: precedex, fent prn  Activity: [] mobilize as tolerated [] Bedrest [x] PT [x] OT - splint B/L UE and LE [x] PMNR eval for SCI facility    PULM:  Pressure support as tolerated  May need tracheostomy given high cervical injury--d/w patient and family  Aspiration precautions  VAP bundle    CV:  MAP >85 mmHg for cord perfusion, likely to end today  levo gtt  tte- stable lvef    RENAL:  Fluids: siadh, resolved, ivl  monitor electrolytes  start cardura for BPH, on flomax at home    GI:  Diet: tube feeds  GI prophylaxis [x] not indicated [] PPI:  [] other:  Bowel regimen, reglan prn     ENDO:    NPH/MAHESH  Goal euglycemia (-180)  a1c 6.3    HEME/ONC:  Screen for VTE  High risk for VTE - may need IVCF  VTE prophylaxis: [x] SCDs  [x] high risk of DVT/PE on admission due to: trauma    ID:  Gabriella-op antibiotics     SOCIAL/FAMILY:  [x] awaiting     CODE STATUS:  [x] Full Code     DISPOSITION:  [x] ICU    [x] Patient is at high risk of neurologic deterioration/death due to: cord compression, acute respiratory failure    MISC: L subclavian (10/15)   ASSESSMENT/PLAN: Traumatic spinal cord injury/PERT A cervical cord injury status post C3-5 laminectomy with C2-6 fusion, post-operative day 4    NEURO:  Pain control  Monitor drain output  spinal precautions  Sedation:  Fent gtt -->RASS neg 2  Activity: [] mobilize as tolerated [] Bedrest [x] PT [x] OT - splint B/L UE and LE [x] PMNR eval for SCI facility    PULM:  Pressure support as tolerated  May need tracheostomy given high cervical injury--d/w patient and family  Aspiration precautions  VAP bundle    CV: Bradycardia, asystole, shock  Possible vagal etiology  MAP >65 mmHg. Levo gtt--> changed to dopamine  TTE- stable  Cardiology recommending EP.     RENAL:  Fluids: SIADH. Resolved  Monitor electrolytes  On cardura for BPH-->DC re hypotension    GI:  Diet: Tube feeds  GI prophylaxis [x] not indicated [] PPI:  [] other:  Bowel regimen    ENDO:    NPH/MAHESH, increase  Goal euglycemia (-180)  a1c 6.3    HEME/ONC:  Screen for VTE- negative  High risk for VTE - may need IVCF  VTE prophylaxis: [x] SCDs  [x] high risk of DVT/PE on admission due to: trauma    ID:  Likely asp PNA  Procal elevated  Continue zosyn  Follow up all cultures    SOCIAL/FAMILY:  [x] awaiting     CODE STATUS:  [x] Full Code     DISPOSITION:  [x] ICU    [x] Patient is at high risk of neurologic deterioration/death due to: cord compression, acute respiratory failure, septic shock, cardiac arrest    MISC: L subclavian (10/15)

## 2020-10-19 NOTE — PROGRESS NOTE ADULT - SUBJECTIVE AND OBJECTIVE BOX
Patient seen and examined at bedside.    --Anticoagulation--  enoxaparin Injectable 40 milliGRAM(s) SubCutaneous <User Schedule>    T(C): 37.6 (10-19-20 @ 00:00), Max: 38.9 (10-18-20 @ 19:00)  HR: 70 (10-19-20 @ 05:22) (61 - 106)  BP: --  RR: 17 (10-19-20 @ 02:00) (14 - 26)  SpO2: 97% (10-19-20 @ 05:22) (63% - 100%)  Wt(kg): --    Exam: Intubated, EO to voice, EOMI, Ox3 nods appropriately, LUE delts 3/5, LUE  triceps 1-2/5, LUE biceps 4-/5, HG 0/5, RUE delts 2-3/5,  RUE biceps 3/5, RUE tri 1-2/5, HG 1/5, b/l  LE flaccid, T4 sensory level

## 2020-10-19 NOTE — CONSULT NOTE ADULT - SUBJECTIVE AND OBJECTIVE BOX
Patient seen and evaluated at bedside    Chief Complaint: s/p fall from ladder     HPI:  54M who fell >10 feet from ladder and was found unable to move arms or legs on 10/14/20. He was seen in the [facility] ED where he was a Level 1 Trauma. He was noted to have an PETR A cervical cord injury with MRI notable for C3-4 central disc herniation resulting in compression of the spinal cord associated with spinal cord edema from C3 through C4 as well as extensive prevertebral soft tissue swelling at C1 through C5 and C7-T3 with probable disruption of the anterior longitudinal ligament and interspinous ligamentous injury at C2-C6 for which he was taken for emergent C3-5 laminectomy with C2-6 fusion. On 10/18 PM and 10/19 AM, pt noted to have bradycardiac episodes with sinus pauses of up to 6 seconds likely vasovagal in nature in the setting of hypoxia and micturition from being straightcathed. EPS was consulted for PPM. Currently sinus rate is in the 60s.        PMHx:   No pertinent past medical history        PSHx:   No significant past surgical history        Allergies:  No Known Allergies      Home Meds:    Current Medications:   acetaminophen    Suspension .. 650 milliGRAM(s) Oral every 6 hours PRN  BACItracin   Ointment 1 Application(s) Topical two times a day  chlorhexidine 0.12% Liquid 15 milliLiter(s) Oral Mucosa every 12 hours  chlorhexidine 4% Liquid 1 Application(s) Topical <User Schedule>  chlorhexidine 4% Liquid 1 Application(s) Topical <User Schedule>  dextrose 5%. 1000 milliLiter(s) IV Continuous <Continuous>  dextrose 50% Injectable 12.5 Gram(s) IV Push once  dextrose 50% Injectable 25 Gram(s) IV Push once  diphtheria/tetanus/pertussis (acellular) Vaccine (ADAcel) 0.5 milliLiter(s) IntraMuscular once  DOPamine Infusion 2 MICROgram(s)/kG/Min IV Continuous <Continuous>  enoxaparin Injectable 40 milliGRAM(s) SubCutaneous <User Schedule>  fentaNYL   Infusion... 0.5 MICROgram(s)/kG/Hr IV Continuous <Continuous>  glucagon  Injectable 1 milliGRAM(s) IntraMuscular once PRN  influenza   Vaccine 0.5 milliLiter(s) IntraMuscular once  insulin lispro (HumaLOG) corrective regimen sliding scale   SubCutaneous every 6 hours  insulin NPH human recombinant 14 Unit(s) SubCutaneous every 6 hours  ondansetron Injectable 4 milliGRAM(s) IV Push every 6 hours PRN  oxyCODONE    IR 5 milliGRAM(s) Oral every 4 hours PRN  oxyCODONE    IR 10 milliGRAM(s) Oral every 4 hours PRN  pantoprazole  Injectable 40 milliGRAM(s) IV Push daily  piperacillin/tazobactam IVPB.. 3.375 Gram(s) IV Intermittent every 8 hours  polyethylene glycol 3350 17 Gram(s) Oral two times a day  senna 2 Tablet(s) Oral at bedtime  sodium chloride 0.9% lock flush 10 milliLiter(s) IV Push every 1 hour PRN  sodium chloride 3%  Inhalation 3 milliLiter(s) Inhalation every 6 hours      FAMILY HISTORY: NC       Social History: reviewed     REVIEW OF SYSTEMS:  [x] Unable to assess ROS due to intubated sedated       Physical Exam:  T(F): 99.3 (10-19), Max: 102 (10-18)  HR: 72 (10-19) (0 - 106)  BP: --  RR: 16 (10-19)  SpO2: 94% (10-19)  GENERAL: intubated sedated   HEAD: +collar, areas of superficial laceration   ENT: JVD difficult to assess   CHEST/LUNG: Clear to auscultation bilaterally  HEART: Regular rate and rhythm; No murmurs, rubs, or gallops  ABDOMEN: Soft, Nontender, Nondistended  EXTREMITIES:  No clubbing, cyanosis, or edema    Cardiovascular Diagnostic Testing:    ECG: Personally reviewed: on admission, NSR with no STT changes     Echo: Personally reviewed: none     Stress Testing: none     Cath: none     Imaging:    CXR: Personally reviewed    Labs: Personally reviewed                        7.8    10.62 )-----------( 219      ( 19 Oct 2020 05:21 )             23.8     10-19    142  |  109<H>  |  29<H>  ----------------------------<  261<H>  4.3   |  24  |  0.85    Ca    8.0<L>      19 Oct 2020 05:21  Phos  3.9     10-19  Mg     2.3     10-19    TPro  5.6<L>  /  Alb  2.6<L>  /  TBili  0.4  /  DBili  x   /  AST  66<H>  /  ALT  58<H>  /  AlkPhos  68  10-19

## 2020-10-20 NOTE — PROGRESS NOTE ADULT - SUBJECTIVE AND OBJECTIVE BOX
NSCU ATTENDING -- ADDITIONAL PROGRESS NOTE    Nighttime rounds were performed -- please refer to earlier Progress Note for HPI details.    T(C): 36.9 (10-20-20 @ 19:00), Max: 37.3 (10-19-20 @ 23:00)  HR: 68 (10-20-20 @ 21:00) (54 - 78)  BP: --  RR: 16 (10-20-20 @ 21:00) (12 - 24)  SpO2: 98% (10-20-20 @ 21:00) (89% - 100%)  Wt(kg): --    Relevant labwork and imaging reviewed.    Patient remains critically ill.    Neurologically unchanged; nods appropriately.  H/H downtrending, got 1U PRBC today, follow-up this evening.  Maintain vent settings based on last ABG.  Continues with intermittent bradycardia but self-resolves; still on dopamine GTT.    Additional 30 minutes of critical care time. NSCU ATTENDING -- ADDITIONAL PROGRESS NOTE    Nighttime rounds were performed -- please refer to earlier Progress Note for HPI details.    T(C): 36.9 (10-20-20 @ 19:00), Max: 37.3 (10-19-20 @ 23:00)  HR: 68 (10-20-20 @ 21:00) (54 - 78)  BP: --  RR: 16 (10-20-20 @ 21:00) (12 - 24)  SpO2: 98% (10-20-20 @ 21:00) (89% - 100%)  Wt(kg): --    Relevant labwork and imaging reviewed.    Neurologically unchanged; nods appropriately.  H/H downtrending, got 1U PRBC today, follow-up this evening.  Maintain vent settings based on last ABG.  Continues with intermittent bradycardia but self-resolves; still on dopamine GTT.

## 2020-10-20 NOTE — PROGRESS NOTE ADULT - ASSESSMENT
Asystole   Bradycardia  Hypotension     all likely due to cervical spine injury causing sympathetic dysfunction with unopposed high vagal tone   suggest   avoid any av sara blockers   avoid midodrine  cont Dopamine for presser and low HR   atropine PRN     anemia  Monitor hemoglobin, transfuse as needed.

## 2020-10-20 NOTE — PROGRESS NOTE ADULT - SUBJECTIVE AND OBJECTIVE BOX
Patient seen and examined at bedside.    --Anticoagulation--  enoxaparin Injectable 40 milliGRAM(s) SubCutaneous <User Schedule>    T(C): 37.6 (10-19-20 @ 00:00), Max: 38.9 (10-18-20 @ 19:00)  HR: 70 (10-19-20 @ 05:22) (61 - 106)  BP: --  RR: 17 (10-19-20 @ 02:00) (14 - 26)  SpO2: 97% (10-19-20 @ 05:22) (63% - 100%)  Wt(kg): --    Exam: Intubated, EO to voice, EOMI, Ox3 nods appropriately, LUE delts 3/5, LUE  triceps 1-2/5, LUE biceps 4-/5, HG 0/5, RUE delts 2-3/5,  RUE biceps 3/5, RUE tri 1-2/5, HG 1/5, b/l  LE flaccid

## 2020-10-20 NOTE — PROGRESS NOTE ADULT - SUBJECTIVE AND OBJECTIVE BOX
SUMMARY:  54 year-old Cantonese-speaking man who fell >10 feet from ladder and was found unable to move arms or legs on 10/14/20. He was seen in the [facility] ED where he was a Level 1 Trauma. He was noted to have an PETR A cervical cord injury with MRI notable for C3-4 central disc herniation resulting in compression of the spinal cord associated with spinal cord edema from C3 through C4 as well as extensive prevertebral soft tissue swelling at C1 through C5 and C7-T3 with probable disruption of the anterior longitudinal ligament and interspinous ligamentous injury at C2-C6 for which he was taken for emergent C3-5 laminectomy with C2-6 fusion.    Overnight events: Bradycardic. Significant sinus pause of 45s which resolved prior to atropine (none given)  Extubated and reintubated due to hypoxia and hypercarbia          ICU Vital Signs Last 24 Hrs  T(C): 37 (20 Oct 2020 03:00), Max: 37.6 (19 Oct 2020 16:00)  T(F): 98.6 (20 Oct 2020 03:00), Max: 99.7 (19 Oct 2020 16:00)  HR: 57 (20 Oct 2020 05:00) (54 - 82)  BP: --  BP(mean): --  ABP: 116/50 (20 Oct 2020 05:00) (85/44 - 155/67)  ABP(mean): 75 (20 Oct 2020 05:00) (60 - 103)  RR: 16 (20 Oct 2020 05:00) (14 - 24)  SpO2: 98% (20 Oct 2020 05:00) (90% - 100%)      10-19-20 @ 07:01  -  10-20-20 @ 07:00  --------------------------------------------------------  IN: 2059.3 mL / OUT: 1340 mL / NET: 719.3 mL      Mode: AC/ CMV (Assist Control/ Continuous Mandatory Ventilation), RR (machine): 16, TV (machine): 500, FiO2: 30, PEEP: 5, ITime: 1, MAP: 8, PIP: 18  acetaminophen    Suspension .. 650 milliGRAM(s) Oral every 6 hours PRN  BACItracin   Ointment 1 Application(s) Topical two times a day  chlorhexidine 0.12% Liquid 15 milliLiter(s) Oral Mucosa every 12 hours  chlorhexidine 4% Liquid 1 Application(s) Topical <User Schedule>  chlorhexidine 4% Liquid 1 Application(s) Topical <User Schedule>  dextrose 5%. 1000 milliLiter(s) (50 mL/Hr) IV Continuous <Continuous>  dextrose 50% Injectable 12.5 Gram(s) IV Push once  dextrose 50% Injectable 25 Gram(s) IV Push once  diphtheria/tetanus/pertussis (acellular) Vaccine (ADAcel) 0.5 milliLiter(s) IntraMuscular once  DOPamine Infusion 2 MICROgram(s)/kG/Min (6.08 mL/Hr) IV Continuous <Continuous>  enoxaparin Injectable 40 milliGRAM(s) SubCutaneous <User Schedule>  fentaNYL   Infusion... 0.5 MICROgram(s)/kG/Hr (2.03 mL/Hr) IV Continuous <Continuous>  glucagon  Injectable 1 milliGRAM(s) IntraMuscular once PRN  glucagon  Injectable 1 milliGRAM(s) IntraMuscular once PRN  influenza   Vaccine 0.5 milliLiter(s) IntraMuscular once  insulin lispro (HumaLOG) corrective regimen sliding scale   SubCutaneous every 6 hours  insulin NPH human recombinant 16 Unit(s) SubCutaneous every 6 hours  ondansetron Injectable 4 milliGRAM(s) IV Push every 6 hours PRN  oxyCODONE    IR 5 milliGRAM(s) Oral every 4 hours PRN  oxyCODONE    IR 10 milliGRAM(s) Oral every 4 hours PRN  pantoprazole  Injectable 40 milliGRAM(s) IV Push daily  piperacillin/tazobactam IVPB.. 3.375 Gram(s) IV Intermittent every 8 hours  polyethylene glycol 3350 17 Gram(s) Oral two times a day  senna 2 Tablet(s) Oral at bedtime  sodium chloride 0.9% lock flush 10 milliLiter(s) IV Push every 1 hour PRN  sodium chloride 3%  Inhalation 3 milliLiter(s) Inhalation every 6 hours                            7.4    12.32 )-----------( 263      ( 20 Oct 2020 05:05 )             21.8     10-19    148<H>  |  113<H>  |  30<H>  ----------------------------<  242<H>  4.1   |  26  |  0.83    Ca    8.7      19 Oct 2020 21:28  Phos  2.9     10-19  Mg     2.4     10-19    TPro  5.6<L>  /  Alb  2.6<L>  /  TBili  0.4  /  DBili  x   /  AST  66<H>  /  ALT  58<H>  /  AlkPhos  68  10-19    LIVER FUNCTIONS - ( 19 Oct 2020 05:21 )  Alb: 2.6 g/dL / Pro: 5.6 g/dL / ALK PHOS: 68 U/L / ALT: 58 U/L / AST: 66 U/L / GGT: x           ABG - ( 19 Oct 2020 12:46 )  pH, Arterial: 7.44  pH, Blood: x     /  pCO2: 40    /  pO2: 97    / HCO3: 27    / Base Excess: 3.2   /  SaO2: 98                        Mode: AC/ CMV (Assist Control/ Continuous Mandatory Ventilation), RR (machine): 16, TV (machine): 500, FiO2: 30, PEEP: 5, ITime: 1, MAP: 8, PIP: 15  acetaminophen    Suspension .. 650 milliGRAM(s) Oral every 6 hours PRN  BACItracin   Ointment 1 Application(s) Topical two times a day  chlorhexidine 0.12% Liquid 15 milliLiter(s) Oral Mucosa every 12 hours  chlorhexidine 4% Liquid 1 Application(s) Topical <User Schedule>  chlorhexidine 4% Liquid 1 Application(s) Topical <User Schedule>  dexMEDEtomidine Infusion 0.2 MICROgram(s)/kG/Hr (4.05 mL/Hr) IV Continuous <Continuous>  dextrose 5%. 1000 milliLiter(s) (50 mL/Hr) IV Continuous <Continuous>  dextrose 50% Injectable 12.5 Gram(s) IV Push once  dextrose 50% Injectable 25 Gram(s) IV Push once  diphtheria/tetanus/pertussis (acellular) Vaccine (ADAcel) 0.5 milliLiter(s) IntraMuscular once  doxazosin 2 milliGRAM(s) Oral at bedtime  enoxaparin Injectable 40 milliGRAM(s) SubCutaneous <User Schedule>  glucagon  Injectable 1 milliGRAM(s) IntraMuscular once PRN  influenza   Vaccine 0.5 milliLiter(s) IntraMuscular once  insulin lispro (HumaLOG) corrective regimen sliding scale   SubCutaneous every 6 hours  insulin NPH human recombinant 12 Unit(s) SubCutaneous every 6 hours  norepinephrine Infusion 0.05 MICROgram(s)/kG/Min (3.8 mL/Hr) IV Continuous <Continuous>  ondansetron Injectable 4 milliGRAM(s) IV Push every 6 hours PRN  oxyCODONE    IR 5 milliGRAM(s) Oral every 4 hours PRN  oxyCODONE    IR 10 milliGRAM(s) Oral every 4 hours PRN  pantoprazole  Injectable 40 milliGRAM(s) IV Push daily  piperacillin/tazobactam IVPB.. 3.375 Gram(s) IV Intermittent every 8 hours  polyethylene glycol 3350 17 Gram(s) Oral two times a day  senna 2 Tablet(s) Oral at bedtime  sodium chloride 0.9% lock flush 10 milliLiter(s) IV Push every 1 hour PRN  sodium chloride 3%  Inhalation 3 milliLiter(s) Inhalation every 6 hours                            7.8    10.62 )-----------( 219      ( 19 Oct 2020 05:21 )             23.8     10-19    142  |  109<H>  |  29<H>  ----------------------------<  261<H>  4.3   |  24  |  0.85    Ca    8.0<L>      19 Oct 2020 05:21  Phos  3.9     10-19  Mg     2.3     10-19    TPro  5.6<L>  /  Alb  2.6<L>  /  TBili  0.4  /  DBili  x   /  AST  66<H>  /  ALT  58<H>  /  AlkPhos  68  10-19    LIVER FUNCTIONS - ( 19 Oct 2020 05:21 )  Alb: 2.6 g/dL / Pro: 5.6 g/dL / ALK PHOS: 68 U/L / ALT: 58 U/L / AST: 66 U/L / GGT: x           ABG - ( 19 Oct 2020 05:16 )  pH, Arterial: 7.40  pH, Blood: x     /  pCO2: 42    /  pO2: 78    / HCO3: 25    / Base Excess: .6    /  SaO2: 96          EXAMINATION:  Gen: intubated  HEENT: collar, perrla  CV: s1, s2   Resp: CTAB  Abd: soft nt/nd  Extrem: pulses +2 throughout  neuro: EO spont, fc with eyes/nods head x3 with choices, BUE delt 1-2/5, bi 4-/5 tri 0/5, HG 05, LE flaccid and  t4 sensory level  Biceps               SUMMARY:  54 year-old Cantonese-speaking man who fell >10 feet from ladder and was found unable to move arms or legs on 10/14/20. He was seen in the [facility] ED where he was a Level 1 Trauma. He was noted to have an PETR A cervical cord injury with MRI notable for C3-4 central disc herniation resulting in compression of the spinal cord associated with spinal cord edema from C3 through C4 as well as extensive prevertebral soft tissue swelling at C1 through C5 and C7-T3 with probable disruption of the anterior longitudinal ligament and interspinous ligamentous injury at C2-C6 for which he was taken for emergent C3-5 laminectomy with C2-6 fusion.    10/18: Extubated and reintubated due to hypoxia and hypercarbia  10/19: Bradycardic. Significant sinus pause of 45s which resolved prior to atropine (none given)    Overnight events: Afebrile. No further sinus pauses      ICU Vital Signs Last 24 Hrs  T(C): 37 (20 Oct 2020 03:00), Max: 37.6 (19 Oct 2020 16:00)  T(F): 98.6 (20 Oct 2020 03:00), Max: 99.7 (19 Oct 2020 16:00)  HR: 57 (20 Oct 2020 05:00) (54 - 82)  ABP: 116/50 (20 Oct 2020 05:00) (85/44 - 155/67)  ABP(mean): 75 (20 Oct 2020 05:00) (60 - 103)  RR: 16 (20 Oct 2020 05:00) (14 - 24)  SpO2: 98% (20 Oct 2020 05:00) (90% - 100%)      10-19-20 @ 07:01  -  10-20-20 @ 07:00  --------------------------------------------------------  IN: 2059.3 mL / OUT: 1340 mL / NET: 719.3 mL      Mode: AC/ CMV (Assist Control/ Continuous Mandatory Ventilation), RR (machine): 16, TV (machine): 500, FiO2: 30, PEEP: 5, ITime: 1, MAP: 8, PIP: 18  acetaminophen    Suspension .. 650 milliGRAM(s) Oral every 6 hours PRN  BACItracin   Ointment 1 Application(s) Topical two times a day  chlorhexidine 0.12% Liquid 15 milliLiter(s) Oral Mucosa every 12 hours  chlorhexidine 4% Liquid 1 Application(s) Topical <User Schedule>  chlorhexidine 4% Liquid 1 Application(s) Topical <User Schedule>  dextrose 5%. 1000 milliLiter(s) (50 mL/Hr) IV Continuous <Continuous>  dextrose 50% Injectable 12.5 Gram(s) IV Push once  dextrose 50% Injectable 25 Gram(s) IV Push once  diphtheria/tetanus/pertussis (acellular) Vaccine (ADAcel) 0.5 milliLiter(s) IntraMuscular once  DOPamine Infusion 2 MICROgram(s)/kG/Min (6.08 mL/Hr) IV Continuous <Continuous>  enoxaparin Injectable 40 milliGRAM(s) SubCutaneous <User Schedule>  fentaNYL   Infusion... 0.5 MICROgram(s)/kG/Hr (2.03 mL/Hr) IV Continuous <Continuous>  glucagon  Injectable 1 milliGRAM(s) IntraMuscular once PRN  glucagon  Injectable 1 milliGRAM(s) IntraMuscular once PRN  influenza   Vaccine 0.5 milliLiter(s) IntraMuscular once  insulin lispro (HumaLOG) corrective regimen sliding scale   SubCutaneous every 6 hours  insulin NPH human recombinant 16 Unit(s) SubCutaneous every 6 hours  ondansetron Injectable 4 milliGRAM(s) IV Push every 6 hours PRN  oxyCODONE    IR 5 milliGRAM(s) Oral every 4 hours PRN  oxyCODONE    IR 10 milliGRAM(s) Oral every 4 hours PRN  pantoprazole  Injectable 40 milliGRAM(s) IV Push daily  piperacillin/tazobactam IVPB.. 3.375 Gram(s) IV Intermittent every 8 hours  polyethylene glycol 3350 17 Gram(s) Oral two times a day  senna 2 Tablet(s) Oral at bedtime  sodium chloride 0.9% lock flush 10 milliLiter(s) IV Push every 1 hour PRN  sodium chloride 3%  Inhalation 3 milliLiter(s) Inhalation every 6 hours                            7.4    12.32 )-----------( 263      ( 20 Oct 2020 05:05 )             21.8     10-19    148<H>  |  113<H>  |  30<H>  ----------------------------<  242<H>  4.1   |  26  |  0.83    Ca    8.7      19 Oct 2020 21:28  Phos  2.9     10-19  Mg     2.4     10-19    TPro  5.6<L>  /  Alb  2.6<L>  /  TBili  0.4  /  DBili  x   /  AST  66<H>  /  ALT  58<H>  /  AlkPhos  68  10-19    LIVER FUNCTIONS - ( 19 Oct 2020 05:21 )  Alb: 2.6 g/dL / Pro: 5.6 g/dL / ALK PHOS: 68 U/L / ALT: 58 U/L / AST: 66 U/L / GGT: x           ABG - ( 19 Oct 2020 12:46 )  pH, Arterial: 7.44  pH, Blood: x     /  pCO2: 40    /  pO2: 97    / HCO3: 27    / Base Excess: 3.2   /  SaO2: 98          EXAMINATION:  Gen: intubated  HEENT: collar, perrla  CV: s1, s2   Resp: CTAB  Abd: soft nt/nd  Extrem: pulses +2 throughout  Neuro: EO spontaneously, follows commands with eyes/nods head x3 with choices, L deltoid 3/5, bicep 4-, tricep 1/5, HG 0/5  R deltoid 3/5, bicep 4-, tricep 1, HG 0/5.

## 2020-10-20 NOTE — PROGRESS NOTE ADULT - SUBJECTIVE AND OBJECTIVE BOX
Subjective: Patient seen and examined. No new events except as noted.     SUBJECTIVE/ROS:  ROS is limited as pt currently sedated on ventilator.       MEDICATIONS:  MEDICATIONS  (STANDING):  BACItracin   Ointment 1 Application(s) Topical two times a day  chlorhexidine 0.12% Liquid 15 milliLiter(s) Oral Mucosa every 12 hours  chlorhexidine 4% Liquid 1 Application(s) Topical <User Schedule>  chlorhexidine 4% Liquid 1 Application(s) Topical <User Schedule>  dextrose 5%. 1000 milliLiter(s) (50 mL/Hr) IV Continuous <Continuous>  dextrose 50% Injectable 12.5 Gram(s) IV Push once  dextrose 50% Injectable 25 Gram(s) IV Push once  diphtheria/tetanus/pertussis (acellular) Vaccine (ADAcel) 0.5 milliLiter(s) IntraMuscular once  DOPamine Infusion 2 MICROgram(s)/kG/Min (6.08 mL/Hr) IV Continuous <Continuous>  enoxaparin Injectable 40 milliGRAM(s) SubCutaneous <User Schedule>  fentaNYL   Infusion... 0.5 MICROgram(s)/kG/Hr (2.03 mL/Hr) IV Continuous <Continuous>  influenza   Vaccine 0.5 milliLiter(s) IntraMuscular once  insulin lispro (HumaLOG) corrective regimen sliding scale   SubCutaneous every 6 hours  insulin NPH human recombinant 16 Unit(s) SubCutaneous every 6 hours  pantoprazole  Injectable 40 milliGRAM(s) IV Push daily  piperacillin/tazobactam IVPB.. 3.375 Gram(s) IV Intermittent every 8 hours  polyethylene glycol 3350 17 Gram(s) Oral two times a day  senna 2 Tablet(s) Oral at bedtime  sodium chloride 3%  Inhalation 3 milliLiter(s) Inhalation every 6 hours      PHYSICAL EXAM:  T(C): 36.7 (10-20-20 @ 07:00), Max: 37.6 (10-19-20 @ 16:00)  HR: 62 (10-20-20 @ 09:15) (54 - 82)  BP: --  RR: 15 (10-20-20 @ 09:15) (14 - 24)  SpO2: 99% (10-20-20 @ 09:15) (90% - 100%)  Wt(kg): --  I&O's Summary    19 Oct 2020 07:01  -  20 Oct 2020 07:00  --------------------------------------------------------  IN: 2583.2 mL / OUT: 1490 mL / NET: 1093.2 mL    20 Oct 2020 07:01  -  20 Oct 2020 10:28  --------------------------------------------------------  IN: 581.1 mL / OUT: 150 mL / NET: 431.1 mL        Appearance: on vent 	  Cardiovascular: Normal S1 S2,    Murmur:   Neck: JVP limited to be evaluated   Respiratory: Lungs few rhonchi   Gastrointestinal:  Soft  Skin: normal   Neuro: limited as pt on vent      LABS/DATA:    CARDIAC MARKERS:                                7.4    12.32 )-----------( 263      ( 20 Oct 2020 05:05 )             21.8     10-19    148<H>  |  113<H>  |  30<H>  ----------------------------<  242<H>  4.1   |  26  |  0.83    Ca    8.7      19 Oct 2020 21:28  Phos  2.9     10-19  Mg     2.4     10-19    TPro  5.6<L>  /  Alb  2.6<L>  /  TBili  0.4  /  DBili  x   /  AST  66<H>  /  ALT  58<H>  /  AlkPhos  68  10-19    proBNP:   Lipid Profile:   HgA1c:   TSH:     TELE:  EKG:

## 2020-10-20 NOTE — PROGRESS NOTE ADULT - ASSESSMENT
ASSESSMENT/PLAN: Traumatic spinal cord injury/PETR A cervical cord injury status post C3-5 laminectomy with C2-6 fusion, post-operative day 4    NEURO:  Pain control  Monitor drain output  spinal precautions  Sedation:  Fent gtt -->RASS neg 2  Activity: [] mobilize as tolerated [] Bedrest [x] PT [x] OT - splint B/L UE and LE [x] PMNR eval for SCI facility    PULM:  Pressure support as tolerated  May need tracheostomy given high cervical injury--d/w patient and family  Aspiration precautions  VAP bundle    CV: Bradycardia, asystole, shock  Possible vagal etiology  MAP >65 mmHg. Levo gtt--> changed to dopamine  TTE- stable  Cardiology following  EP following no intervention at this time, likely vagal .     RENAL:  Fluids: SIADH. Resolved  Monitor electrolytes  On cardura for BPH-->DC re hypotension    GI:  Diet: Tube feeds  GI prophylaxis [x] not indicated [] PPI:  [] other:  Bowel regimen    ENDO:    NPH/MAHESH, increase  Goal euglycemia (-180)  a1c 6.3    HEME/ONC:  Screen for VTE- negative  High risk for VTE - may need IVCF  VTE prophylaxis: [x] SCDs  [x] high risk of DVT/PE on admission due to: trauma    ID:  Likely asp PNA  Procal elevated  Continue zosyn total 7 days   Follow up all cultures    SOCIAL/FAMILY:  [x] awaiting     CODE STATUS:  [x] Full Code     DISPOSITION:  [x] ICU    [x] Patient is at high risk of neurologic deterioration/death due to: cord compression, acute respiratory failure, septic shock, cardiac arrest    MISC: L subclavian (10/15)   ASSESSMENT/PLAN: Traumatic spinal cord injury/PETR A cervical cord injury status post C3-5 laminectomy with C2-6 fusion, post-operative day 5    NEURO:  Neurochecks Q4  Pain control  Monitor drain output  spinal precautions  Sedation:  Fent gtt -->RASS neg 2  Activity: [] mobilize as tolerated [] Bedrest [x] PT [x] OT - splint B/L UE and LE [x] PMNR eval for SCI facility    PULM:  Pressure support as tolerated  May need tracheostomy given high cervical injury--d/w patient and family, however, pt does not seem amenable to this  Aspiration precautions  VAP bundle  Goal for extubation in next 24-48 hours to BIPAP    CV: Bradycardia, asystole, shock  Possible vasovagal etiology  MAP >65 mmHg. Levo gtt--> changed to dopamine  TTE- stable  Cardiology following  EP following no intervention at this time, likely vagal .       RENAL:  Fluids: SIADH. Resolved  Monitor electrolytes      GI:  Diet: Tube feeds  GI prophylaxis [x] not indicated [] PPI:  [] other:  Bowel regimen    ENDO:    NPH/MAHESH, increase  Goal euglycemia (-180)  A1c 6.3    HEME/ONC:  Screen for VTE- negative  High risk for VTE - may need IVCF  VTE prophylaxis: [x] SCDs  [x] high risk of DVT/PE on admission due to: trauma    ID:  Procal elevated  Continue zosyn total 7 days   Sputum cultures- enterobacter, hafnia. Gram negatives  Sensitivity pending    SOCIAL/FAMILY:  [x] awaiting     CODE STATUS:  [x] Full Code     DISPOSITION:  [x] ICU    [x] Patient is at high risk of neurologic deterioration/death due to: cord compression, acute respiratory failure, septic shock, cardiac arrest    MISC: L subclavian (10/15)

## 2020-10-20 NOTE — PROGRESS NOTE ADULT - ASSESSMENT
Traumatic spinal cord injury; C3-4 status post decompression/fusion    Failed extubation x 1; DNR.  Family deciding re: trach/PEG.  On dopamine, fentanyl GTT.  Tube feeds increased from 60 to 75.  Maintain current vent settings based on last ABG.    - fam discussion pending

## 2020-10-20 NOTE — CHART NOTE - NSCHARTNOTEFT_GEN_A_CORE
Nutrition Follow Up Note     Patient seen for: nutrition follow up on ICU    Source: patient, medical record, communication with team.     Chart reviewed, events noted. "54 year-old Cantonese-speaking man who fell >10 feet from ladder and was found unable to move arms or legs on 10/14/20. He was seen in the [facility] ED where he was a Level 1 Trauma. He was noted to have an PETR A cervical cord injury with MRI notable for C3-4 central disc herniation resulting in compression of the spinal cord associated with spinal cord edema from C3 through C4 as well as extensive prevertebral soft tissue swelling at C1 through C5 and C7-T3 with probable disruption of the longitudinal ligament and interspinous ligamentous injury at C2-C6 for which he was taken for emergent C3-5 laminectomy with C2-6 fusion".     Interim events: Pt extubated 10/18, however reintubated on same day in context of hypercapnia and hypoxic respiratory failure. Noted with overnight event 10/19: bradycardia; significant sinus pause of 45s. Per electrophysiology, "bradycardia episode is likely vasovagal in nature". Pending discussions regarding goals of care (extubation, tracheostomy, PEG). Pt continues on antibiotics in context of likely aspiration pneumonia. Continues on NPH and Humalog to promote glycemic control.     Pt observed resting in bed; unable to participate in nutrition evaluation at this time in context of intubated and sedated status.     Diet Order: Diet, NPO with Tube Feed:   Tube Feeding Modality: Nasogastric  Glucerna 1.2 Jesse (GLUCERNARTH)  Total Volume for 24 Hours (mL): 1800  Continuous  Starting Tube Feed Rate {mL per Hour}: 20     Every 2 hours  Until Goal Tube Feed Rate (mL per Hour): 75  Tube Feed Duration (in Hours): 24  Tube Feed Start Time: 10:00  Supplement Feeding Modality:  Nasogastric  Probiotic Yogurt/Smoothie Cans or Servings Per Day:  2       Frequency:  Daily (10-19-20 @ 11:06)    CURRENT EN ORDER PROVIDES: 1800ml, 2160kcal and 108g protein.     EN provision (per nursing flow sheet):   (10/20): 375ml (thus far; EN feeds infusing at 75ml/hr)  (10/19): 1605ml (EN feeds increased from 60ml/hr to 75ml/hr midday; 89% of goal)  (10/18): 360ml (25% of goal; Glucerna 1.2 at 60ml/hr x 24 hrs; EN held for extubation/reintubation)  (10/17): 1440ml (100% of goal; Glucerna 1.2 at 60ml/hr x 24 hrs)  (10/16): 720ml (50% of goal; Glucerna 1.2 at 60ml/hr x 24 hrs)  (10/15): 100ml (EN initiated)    Stool count (per nursing flow sheet):   (10/19): x 1  (10/18): x 2  (10/17): x 1  On bowel regimen as ordered      Anthropometric Measurements:   Height (cm): 170.2 (10-16-20 @ 16:15)  Weight (kg): 81 (10-14-20 @ 20:25)  BMI (kg/m2): 28 (10-16-20 @ 16:15)    Medications: MEDICATIONS  (STANDING):  BACItracin   Ointment 1 Application(s) Topical two times a day  chlorhexidine 0.12% Liquid 15 milliLiter(s) Oral Mucosa every 12 hours  chlorhexidine 4% Liquid 1 Application(s) Topical <User Schedule>  chlorhexidine 4% Liquid 1 Application(s) Topical <User Schedule>  dextrose 5%. 1000 milliLiter(s) (50 mL/Hr) IV Continuous <Continuous>  dextrose 50% Injectable 12.5 Gram(s) IV Push once  dextrose 50% Injectable 25 Gram(s) IV Push once  diphtheria/tetanus/pertussis (acellular) Vaccine (ADAcel) 0.5 milliLiter(s) IntraMuscular once  DOPamine Infusion 2 MICROgram(s)/kG/Min (6.08 mL/Hr) IV Continuous <Continuous>  enoxaparin Injectable 40 milliGRAM(s) SubCutaneous <User Schedule>  fentaNYL   Infusion... 0.5 MICROgram(s)/kG/Hr (2.03 mL/Hr) IV Continuous <Continuous>  influenza   Vaccine 0.5 milliLiter(s) IntraMuscular once  insulin lispro (HumaLOG) corrective regimen sliding scale   SubCutaneous every 6 hours  insulin NPH human recombinant 16 Unit(s) SubCutaneous every 6 hours  pantoprazole  Injectable 40 milliGRAM(s) IV Push daily  piperacillin/tazobactam IVPB.. 3.375 Gram(s) IV Intermittent every 8 hours  polyethylene glycol 3350 17 Gram(s) Oral two times a day  senna 2 Tablet(s) Oral at bedtime  sodium chloride 3%  Inhalation 3 milliLiter(s) Inhalation every 6 hours    MEDICATIONS  (PRN):  acetaminophen    Suspension .. 650 milliGRAM(s) Oral every 6 hours PRN Temp greater or equal to 38C (100.4F), Mild Pain (1 - 3)  glucagon  Injectable 1 milliGRAM(s) IntraMuscular once PRN Glucose LESS THAN 70 milligrams/deciliter  glucagon  Injectable 1 milliGRAM(s) IntraMuscular once PRN Glucose LESS THAN 70 milligrams/deciliter  ondansetron Injectable 4 milliGRAM(s) IV Push every 6 hours PRN Nausea and/or Vomiting  oxyCODONE    IR 5 milliGRAM(s) Oral every 4 hours PRN Moderate Pain (4 - 6)  oxyCODONE    IR 10 milliGRAM(s) Oral every 4 hours PRN Severe Pain (7 - 10)  sodium chloride 0.9% lock flush 10 milliLiter(s) IV Push every 1 hour PRN Pre/post blood products, medications, blood draw, and to maintain line patency    Labs: 10-20 @ 05:05: Sodium --, Potassium --, Calcium --, Magnesium --, Phosphorus --, BUN --, Creatinine --, Glucose --, Alk Phos --, ALT/SGPT --, AST/SGOT --, Albumin --, Prealbumin --, Total Bilirubin --, Hemoglobin 7.4<L>, Hematocrit 21.8<L>, Ferritin --, C-Reactive Protein --, Creatine Kinase <<27>  10-19 @ 21:28: Sodium 148<H>, Potassium 4.1, Calcium 8.7, Magnesium 2.4, Phosphorus 2.9, BUN 30<H>, Creatinine 0.83, Glucose 242<H>, Alk Phos --, ALT/SGPT --, AST/SGOT --, Albumin --, Prealbumin --, Total Bilirubin --, Hemoglobin 7.5<L>, Hematocrit 22.1<L>, Ferritin --, C-Reactive Protein --, Creatine Kinase <<27>    POCT Blood Glucose.: 194 mg/dL (10-20-20 @ 05:30)  POCT Blood Glucose.: 222 mg/dL (10-20-20 @ 01:03)  POCT Blood Glucose.: 235 mg/dL (10-19-20 @ 19:48)  POCT Blood Glucose.: 232 mg/dL (10-19-20 @ 18:17)    Skin per nursing documentation: no pressure injuries documented; surgical incision posterior lami site 10/14  Edema: 2+ generalized edema    Estimated Needs: (based on dosing wt 81kg):  Energy: (25-30kcal/kg): 2020-2424kcal  Protein: (1.2-1.4g protein/kg): 97-113g protein    Previous Nutrition Diagnosis: increase nutrient needs   Nutrition Diagnosis is: remains appropriate, ongoing with EN feeds    New Nutrition Diagnosis: n/a    Recommended Interventions:   1. Continue Glucerna 1.2 at 75ml/hr x 24 hrs. To provide: 2160kcal and 108g protein. Based on dosing wt 81kg, provides: 26.7kcal/kg and 1.3g protein/kg.  2. Continue Kali Active twice daily in context of antibiotic use.   3. Monitor wt trends, nutrition related labs, skin integrity, hydration status and bowel regularity.   4. Determine nutritional goals of care with pt/family.       Monitoring and Evaluation:   Continue to monitor nutrition provision and tolerance, weights, labs, skin integrity.   RD remains available upon request and will follow up per protocol.    Alison Kleiner RD, [facility] ([phone])

## 2020-10-21 NOTE — CONSULT NOTE ADULT - PROBLEM SELECTOR RECOMMENDATION 9
extubated 10/18, re-intubated 2 hrs later   -As per NSCU team, pt and family not interested in pursuing trach/PEG   -Reasonable to give pt a few more days on vent with PS trials as tolerated.   -Check NIF and VC  -Keep pH >7.20, keep sats >90%

## 2020-10-21 NOTE — PROGRESS NOTE ADULT - ASSESSMENT
Traumatic spinal cord injury; C3-4 central cord herniation status post decompression/fusion, post-operative day 7    - q2hr Neuro checks  - bradycardia and hypotension likely 2/2 high vasovagal tone, remains on dopamine gtt, cardiology following  - Pain control  - Wean vent as tolerated; however, appears to need tracheostomy given rapid hypercarbic resp failure after extubation, ongoing GOC with family  - cont cefepime for aspiration pna --switched today for adequate coverage of multiple organisms in sputum culture (10/21-28)  - Monitor Na  - DMII: adjust NPH as needed    DNR Traumatic spinal cord injury; C3-4 central cord herniation status post decompression/fusion, post-operative day 7    - q2hr Neuro checks  - bradycardia and hypotension likely 2/2 high vasovagal tone, remains on dopamine gtt, cardiology following  - Pain control  - Wean vent as tolerated; however, appears to need tracheostomy given rapid hypercarbic resp failure after extubation, ongoing GOC with family  - cont cefepime for aspiration pna --switched today for adequate coverage of multiple organisms in sputum culture (10/21-28)  - chest PT, hypertonics/nebs, frequent suctioning  - Monitor Na  - DMII: adjust NPH as needed    DNR

## 2020-10-21 NOTE — PROGRESS NOTE ADULT - SUBJECTIVE AND OBJECTIVE BOX
Patient seen and examined at bedside.    --Anticoagulation--  enoxaparin Injectable 40 milliGRAM(s) SubCutaneous <User Schedule>    Vital Signs Last 24 Hrs  T(C): 36.9 (21 Oct 2020 03:00), Max: 37 (20 Oct 2020 11:00)  T(F): 98.4 (21 Oct 2020 03:00), Max: 98.6 (20 Oct 2020 11:00)  HR: 56 (21 Oct 2020 05:16) (48 - 73)  BP: --  BP(mean): --  RR: 16 (21 Oct 2020 05:00) (12 - 24)  SpO2: 100% (21 Oct 2020 05:16) (89% - 100%)    Exam: Intubated, EO to voice, EOMI, Ox3 nods appropriately, LUE delts 3/5, LUE  triceps 1-2/5, LUE biceps 4-/5, HG 0/5, RUE delts 2-3/5,  RUE biceps 3/5, RUE tri 1-2/5, HG 1/5, b/l  LE flaccid

## 2020-10-21 NOTE — PROGRESS NOTE ADULT - SUBJECTIVE AND OBJECTIVE BOX
Remains intubated  on dopamine gtt    vitals/labs/meds reviewed    Awake, alert, fully oriented, follows, RUE HG 0, biceps 2/5 triceps 2/5 shoulder shrug 3/5, LUE shoulder shrug 3/5 biceps 4-/5, triceps 1-2/5, HG 1/5, BLE 0/5

## 2020-10-21 NOTE — CONSULT NOTE ADULT - CONVERSATION DETAILS
Spoke with patient at bedside in his native language Cantonese as well as English. Patient able to nod yes and no to questions. Patient nodded yes that he understood his condition of having cervical spinal cord compression s/p surgery now and had failed extubation trial with reintubation within hours. Explained to patient that due to cervical injury, patient may not tolerate extubation despite being able to do okay on CPAP trial at bedside currently. When presented with the option of tracheostomy connected to vent vs compassionate extubation; patient adamantly nodded his head NO to the trach to vent. Acknowledged that it is frustrating about not being able to communicate with team and family as he is intubated and unable to write/point at communication board. Patient denied any current pain. When asked if he wants us to call his wife, he nodded NO, but nodded yes when asked if wanted us to call his son who is a resident physician in [place].     Spoke with son Baldomero Andre via telephone ([phone]). Son verbalized good understanding of patient's condition and ongoing medical treatment. Son states that he was able to visit his father for short time a few days ago for goals of care discussion. Son states that initially patient wanted the trach when he did not know the option of compassionate extubation. However, after further discussion with patient and being aware of the option of compassionate extubation, son states that he is aware that his father's wishes are to be compassionately extubated and patient does not want re-intubation or tracheostomy; and that patient is DNR. Son states that patient was tolerating CPAP trials of 11 hours previously but has been told by primary team that he has been tolerating CPAP trials for shorter durations now. He is requesting that when patient is extubated to be placed on full support beforehand so that patient does not tire out and if patient can be extubated to Bipap so that family can spend some time with him. Discussed that compassionate extubation procedure plans can be further discussed at the time of extubation beforehand.   Son asking about Palliative Care Unit transfer as he wants to be able to visit his father which has been limited due to travel restriction guidelines as he has arrived from [place]. Discussed with son about dopamine gtt, son understands that dopamine is for his bradycardia and sinus pause. Discussed that if patient were transferred to Palliative Care Unit, there would be no escalation of dopamine to which son responds that if dopamine dose has been stable then he would like to keep dopamine at a fixed dose which will allow him to go to PCU to be able to visit patient. He would like to complete current antibiotic course for pneumonia as he doesn't want patient to become septic and worsen before he is able to visit.     Recommended to son that he should speak to primary team for today's medical updates and recommended for him to discuss with the rest of his family members including patient's wife about our discussions as we do not want son to make decisions with purpose of enabling him to be a candidate for PCU but rather to ensure we are respecting the family's wishes. Son in agreement to continue goals of care discussion tomorrow Spoke with patient at bedside in his native language Cantonese as well as English. Patient able to nod yes and no to questions. Patient nodded yes that he understood his condition of having cervical spinal cord compression s/p surgery now and had failed extubation trial with reintubation within hours. Explained to patient that due to cervical injury, patient may not tolerate extubation despite being able to do okay on CPAP trial at bedside currently. When presented with the option of tracheostomy connected to vent vs compassionate extubation; patient adamantly nodded his head NO to the trach to vent. Acknowledged that it is frustrating about not being able to communicate with team and family as he is intubated and unable to write/point at communication board. Patient denied any current pain. When asked if he wants us to call his wife, he nodded NO, but nodded yes when asked if wanted us to call his son who is a resident physician in [place].     Spoke with son Baldomero Andre via telephone ([phone]). Son verbalized good understanding of patient's condition and ongoing medical treatment. Son states that he was able to visit his father for short time a few days ago for goals of care discussion. Son states that initially patient wanted the trach when he did not know the option of compassionate extubation. However, after further discussion with patient and being aware of the option of compassionate extubation, son states that he is aware that his father's wishes are to be compassionately extubated and patient does not want re-intubation or tracheostomy; and that patient is DNR. Son states that patient was tolerating CPAP trials of 11 hours previously but has been told by primary team that he has been tolerating CPAP trials for shorter durations now. He is requesting that when patient is extubated to be placed on full support beforehand so that patient does not tire out and if patient can be extubated to Bipap so that family can spend some time with him. Discussed that compassionate extubation procedure plans can be further discussed at the time of extubation beforehand.   Son asking about Palliative Care Unit transfer as he wants to be able to visit his father which has been limited due to travel restriction guidelines as he has arrived from [place]. Discussed with son about dopamine gtt, son understands that dopamine is for his bradycardia and sinus pause. Discussed that if patient were transferred to Palliative Care Unit, there would be no escalation of dopamine to which son responds that if dopamine dose has been stable then he would like to keep dopamine at a fixed dose which will allow him to go to PCU to be able to visit patient. He would like to complete current antibiotic course for pneumonia as he doesn't want patient to become septic and worsen before he is able to visit.     Recommended to son that he should speak to primary team for today's medical updates and recommended for him to discuss with the rest of his family members including patient's wife about our discussions.  Son in agreement to continue goals of care discussion tomorrow.

## 2020-10-21 NOTE — CONSULT NOTE ADULT - PROBLEM SELECTOR RECOMMENDATION 2
-Noted to have fevers 10/17-10/18  -Sputum culture 10/18 +enterobacter, Hafnia alvei, staph aureus.   -BC NGTD  -C/w Cefepime -Noted to have fevers 10/17-10/18  -Sputum culture 10/18 +enterobacter, Hafnia alvei, staph aureus.   -BC NGTD  -C/w Cefepime  -Suction PRN

## 2020-10-21 NOTE — PROGRESS NOTE ADULT - SUBJECTIVE AND OBJECTIVE BOX
Subjective: Patient seen and examined. No new events except as noted.     SUBJECTIVE/ROS:  ROS is limited as pt currently sedated on ventilator.       MEDICATIONS:  MEDICATIONS  (STANDING):  albuterol/ipratropium for Nebulization 3 milliLiter(s) Nebulizer every 6 hours  BACItracin   Ointment 1 Application(s) Topical two times a day  chlorhexidine 0.12% Liquid 15 milliLiter(s) Oral Mucosa every 12 hours  chlorhexidine 4% Liquid 1 Application(s) Topical <User Schedule>  chlorhexidine 4% Liquid 1 Application(s) Topical <User Schedule>  dextrose 5%. 1000 milliLiter(s) (50 mL/Hr) IV Continuous <Continuous>  dextrose 50% Injectable 12.5 Gram(s) IV Push once  dextrose 50% Injectable 25 Gram(s) IV Push once  diphtheria/tetanus/pertussis (acellular) Vaccine (ADAcel) 0.5 milliLiter(s) IntraMuscular once  DOPamine Infusion 2 MICROgram(s)/kG/Min (6.08 mL/Hr) IV Continuous <Continuous>  enoxaparin Injectable 40 milliGRAM(s) SubCutaneous <User Schedule>  fentaNYL   Infusion... 0.5 MICROgram(s)/kG/Hr (2.03 mL/Hr) IV Continuous <Continuous>  influenza   Vaccine 0.5 milliLiter(s) IntraMuscular once  insulin lispro (ADMELOG) corrective regimen sliding scale.   SubCutaneous every 6 hours  insulin NPH human recombinant 16 Unit(s) SubCutaneous every 6 hours  pantoprazole  Injectable 40 milliGRAM(s) IV Push daily  piperacillin/tazobactam IVPB.. 3.375 Gram(s) IV Intermittent every 8 hours  polyethylene glycol 3350 17 Gram(s) Oral two times a day  senna 2 Tablet(s) Oral at bedtime  sodium chloride 3%  Inhalation 3 milliLiter(s) Inhalation every 6 hours      PHYSICAL EXAM:  T(C): 36.9 (10-21-20 @ 03:00), Max: 37 (10-20-20 @ 11:00)  HR: 52 (10-21-20 @ 06:45) (48 - 73)  BP: -- 115/50  RR: 18 (10-21-20 @ 06:45) (12 - 24)  SpO2: 99% (10-21-20 @ 06:45) (89% - 100%)  Wt(kg): --  I&O's Summary    20 Oct 2020 07:01  -  21 Oct 2020 07:00  --------------------------------------------------------  IN: 2929.5 mL / OUT: 1185 mL / NET: 1744.5 mL        Appearance: on vent 	  Cardiovascular: Normal S1 S2,    Murmur:   Neck: JVP limited to be evaluated   Respiratory: Lungs few rhonchi   Gastrointestinal:  Soft  Skin: normal   Neuro: limited as pt on vent    LABS/DATA:    CARDIAC MARKERS:                                8.6    13.37 )-----------( 271      ( 20 Oct 2020 22:46 )             26.1     10-20    151<H>  |  116<H>  |  30<H>  ----------------------------<  134<H>  3.9   |  27  |  0.76    Ca    8.5      20 Oct 2020 22:46  Phos  4.7     10-20  Mg     2.4     10-20      proBNP:   Lipid Profile:   HgA1c:   TSH:     TELE:  EKG:

## 2020-10-21 NOTE — PROGRESS NOTE ADULT - SUBJECTIVE AND OBJECTIVE BOX
SUMMARY:  54 year-old Cantonese-speaking man who fell >10 feet from ladder and was found unable to move arms or legs on 10/14/20. He was seen in the [facility] ED where he was a Level 1 Trauma. He was noted to have an PETR A cervical cord injury with MRI notable for C3-4 central disc herniation resulting in compression of the spinal cord associated with spinal cord edema from C3 through C4 as well as extensive prevertebral soft tissue swelling at C1 through C5 and C7-T3 with probable disruption of the anterior longitudinal ligament and interspinous ligamentous injury at C2-C6 for which he was taken for emergent C3-5 laminectomy with C2-6 fusion.    10/18: Extubated and reintubated due to hypoxia and hypercarbia  10/19: Bradycardic. Significant sinus pause of 45s which resolved prior to atropine (none given)    Overnight events: Afebrile. No further sinus pauses    ICU Vital Signs Last 24 Hrs  T(C): 36.9 (21 Oct 2020 03:00), Max: 37 (20 Oct 2020 11:00)  T(F): 98.4 (21 Oct 2020 03:00), Max: 98.6 (20 Oct 2020 11:00)  HR: 55 (21 Oct 2020 06:00) (48 - 73)  BP: --  BP(mean): --  ABP: 121/50 (21 Oct 2020 05:00) (95/46 - 162/67)  ABP(mean): 75 (21 Oct 2020 05:00) (-5 - 216)  RR: 16 (21 Oct 2020 05:00) (12 - 24)  SpO2: 100% (21 Oct 2020 06:00) (89% - 100%)      10-19-20 @ 07:01  -  10-20-20 @ 07:00  --------------------------------------------------------  IN: 2583.2 mL / OUT: 1490 mL / NET: 1093.2 mL    10-20-20 @ 07:01  -  10-21-20 @ 06:35  --------------------------------------------------------  IN: 2828.2 mL / OUT: 1075 mL / NET: 1753.2 mL      Mode: AC/ CMV (Assist Control/ Continuous Mandatory Ventilation), RR (machine): 16, TV (machine): 500, FiO2: 50, PEEP: 8, ITime: 1, MAP: 11, PIP: 29  acetaminophen    Suspension .. 650 milliGRAM(s) Oral every 6 hours PRN  albuterol/ipratropium for Nebulization 3 milliLiter(s) Nebulizer every 6 hours  BACItracin   Ointment 1 Application(s) Topical two times a day  chlorhexidine 0.12% Liquid 15 milliLiter(s) Oral Mucosa every 12 hours  chlorhexidine 4% Liquid 1 Application(s) Topical <User Schedule>  chlorhexidine 4% Liquid 1 Application(s) Topical <User Schedule>  dextrose 5%. 1000 milliLiter(s) (50 mL/Hr) IV Continuous <Continuous>  dextrose 50% Injectable 12.5 Gram(s) IV Push once  dextrose 50% Injectable 25 Gram(s) IV Push once  diphtheria/tetanus/pertussis (acellular) Vaccine (ADAcel) 0.5 milliLiter(s) IntraMuscular once  DOPamine Infusion 2 MICROgram(s)/kG/Min (6.08 mL/Hr) IV Continuous <Continuous>  enoxaparin Injectable 40 milliGRAM(s) SubCutaneous <User Schedule>  fentaNYL   Infusion... 0.5 MICROgram(s)/kG/Hr (2.03 mL/Hr) IV Continuous <Continuous>  glucagon  Injectable 1 milliGRAM(s) IntraMuscular once PRN  glucagon  Injectable 1 milliGRAM(s) IntraMuscular once PRN  influenza   Vaccine 0.5 milliLiter(s) IntraMuscular once  insulin lispro (ADMELOG) corrective regimen sliding scale.   SubCutaneous every 6 hours  insulin NPH human recombinant 16 Unit(s) SubCutaneous every 6 hours  ondansetron Injectable 4 milliGRAM(s) IV Push every 6 hours PRN  oxyCODONE    IR 5 milliGRAM(s) Oral every 4 hours PRN  oxyCODONE    IR 10 milliGRAM(s) Oral every 4 hours PRN  pantoprazole  Injectable 40 milliGRAM(s) IV Push daily  piperacillin/tazobactam IVPB.. 3.375 Gram(s) IV Intermittent every 8 hours  polyethylene glycol 3350 17 Gram(s) Oral two times a day  senna 2 Tablet(s) Oral at bedtime  sodium chloride 0.9% lock flush 10 milliLiter(s) IV Push every 1 hour PRN  sodium chloride 3%  Inhalation 3 milliLiter(s) Inhalation every 6 hours                            8.6    13.37 )-----------( 271      ( 20 Oct 2020 22:46 )             26.1     10-20    151<H>  |  116<H>  |  30<H>  ----------------------------<  134<H>  3.9   |  27  |  0.76    Ca    8.5      20 Oct 2020 22:46  Phos  4.7     10-20  Mg     2.4     10-20        ABG - ( 20 Oct 2020 17:57 )  pH, Arterial: 7.48  pH, Blood: x     /  pCO2: 41    /  pO2: 77    / HCO3: 30    / Base Excess: 6.1   /  SaO2: 96                    EXAMINATION:  Gen: intubated  HEENT: collar, perrla  CV: s1, s2   Resp: CTAB  Abd: soft nt/nd  Extrem: pulses +2 throughout  Neuro: EO spontaneously, follows commands with eyes/nods head x3 with choices, L deltoid 3/5, bicep 4-, tricep 1/5, HG 0/5  R deltoid 3/5, bicep 4-, tricep 1, HG 0/5.              SUMMARY:  54 year-old Cantonese-speaking man who fell >10 feet from ladder and was found unable to move arms or legs on 10/14/20. He was seen in the [facility] ED where he was a Level 1 Trauma. He was noted to have an PETR A cervical cord injury with MRI notable for C3-4 central disc herniation resulting in compression of the spinal cord associated with spinal cord edema from C3 through C4 as well as extensive prevertebral soft tissue swelling at C1 through C5 and C7-T3 with probable disruption of the anterior longitudinal ligament and interspinous ligamentous injury at C2-C6 for which he was taken for emergent C3-5 laminectomy with C2-6 fusion.    10/18: Extubated and reintubated due to hypoxia and hypercarbia  10/19: Bradycardic. Significant sinus pause of 45s which resolved prior to atropine (none given)    Overnight events: Afebrile.     ICU Vital Signs Last 24 Hrs  T(C): 36.9 (21 Oct 2020 03:00), Max: 37 (20 Oct 2020 11:00)  T(F): 98.4 (21 Oct 2020 03:00), Max: 98.6 (20 Oct 2020 11:00)  HR: 55 (21 Oct 2020 06:00) (48 - 73)  ABP: 121/50 (21 Oct 2020 05:00) (95/46 - 162/67)  ABP(mean): 75 (21 Oct 2020 05:00) (-5 - 216)  RR: 16 (21 Oct 2020 05:00) (12 - 24)  SpO2: 100% (21 Oct 2020 06:00) (89% - 100%)      10-19-20 @ 07:01  -  10-20-20 @ 07:00  --------------------------------------------------------  IN: 2583.2 mL / OUT: 1490 mL / NET: 1093.2 mL    10-20-20 @ 07:01  -  10-21-20 @ 06:35  --------------------------------------------------------  IN: 2828.2 mL / OUT: 1075 mL / NET: 1753.2 mL      Mode: AC/ CMV (Assist Control/ Continuous Mandatory Ventilation), RR (machine): 16, TV (machine): 500, FiO2: 50, PEEP: 8, ITime: 1, MAP: 11, PIP: 29  acetaminophen    Suspension .. 650 milliGRAM(s) Oral every 6 hours PRN  albuterol/ipratropium for Nebulization 3 milliLiter(s) Nebulizer every 6 hours  BACItracin   Ointment 1 Application(s) Topical two times a day  chlorhexidine 0.12% Liquid 15 milliLiter(s) Oral Mucosa every 12 hours  chlorhexidine 4% Liquid 1 Application(s) Topical <User Schedule>  chlorhexidine 4% Liquid 1 Application(s) Topical <User Schedule>  dextrose 5%. 1000 milliLiter(s) (50 mL/Hr) IV Continuous <Continuous>  dextrose 50% Injectable 12.5 Gram(s) IV Push once  dextrose 50% Injectable 25 Gram(s) IV Push once  diphtheria/tetanus/pertussis (acellular) Vaccine (ADAcel) 0.5 milliLiter(s) IntraMuscular once  DOPamine Infusion 2 MICROgram(s)/kG/Min (6.08 mL/Hr) IV Continuous <Continuous>  enoxaparin Injectable 40 milliGRAM(s) SubCutaneous <User Schedule>  fentaNYL   Infusion... 0.5 MICROgram(s)/kG/Hr (2.03 mL/Hr) IV Continuous <Continuous>  glucagon  Injectable 1 milliGRAM(s) IntraMuscular once PRN  glucagon  Injectable 1 milliGRAM(s) IntraMuscular once PRN  influenza   Vaccine 0.5 milliLiter(s) IntraMuscular once  insulin lispro (ADMELOG) corrective regimen sliding scale.   SubCutaneous every 6 hours  insulin NPH human recombinant 16 Unit(s) SubCutaneous every 6 hours  ondansetron Injectable 4 milliGRAM(s) IV Push every 6 hours PRN  oxyCODONE    IR 5 milliGRAM(s) Oral every 4 hours PRN  oxyCODONE    IR 10 milliGRAM(s) Oral every 4 hours PRN  pantoprazole  Injectable 40 milliGRAM(s) IV Push daily  piperacillin/tazobactam IVPB.. 3.375 Gram(s) IV Intermittent every 8 hours  polyethylene glycol 3350 17 Gram(s) Oral two times a day  senna 2 Tablet(s) Oral at bedtime  sodium chloride 0.9% lock flush 10 milliLiter(s) IV Push every 1 hour PRN  sodium chloride 3%  Inhalation 3 milliLiter(s) Inhalation every 6 hours                      8.6    13.37 )-----------( 271      ( 20 Oct 2020 22:46 )             26.1     10-20    151<H>  |  116<H>  |  30<H>  ----------------------------<  134<H>  3.9   |  27  |  0.76    Ca    8.5      20 Oct 2020 22:46  Phos  4.7     10-20  Mg     2.4     10-20    ABG - ( 20 Oct 2020 17:57 )  pH, Arterial: 7.48  pH, Blood: x     /  pCO2: 41    /  pO2: 77    / HCO3: 30    / Base Excess: 6.1   /  SaO2: 96          EXAMINATION:  Gen: Intubated  HEENT: Collar, perrla  CV: s1, s2   Resp: CTAB  Abd: Soft nt/nd  Extrem: Pulses +2 throughout  Neuro: EO spontaneously, follows commands with eyes/nods head x3 with choices, L deltoid 4/5, bicep 4-, tricep 1/5, HG 1/5  R deltoid 2/5, bicep 4-, tricep 1, HG 1/5.

## 2020-10-21 NOTE — CONSULT NOTE ADULT - ASSESSMENT
54 year-old Cantonese-speaking man who fell >10 feet from ladder and was found unable to move arms or legs on 10/14/20. He was seen in the [facility] ED where he was a Level 1 Trauma. He was noted to have an PETR A cervical cord injury with MRI notable for C3-4 central disc herniation resulting in compression of the spinal cord associated with spinal cord edema from C3 through C4 as well as extensive prevertebral soft tissue swelling at C1 through C5 and C7-T3 with probable disruption of the anterior longitudinal ligament and interspinous ligamentous injury at C2-C6 for which he was taken for emergent C3-5 laminectomy with C2-6 fusion.  Patient was Extubated and reintubated due to hypoxia and hypercarbia on 10/18. Found to have Bradycardia with Significant sinus pause of 45s.  Palliative Care consulted for goals of care 65 year-old Cantonese-speaking man who fell >10 feet from ladder and was found unable to move arms or legs on 10/14/20. He was seen in the [facility] ED where he was a Level 1 Trauma. He was noted to have an PETR A cervical cord injury with MRI notable for C3-4 central disc herniation resulting in compression of the spinal cord associated with spinal cord edema from C3 through C4 as well as extensive prevertebral soft tissue swelling at C1 through C5 and C7-T3 with probable disruption of the anterior longitudinal ligament and interspinous ligamentous injury at C2-C6 for which he was taken for emergent C3-5 laminectomy with C2-6 fusion.  Patient was Extubated and reintubated due to hypoxia and hypercarbia on 10/18. Found to have Bradycardia with Significant sinus pause of 45s.  Palliative Care consulted for goals of care

## 2020-10-21 NOTE — CONSULT NOTE ADULT - ATTENDING COMMENTS
seen and examined with Fellow.  I agree with [facility] & P, A & P.
Primary negative.  No acute trauma surgery intervention.  In OR with neurosurgery.
I have personally seen and examined this patient and agree with the above assessment and plan, which I have reviewed and edited where appropriate.   Patient with spinal cord injury resulting in devastating exam, quadriplegia and respiratory insufficiency.  Plan for compassionate extubation.  Patient may be transferred to PCU when bed available.  See GOC discussion above.   30    minutes for advanced care planning discussion separate and in addition to the e and m service provided.

## 2020-10-21 NOTE — CONSULT NOTE ADULT - PROBLEM SELECTOR RECOMMENDATION 5
- Will continue to follow for ongoing goals of care discussion - Emotional support provided to rogelio Alexnadre.  - Will continue to follow for ongoing goals of care discussion

## 2020-10-21 NOTE — CONSULT NOTE ADULT - PROBLEM SELECTOR RECOMMENDATION 9
- s/p C3-5 laminectomy with C2-6 fusion   - management as per neurosurgery team - s/p C3-5 laminectomy with C2-6 fusion   - management as per neurosurgery team, sp laminectomy/fixation

## 2020-10-21 NOTE — PROGRESS NOTE ADULT - ASSESSMENT
ASSESSMENT/PLAN: Traumatic spinal cord injury/PETR A cervical cord injury status post C3-5 laminectomy with C2-6 fusion, post-operative day 5    NEURO:  Neurochecks Q4  Pain control  Monitor drain output  spinal precautions  Sedation:  Fent gtt -->RASS neg 2  Activity: [] mobilize as tolerated [] Bedrest [x] PT [x] OT - splint B/L UE and LE [x] PMNR eval for SCI facility    PULM:  Pressure support as tolerated  May need tracheostomy given high cervical injury--d/w patient and family, however, pt does not seem amenable to this  Aspiration precautions  VAP bundle  Goal for extubation in next 24-48 hours to BIPAP    CV: Bradycardia, asystole, shock  Possible vasovagal etiology  MAP >65 mmHg. Levo gtt--> changed to dopamine  TTE- stable  Cardiology following  EP following no intervention at this time, likely vagal .       RENAL:  Fluids: SIADH. Resolved  Monitor electrolytes      GI:  Diet: Tube feeds  GI prophylaxis [x] not indicated [] PPI:  [] other:  Bowel regimen    ENDO:    NPH/MAHESH, increase  Goal euglycemia (-180)  A1c 6.3    HEME/ONC:  Screen for VTE- negative  High risk for VTE - may need IVCF  VTE prophylaxis: [x] SCDs  [x] high risk of DVT/PE on admission due to: trauma    ID:  Procal elevated  Continue zosyn total 7 days   Sputum cultures- enterobacter, hafnia. Gram negatives  Sensitivity pending    SOCIAL/FAMILY:  [x] awaiting     CODE STATUS:  [x] Full Code     DISPOSITION:  [x] ICU    [x] Patient is at high risk of neurologic deterioration/death due to: cord compression, acute respiratory failure, septic shock, cardiac arrest    MISC: L subclavian (10/15)   ASSESSMENT/PLAN: Traumatic spinal cord injury/PETR A cervical cord injury status post C3-5 laminectomy with C2-6 fusion, post-operative day 6    NEURO:  Neurochecks Q4  Pain control  Spinal precautions  Sedation:  Fent gtt for pain -->RASS 0 to neg 2  Activity: [] mobilize as tolerated [] Bedrest [x] PT [x] OT - splint B/L UE and LE [x] PMNR eval for SCI facility    PULM:  Pressure support as tolerated  May need tracheostomy given high cervical injury--d/w patient and family, however, pt does not seem amenable to trach  Aspiration precautions, VAP bundle.  Goal for extubation in next 24-48 hours to BIPAP  Pulm consult to optimize for extubation.   CXR, ABG    CV: Bradycardia, asystole, shock  Possible vasovagal etiology  MAP >65 mmHg. Levo gtt--> changed to dopamine. Continue  TTE- wnl  Cardiology following  EP following no intervention at this time, likely vagal .       RENAL:  Fluids: SIADH. Resolved  Monitor electrolytes      GI:  Diet: Tube feeds  GI prophylaxis [] not indicated [x] PPI:  [] other:  Bowel regimen    ENDO:    NPH/MAHESH, increase  Goal euglycemia (-180)  A1c 6.3    HEME/ONC:  LE dopplers: No DVTs.  VTE prophylaxis: [x] SCDs  [x] high risk of DVT/PE on admission due to: trauma    ID:  Procal elevated  Sputum cultures- enterobacter, hafnia.   Change to cefepime  Sensitivities reviewed    SOCIAL/FAMILY:  [x] awaiting     CODE STATUS:  [x] Full Code     DISPOSITION:  [x] ICU    [x] Patient is at high risk of neurologic deterioration/death due to: cord compression, acute respiratory failure, septic shock, cardiac arrest    MISC: L subclavian (10/15)

## 2020-10-21 NOTE — PROGRESS NOTE ADULT - ASSESSMENT
Asystole   Bradycardia  Hypotension     all likely due to cervical spine injury causing sympathetic dysfunction with unopposed high vagal tone   suggest   avoid any av sara blockers   avoid midodrine  cont Dopamine for presser and low HR for now   atropine PRN     anemia  Monitor hemoglobin, transfuse as needed.     fu GOC discussion by NSCU

## 2020-10-21 NOTE — CONSULT NOTE ADULT - PROBLEM SELECTOR RECOMMENDATION 2
- Extubated on 10/18 and reintubated within few hours due to hypoxia and hypercarbia   - On mechanical ventilation with CPAP trial ongoing during encounter  - When presented with the options of tracheostomy, patient nodded his head NO adamantly, which was also confirmed by rogelio Alexandre  - Plan for compassionate extubation

## 2020-10-21 NOTE — CONSULT NOTE ADULT - SUBJECTIVE AND OBJECTIVE BOX
PULMONARY CONSULT    HPI: 64 y/o M with no significant PMH. Presents to ED s/p fall from ladder - was laying on floor for about 1 hour unable to move upper or lower extremities. S/p OR 10/15 C3-C5 laminectomy, C2-C6 fusion. Extubated 5/18 and was re-intubated about 2 hrs later for hypercarbic respiratory failure. Course further c/b PNA - sputum culture 5/18 with enterobacter, Hafnia alvei, and staph aureus.     PAST MEDICAL & SURGICAL HISTORY:  No pertinent past medical history  No significant past surgical history    Allergies  No Known Allergies    FAMILY HISTORY: non contributory     Social history:     Review of Systems:  CONSTITUTIONAL: No fever, chills, or fatigue  EYES: No eye pain, visual disturbances, or discharge  ENMT:  No difficulty hearing, tinnitus, vertigo; No sinus or throat pain  NECK: No pain or stiffness  RESPIRATORY: Per above  CARDIOVASCULAR: No chest pain, palpitations, dizziness, or leg swelling  GASTROINTESTINAL: No abdominal or epigastric pain. No nausea, vomiting, or hematemesis; No diarrhea or constipation. No melena or hematochezia.  GENITOURINARY: No dysuria, frequency, hematuria, or incontinence  NEUROLOGICAL: No headaches, memory loss, loss of strength, numbness, or tremors  SKIN: No itching, burning, rashes, or lesions   MUSCULOSKELETAL: No joint pain or swelling; No muscle, back, or extremity pain  PSYCHIATRIC: No depression, anxiety, mood swings, or difficulty sleeping    Medications:  MEDICATIONS  (STANDING):  albuterol/ipratropium for Nebulization 3 milliLiter(s) Nebulizer every 6 hours  ascorbic acid 500 milliGRAM(s) Oral daily  BACItracin   Ointment 1 Application(s) Topical two times a day  cefepime   IVPB 2000 milliGRAM(s) IV Intermittent every 8 hours  chlorhexidine 0.12% Liquid 15 milliLiter(s) Oral Mucosa every 12 hours  chlorhexidine 4% Liquid 1 Application(s) Topical <User Schedule>  dextrose 5%. 1000 milliLiter(s) (50 mL/Hr) IV Continuous <Continuous>  dextrose 50% Injectable 12.5 Gram(s) IV Push once  dextrose 50% Injectable 25 Gram(s) IV Push once  diphtheria/tetanus/pertussis (acellular) Vaccine (ADAcel) 0.5 milliLiter(s) IntraMuscular once  DOPamine Infusion 2 MICROgram(s)/kG/Min (6.08 mL/Hr) IV Continuous <Continuous>  enoxaparin Injectable 40 milliGRAM(s) SubCutaneous <User Schedule>  fentaNYL   Infusion... 0.5 MICROgram(s)/kG/Hr (2.03 mL/Hr) IV Continuous <Continuous>  ferrous    sulfate 325 milliGRAM(s) Oral daily  influenza   Vaccine 0.5 milliLiter(s) IntraMuscular once  insulin lispro (ADMELOG) corrective regimen sliding scale.   SubCutaneous every 6 hours  insulin NPH human recombinant 16 Unit(s) SubCutaneous every 6 hours  multivitamin 1 Tablet(s) Oral daily  pantoprazole  Injectable 40 milliGRAM(s) IV Push daily  polyethylene glycol 3350 17 Gram(s) Oral two times a day  senna 2 Tablet(s) Oral at bedtime  sodium chloride 3%  Inhalation 3 milliLiter(s) Inhalation every 6 hours    MEDICATIONS  (PRN):  acetaminophen    Suspension .. 650 milliGRAM(s) Oral every 6 hours PRN Temp greater or equal to 38C (100.4F), Mild Pain (1 - 3)  glucagon  Injectable 1 milliGRAM(s) IntraMuscular once PRN Glucose LESS THAN 70 milligrams/deciliter  glucagon  Injectable 1 milliGRAM(s) IntraMuscular once PRN Glucose LESS THAN 70 milligrams/deciliter  ondansetron Injectable 4 milliGRAM(s) IV Push every 6 hours PRN Nausea and/or Vomiting  oxyCODONE    IR 5 milliGRAM(s) Oral every 4 hours PRN Moderate Pain (4 - 6)  oxyCODONE    IR 10 milliGRAM(s) Oral every 4 hours PRN Severe Pain (7 - 10)  sodium chloride 0.9% lock flush 10 milliLiter(s) IV Push every 1 hour PRN Pre/post blood products, medications, blood draw, and to maintain line patency    Mode: CPAP with PS  FiO2: 50  PEEP: 5  PS: 10  MAP: 7    Vital Signs Last 24 Hrs  T(C): 37.2 (21 Oct 2020 11:00), Max: 37.3 (21 Oct 2020 07:00)  T(F): 99 (21 Oct 2020 11:00), Max: 99.1 (21 Oct 2020 07:00)  HR: 64 (21 Oct 2020 11:37) (48 - 73)  BP: --  BP(mean): --  RR: 15 (21 Oct 2020 11:15) (12 - 24)  SpO2: 100% (21 Oct 2020 11:37) (89% - 100%)    ABG - ( 20 Oct 2020 17:57 )  pH, Arterial: 7.48  pH, Blood: x     /  pCO2: 41    /  pO2: 77    / HCO3: 30    / Base Excess: 6.1   /  SaO2: 96        10-20 @ 07:01  -  10-21 @ 07:00  --------------------------------------------------------  IN: 2989.5 mL / OUT: 1185 mL / NET: 1804.5 mL    LABS:                        8.6    13.37 )-----------( 271      ( 20 Oct 2020 22:46 )             26.1     10-20    151<H>  |  116<H>  |  30<H>  ----------------------------<  134<H>  3.9   |  27  |  0.76    Ca    8.5      20 Oct 2020 22:46  Phos  4.7     10-20  Mg     2.4     10-20          CAPILLARY BLOOD GLUCOSE      POCT Blood Glucose.: 171 mg/dL (21 Oct 2020 11:07)    Procalcitonin, Serum: 1.66 ng/mL (10-19-20 @ 05:21)    CULTURES:      (collected 10-17-20 @ 23:10)  Source: .Blood Blood-Arterial  Preliminary Report (10-19-20 @ 01:01):    No growth to date.     (collected 10-17-20 @ 23:10)  Source: .Blood Blood-Arterial  Preliminary Report (10-19-20 @ 01:01):    No growth to date.      Culture - Sputum (collected 10-18-20 @ 06:30)  Source: .Sputum Sputum  Gram Stain (10-19-20 @ 12:51):    Few polymorphonuclear leukocytes per low power field    Moderate Squamous epithelial cells per low power field    Few Gram Negative Rods seen per oil power field    Rare Gram Positive Cocci in Clusters seen per oil power field    Rare Gram Negative Diplococci seen per oil power field    Results consistent with oropharyngeal contamination  Final Report (10-21-20 @ 08:32):    Moderate Enterobacter aerogenes    Moderate Hafnia alvei    Moderate Staphylococcus aureus    Normal Respiratory Seema present  Organism: Enterobacter aerogenes  Erum ford  Staphylococcus aureus (10-21-20 @ 08:32)  Organism: Staphylococcus aureus (10-21-20 @ 08:32)      -  Ampicillin/Sulbactam: S <=8/4      -  Cefazolin: S <=4      -  Clindamycin: S 0.5      -  Erythromycin: S <=0.25      -  Gentamicin: S <=1 Should not be used as monotherapy      -  Oxacillin: S 1      -  Penicillin: R >8      -  RIF- Rifampin: I 2 Should not be used as monotherapy      -  Tetra/Doxy: S <=1      -  Trimethoprim/Sulfamethoxazole: S <=0.5/9.5      -  Vancomycin: S 2      Method Type: ALBERT  Organism: Erum ford (10-21-20 @ 08:32)      -  Amikacin: S <=16      -  Amoxicillin/Clavulanic Acid: R >16/8      -  Ampicillin: R >16 These ampicillin results predict results for amoxicillin      -  Ampicillin/Sulbactam: R >16/8 Enterobacter, Citrobacter, and Serratia may develop resistance during prolonged therapy (3-4 days)      -  Aztreonam: S <=4      -  Cefazolin: R >16 Enterobacter, Citrobacter, and Serratia may develop resistance during prolonged therapy (3-4 days)      -  Cefepime: S <=2      -  Cefoxitin: R <=8      -  Ceftriaxone: R >32 Enterobacter, Citrobacter, and Serratia may develop resistance during prolonged therapy      -  Ciprofloxacin: S <=0.25      -  Ertapenem: S <=0.5      -  Gentamicin: S <=2      -  Levofloxacin: S <=0.5      -  Meropenem: S <=1      -  Piperacillin/Tazobactam: I 64      -  Tobramycin: S <=2      -  Trimethoprim/Sulfamethoxazole: S <=0.5/9.5      Method Type: ALBERT  Organism: Enterobacter aerogenes (10-21-20 @ 08:32)      -  Amikacin: S <=16      -  Amoxicillin/Clavulanic Acid: R <=8/4      -  Ampicillin: R <=8 These ampicillin results predict results for amoxicillin      -  Ampicillin/Sulbactam: R <=4/2 Enterobacter, Citrobacter, and Serratia may develop resistance during prolonged therapy (3-4 days)      -  Aztreonam: S <=4      -  Cefazolin: R <=2 Enterobacter, Citrobacter, and Serratia may develop resistance during prolonged therapy (3-4 days)      -  Cefepime: S <=2      -  Cefoxitin: R >16      -  Ceftriaxone: S <=1 Enterobacter, Citrobacter, and Serratia may develop resistance during prolonged therapy      -  Ciprofloxacin: S <=0.25      -  Ertapenem: S <=0.5      -  Gentamicin: S <=2      -  Imipenem: S <=1      -  Levofloxacin: S <=0.5      -  Meropenem: S <=1      -  Piperacillin/Tazobactam: S <=8      -  Tobramycin: S <=2      -  Trimethoprim/Sulfamethoxazole: S <=0.5/9.5      Method Type: ALBERT    Physical Examination:    General: No acute distress.      HEENT: Pupils equal, reactive to light.  Symmetric.    PULM: Clear to auscultation bilaterally, no significant sputum production    CVS: RRR    ABD: Soft, nondistended, nontender, normoactive bowel sounds, no masses    EXT: No edema, nontender    SKIN: Warm and well perfused, no rashes noted.    NEURO: Alert, oriented, interactive, nonfocal      RADIOLOGY REVIEWED  CXR 10/21: R pl effusion    PULMONARY CONSULT    HPI: 64 y/o M with no significant PMH. Presents to ED s/p fall from ladder - was laying on floor for about 1 hour unable to move upper or lower extremities. S/p OR 10/15 C3-C5 laminectomy, C2-C6 fusion. Extubated 5/18 and was re-intubated about 2 hrs later for hypercarbic respiratory failure. Course further c/b PNA - sputum culture 5/18 with enterobacter, Hafnia alvei, and staph aureus - now on abx. Currently on PS trials 5/10. ROS limited - pt intubated.     PAST MEDICAL & SURGICAL HISTORY:  No pertinent past medical history  No significant past surgical history    Allergies  No Known Allergies    FAMILY HISTORY: non contributory     Social history: unknown     Review of Systems: limited, pt intubated   CONSTITUTIONAL: Per above   NECK:  Per above   RESPIRATORY: Per above  CARDIOVASCULAR: No chest pain, palpitations, dizziness, or leg swelling  GASTROINTESTINAL: No abdominal or epigastric pain. No nausea, vomiting, or hematemesis; No diarrhea or constipation. No melena or hematochezia.  GENITOURINARY: No dysuria, frequency, hematuria, or incontinence  NEUROLOGICAL: No headaches, memory loss, loss of strength, numbness, or tremors  SKIN: No itching, burning, rashes, or lesions   MUSCULOSKELETAL: Per above    Medications:  MEDICATIONS  (STANDING):  albuterol/ipratropium for Nebulization 3 milliLiter(s) Nebulizer every 6 hours  ascorbic acid 500 milliGRAM(s) Oral daily  BACItracin   Ointment 1 Application(s) Topical two times a day  cefepime   IVPB 2000 milliGRAM(s) IV Intermittent every 8 hours  chlorhexidine 0.12% Liquid 15 milliLiter(s) Oral Mucosa every 12 hours  chlorhexidine 4% Liquid 1 Application(s) Topical <User Schedule>  dextrose 5%. 1000 milliLiter(s) (50 mL/Hr) IV Continuous <Continuous>  dextrose 50% Injectable 12.5 Gram(s) IV Push once  dextrose 50% Injectable 25 Gram(s) IV Push once  diphtheria/tetanus/pertussis (acellular) Vaccine (ADAcel) 0.5 milliLiter(s) IntraMuscular once  DOPamine Infusion 2 MICROgram(s)/kG/Min (6.08 mL/Hr) IV Continuous <Continuous>  enoxaparin Injectable 40 milliGRAM(s) SubCutaneous <User Schedule>  fentaNYL   Infusion... 0.5 MICROgram(s)/kG/Hr (2.03 mL/Hr) IV Continuous <Continuous>  ferrous    sulfate 325 milliGRAM(s) Oral daily  influenza   Vaccine 0.5 milliLiter(s) IntraMuscular once  insulin lispro (ADMELOG) corrective regimen sliding scale.   SubCutaneous every 6 hours  insulin NPH human recombinant 16 Unit(s) SubCutaneous every 6 hours  multivitamin 1 Tablet(s) Oral daily  pantoprazole  Injectable 40 milliGRAM(s) IV Push daily  polyethylene glycol 3350 17 Gram(s) Oral two times a day  senna 2 Tablet(s) Oral at bedtime  sodium chloride 3%  Inhalation 3 milliLiter(s) Inhalation every 6 hours    MEDICATIONS  (PRN):  acetaminophen    Suspension .. 650 milliGRAM(s) Oral every 6 hours PRN Temp greater or equal to 38C (100.4F), Mild Pain (1 - 3)  glucagon  Injectable 1 milliGRAM(s) IntraMuscular once PRN Glucose LESS THAN 70 milligrams/deciliter  glucagon  Injectable 1 milliGRAM(s) IntraMuscular once PRN Glucose LESS THAN 70 milligrams/deciliter  ondansetron Injectable 4 milliGRAM(s) IV Push every 6 hours PRN Nausea and/or Vomiting  oxyCODONE    IR 5 milliGRAM(s) Oral every 4 hours PRN Moderate Pain (4 - 6)  oxyCODONE    IR 10 milliGRAM(s) Oral every 4 hours PRN Severe Pain (7 - 10)  sodium chloride 0.9% lock flush 10 milliLiter(s) IV Push every 1 hour PRN Pre/post blood products, medications, blood draw, and to maintain line patency    Mode: CPAP with PS  FiO2: 50  PEEP: 5  PS: 10  MAP: 7    Vital Signs Last 24 Hrs  T(C): 37.2 (21 Oct 2020 11:00), Max: 37.3 (21 Oct 2020 07:00)  T(F): 99 (21 Oct 2020 11:00), Max: 99.1 (21 Oct 2020 07:00)  HR: 64 (21 Oct 2020 11:37) (48 - 73)  BP: --  BP(mean): --  RR: 15 (21 Oct 2020 11:15) (12 - 24)  SpO2: 100% (21 Oct 2020 11:37) (89% - 100%)    ABG - ( 20 Oct 2020 17:57 )  pH, Arterial: 7.48  pH, Blood: x     /  pCO2: 41    /  pO2: 77    / HCO3: 30    / Base Excess: 6.1   /  SaO2: 96        10-20 @ 07:01  -  10-21 @ 07:00  --------------------------------------------------------  IN: 2989.5 mL / OUT: 1185 mL / NET: 1804.5 mL    LABS:                        8.6    13.37 )-----------( 271      ( 20 Oct 2020 22:46 )             26.1     10-20    151<H>  |  116<H>  |  30<H>  ----------------------------<  134<H>  3.9   |  27  |  0.76    Ca    8.5      20 Oct 2020 22:46  Phos  4.7     10-20  Mg     2.4     10-20          CAPILLARY BLOOD GLUCOSE      POCT Blood Glucose.: 171 mg/dL (21 Oct 2020 11:07)    Procalcitonin, Serum: 1.66 ng/mL (10-19-20 @ 05:21)    CULTURES:      (collected 10-17-20 @ 23:10)  Source: .Blood Blood-Arterial  Preliminary Report (10-19-20 @ 01:01):    No growth to date.     (collected 10-17-20 @ 23:10)  Source: .Blood Blood-Arterial  Preliminary Report (10-19-20 @ 01:01):    No growth to date.      Culture - Sputum (collected 10-18-20 @ 06:30)  Source: .Sputum Sputum  Gram Stain (10-19-20 @ 12:51):    Few polymorphonuclear leukocytes per low power field    Moderate Squamous epithelial cells per low power field    Few Gram Negative Rods seen per oil power field    Rare Gram Positive Cocci in Clusters seen per oil power field    Rare Gram Negative Diplococci seen per oil power field    Results consistent with oropharyngeal contamination  Final Report (10-21-20 @ 08:32):    Moderate Enterobacter aerogenes    Moderate Hafnia alvei    Moderate Staphylococcus aureus    Normal Respiratory Seema present  Organism: Enterobacter aerogenes  Hafnia alvei  Staphylococcus aureus (10-21-20 @ 08:32)  Organism: Staphylococcus aureus (10-21-20 @ 08:32)      -  Ampicillin/Sulbactam: S <=8/4      -  Cefazolin: S <=4      -  Clindamycin: S 0.5      -  Erythromycin: S <=0.25      -  Gentamicin: S <=1 Should not be used as monotherapy      -  Oxacillin: S 1      -  Penicillin: R >8      -  RIF- Rifampin: I 2 Should not be used as monotherapy      -  Tetra/Doxy: S <=1      -  Trimethoprim/Sulfamethoxazole: S <=0.5/9.5      -  Vancomycin: S 2      Method Type: ALBERT  Organism: Erum ford (10-21-20 @ 08:32)      -  Amikacin: S <=16      -  Amoxicillin/Clavulanic Acid: R >16/8      -  Ampicillin: R >16 These ampicillin results predict results for amoxicillin      -  Ampicillin/Sulbactam: R >16/8 Enterobacter, Citrobacter, and Serratia may develop resistance during prolonged therapy (3-4 days)      -  Aztreonam: S <=4      -  Cefazolin: R >16 Enterobacter, Citrobacter, and Serratia may develop resistance during prolonged therapy (3-4 days)      -  Cefepime: S <=2      -  Cefoxitin: R <=8      -  Ceftriaxone: R >32 Enterobacter, Citrobacter, and Serratia may develop resistance during prolonged therapy      -  Ciprofloxacin: S <=0.25      -  Ertapenem: S <=0.5      -  Gentamicin: S <=2      -  Levofloxacin: S <=0.5      -  Meropenem: S <=1      -  Piperacillin/Tazobactam: I 64      -  Tobramycin: S <=2      -  Trimethoprim/Sulfamethoxazole: S <=0.5/9.5      Method Type: ALBERT  Organism: Enterobacter aerogenes (10-21-20 @ 08:32)      -  Amikacin: S <=16      -  Amoxicillin/Clavulanic Acid: R <=8/4      -  Ampicillin: R <=8 These ampicillin results predict results for amoxicillin      -  Ampicillin/Sulbactam: R <=4/2 Enterobacter, Citrobacter, and Serratia may develop resistance during prolonged therapy (3-4 days)      -  Aztreonam: S <=4      -  Cefazolin: R <=2 Enterobacter, Citrobacter, and Serratia may develop resistance during prolonged therapy (3-4 days)      -  Cefepime: S <=2      -  Cefoxitin: R >16      -  Ceftriaxone: S <=1 Enterobacter, Citrobacter, and Serratia may develop resistance during prolonged therapy      -  Ciprofloxacin: S <=0.25      -  Ertapenem: S <=0.5      -  Gentamicin: S <=2      -  Imipenem: S <=1      -  Levofloxacin: S <=0.5      -  Meropenem: S <=1      -  Piperacillin/Tazobactam: S <=8      -  Tobramycin: S <=2      -  Trimethoprim/Sulfamethoxazole: S <=0.5/9.5      Method Type: ALBERT    Physical Examination:    General: No acute distress.      HEENT: Pupils equal, reactive to light.  Symmetric.    PULM: Clear anteriorly.     CVS: RRR    ABD: Soft, nondistended, nontender, normoactive bowel sounds, no masses    EXT: No edema, nontender    SKIN: Warm and well perfused, no rashes noted.    NEURO: Intubated, no movement lower extremities       RADIOLOGY REVIEWED  CXR 10/21: R pl effusion

## 2020-10-21 NOTE — CONSULT NOTE ADULT - PROBLEM SELECTOR RECOMMENDATION 3
s/p C3-C5 laminectomy, C2-C6 fusion  -As per neurosurgery
Patient requires total support for all ADL's.  PPSV2 10   %.

## 2020-10-21 NOTE — CONSULT NOTE ADULT - PROBLEM SELECTOR RECOMMENDATION 4
- likely due to sympathetic dysfunction with unopposed high vagal tone   - cardiology recommendations appreciated  - currently on dopamine infusion

## 2020-10-21 NOTE — CONSULT NOTE ADULT - ASSESSMENT
64 y/o M with no significant PMH. Presents to ED s/p fall from ladder - was laying on floor for about 1 hour unable to move upper or lower extremities. S/p OR 10/15 C3-C5 laminectomy, C2-C6 fusion. Extubated 5/18 and was re-intubated about 2 hrs later for hypercarbic respiratory failure. Course further c/b PNA - sputum culture 5/18 with enterobacter, Hafnia alvei, and staph aureus - now on abx. Currently on PS trials 5/10, sats 100% 50% FiO2.

## 2020-10-21 NOTE — CONSULT NOTE ADULT - SUBJECTIVE AND OBJECTIVE BOX
HPI:  · HPI Objective Statement: 53yo M pmhx DM BIBEMS s/p fall from ladder >10 ft, was laying on floor about 1 hr, unable to move upper or lower extremities. Awake, protecting airway, answers questions appropriately. Denies anticoagulant use.   (15 Oct 2020 05:14)    PERTINENT PM/SXH:   No pertinent past medical history      No significant past surgical history      FAMILY HISTORY:    ITEMS NOT CHECKED ARE NOT PRESENT    SOCIAL HISTORY:   Significant other/partner[ ]  Children[ ]  Latter day/Spirituality:  Substance hx:  [ ]   Tobacco hx:  [ ]   Alcohol hx: [ ]   Home Opioid hx:  [ ] I-Stop Reference No:  Living Situation: [ ]Home  [ ]Long term care  [ ]Rehab [ ]Other    ADVANCE DIRECTIVES:    DNR  Yes    MOLST  [ ]  Living Will  [ ]   DECISION MAKER(s):  [ ] Health Care Proxy(s)  [ ] Surrogate(s)  [ ] Guardian           Name(s): Phone Number(s):  Son Baldomero  [phone]  Wife Aliya  [phone]    BASELINE (I)ADL(s) (prior to admission):  [place]: [x ]Total  [ ] Moderate [ ]Dependent    Allergies    No Known Allergies    Intolerances    MEDICATIONS  (STANDING):  albuterol/ipratropium for Nebulization 3 milliLiter(s) Nebulizer every 6 hours  ascorbic acid 500 milliGRAM(s) Oral daily  BACItracin   Ointment 1 Application(s) Topical two times a day  cefepime   IVPB 2000 milliGRAM(s) IV Intermittent every 8 hours  chlorhexidine 0.12% Liquid 15 milliLiter(s) Oral Mucosa every 12 hours  chlorhexidine 4% Liquid 1 Application(s) Topical <User Schedule>  dextrose 5%. 1000 milliLiter(s) (50 mL/Hr) IV Continuous <Continuous>  dextrose 50% Injectable 12.5 Gram(s) IV Push once  dextrose 50% Injectable 25 Gram(s) IV Push once  diphtheria/tetanus/pertussis (acellular) Vaccine (ADAcel) 0.5 milliLiter(s) IntraMuscular once  DOPamine Infusion 2 MICROgram(s)/kG/Min (6.08 mL/Hr) IV Continuous <Continuous>  enoxaparin Injectable 40 milliGRAM(s) SubCutaneous <User Schedule>  fentaNYL   Infusion... 0.5 MICROgram(s)/kG/Hr (2.03 mL/Hr) IV Continuous <Continuous>  ferrous    sulfate 325 milliGRAM(s) Oral daily  influenza   Vaccine 0.5 milliLiter(s) IntraMuscular once  insulin lispro (ADMELOG) corrective regimen sliding scale.   SubCutaneous every 6 hours  insulin NPH human recombinant 16 Unit(s) SubCutaneous every 6 hours  multivitamin 1 Tablet(s) Oral daily  pantoprazole  Injectable 40 milliGRAM(s) IV Push daily  polyethylene glycol 3350 17 Gram(s) Oral two times a day  senna 2 Tablet(s) Oral at bedtime  sodium chloride 3%  Inhalation 3 milliLiter(s) Inhalation every 6 hours    MEDICATIONS  (PRN):  acetaminophen    Suspension .. 650 milliGRAM(s) Oral every 6 hours PRN Temp greater or equal to 38C (100.4F), Mild Pain (1 - 3)  glucagon  Injectable 1 milliGRAM(s) IntraMuscular once PRN Glucose LESS THAN 70 milligrams/deciliter  glucagon  Injectable 1 milliGRAM(s) IntraMuscular once PRN Glucose LESS THAN 70 milligrams/deciliter  ondansetron Injectable 4 milliGRAM(s) IV Push every 6 hours PRN Nausea and/or Vomiting  oxyCODONE    IR 5 milliGRAM(s) Oral every 4 hours PRN Moderate Pain (4 - 6)  oxyCODONE    IR 10 milliGRAM(s) Oral every 4 hours PRN Severe Pain (7 - 10)  sodium chloride 0.9% lock flush 10 milliLiter(s) IV Push every 1 hour PRN Pre/post blood products, medications, blood draw, and to maintain line patency    PRESENT SYMPTOMS: [ ]Unable to obtain due to poor mentation   Source if other than patient:  [ ]Family   [ ]Team     Pain: [ ]yes [ ]no  QOL impact -   Location -                    Aggravating factors -  Quality -  Radiation -  Timing-  Severity (0-10 scale):  Minimal acceptable level (0-10 scale):     CPOT:    https://www.sccm.org/getattachment/[email or web address]/Critical-Care-Pain-Observation-Tool-(CPOT)      PAIN AD Score:     [email or web address] (press ctrl +  left click to view)    Dyspnea:                           [ ]Mild [ ]Moderate [ ]Severe  Anxiety:                             [ ]Mild [ ]Moderate [ ]Severe  Fatigue:                             [ ]Mild [ ]Moderate [ ]Severe  Nausea:                             [ ]Mild [ ]Moderate [ ]Severe  Loss of appetite:              [ ]Mild [ ]Moderate [ ]Severe  Constipation:                    [ ]Mild [ ]Moderate [ ]Severe    Other Symptoms:  [ ]All other review of systems negative     Palliative Performance Status Version 2:         %    [email or web address]  PHYSICAL EXAM:  Vital Signs Last 24 Hrs  T(C): 37.2 (21 Oct 2020 11:00), Max: 37.3 (21 Oct 2020 07:00)  T(F): 99 (21 Oct 2020 11:00), Max: 99.1 (21 Oct 2020 07:00)  HR: 64 (21 Oct 2020 13:00) (48 - 73)  BP: --  BP(mean): --  RR: 21 (21 Oct 2020 13:00) (14 - 24)  SpO2: 100% (21 Oct 2020 13:00) (89% - 100%) I&O's Summary    20 Oct 2020 07:01  -  21 Oct 2020 07:00  --------------------------------------------------------  IN: 2989.5 mL / OUT: 1185 mL / NET: 1804.5 mL    21 Oct 2020 07:01  -  21 Oct 2020 13:38  --------------------------------------------------------  IN: 678.9 mL / OUT: 450 mL / NET: 228.9 mL      GENERAL:  [ ]Alert  [ ]Oriented x   [ ]Lethargic  [ ]Cachexia  [ ]Unarousable  [ ]Verbal  [ ]Non-Verbal  Behavioral:   [ ] Anxiety  [ ] Delirium [ ] Agitation [ ] Other  HEENT:  [ ]Normal   [ ]Dry mouth   [ ]ET Tube/Trach  [ ]Oral lesions  PULMONARY:   [ ]Clear [ ]Tachypnea  [ ]Audible excessive secretions   [ ]Rhonchi        [ ]Right [ ]Left [ ]Bilateral  [ ]Crackles        [ ]Right [ ]Left [ ]Bilateral  [ ]Wheezing     [ ]Right [ ]Left [ ]Bilateral  [ ]Diminished breath sounds [ ]right [ ]left [ ]bilateral  CARDIOVASCULAR:    [ ]Regular [ ]Irregular [ ]Tachy  [ ]Simon [ ]Murmur [ ]Other  GASTROINTESTINAL:  [ ]Soft  [ ]Distended   [ ]+BS  [ ]Non tender [ ]Tender  [ ]PEG [ ]OGT/ NGT  Last BM:     GENITOURINARY:  [ ]Normal [ ] Incontinent   [ ]Oliguria/Anuria   [ ]Cardenas  MUSCULOSKELETAL:   [ ]Normal   [ ]Weakness  [ ]Bed/Wheelchair bound [ ]Edema  NEUROLOGIC:   [ ]No focal deficits  [ ]Cognitive impairment  [ ]Dysphagia [ ]Dysarthria [ ]Paresis [ ]Other   SKIN:   [ ]Normal    [ ]Rash  [ ]Pressure ulcer(s)       Present on admission [ ]y [ ]n    CRITICAL CARE:  [ ] Shock Present  [ ]Septic [ ]Cardiogenic [ ]Neurologic [ ]Hypovolemic  [ ]  Vasopressors [ ]  Inotropes   [ ]Respiratory failure present [ ]Mechanical ventilation [ ]Non-invasive ventilatory support [ ]High flow  Mode: CPAP with PS, FiO2: 50, PEEP: 5, PS: 10, MAP: 7  [ ]Acute  [ ]Chronic [ ]Hypoxic  [ ]Hypercarbic [ ]Other  [ ]Other organ failure     LABS:                        8.6    13.37 )-----------( 271      ( 20 Oct 2020 22:46 )             26.1   10-20    151<H>  |  116<H>  |  30<H>  ----------------------------<  134<H>  3.9   |  27  |  0.76    Ca    8.5      20 Oct 2020 22:46  Phos  4.7     10-20  Mg     2.4     10-20          RADIOLOGY & ADDITIONAL STUDIES:  < from: MR Cervical Spine No Cont (10.14.20 @ 20:25) >  Impression:    Cervical spine MRI: C3-4 central disc herniation results in compression of the spinal cord with associated spinal cord edema from C3 through C4. Extensive prevertebral soft tissue swelling at C1 through C5 and at C7-T3 with probable disruption of the anterior longitudinal ligament. Interspinous ligamentous injury at C2-C6.    Thoracic spine MRI: No evidence for spinal cord compression.    The results of this examination were discussed with Dr. Ackerman by Dr. Abdalla at 8:35 PM on 10/14/2020      SHERITA CHRISTIANSON M.D., ATTENDING RADIOLOGIST  This document has been electronically signed. Oct 14 2020  8:41PM    < end of copied text >  < from: Xray Chest 1 View- PORTABLE-Routine (Xray Chest 1 View- PORTABLE-Routine in AM.) (10.21.20 @ 04:44) >    Impression:    The heart is normal in size. Right pleural effusion. The left lung is clear. Endotracheal tube is in the level of the clavicle. NG tube is in the stomach however its tip is not seen on the current study. A central line is seen on the left and the tip is in the superior vena cava. No pneumothorax.    FUAD RAMOS M.D., ATTENDING RADIOLOGIST  This document has been electronically signed. Oct 21 2020  9:10AM    < end of copied text >      PROTEIN CALORIE MALNUTRITION PRESENT: [ ]mild [ ]moderate [ ]severe [ ]underweight [ ]morbid obesity  https://www.andeal.org/vault/2440/web/files/ONC/Table_Clinical%20Characteristics%20to%20Document%20Malnutrition-White%20JV%20et%20al%261654.pdf    Height (cm): 170.2 (10-16-20 @ 16:15)  Weight (kg): 81 (10-14-20 @ 20:25)  BMI (kg/m2): 28 (10-16-20 @ 16:15)    [ ]PPSV2 < or = to 30% [ ]significant weight loss  [ ]poor nutritional intake  [ ]anasarca     Albumin, Serum: 2.6 g/dL (10-19-20 @ 05:21)   [ ]Artificial Nutrition      REFERRALS:   [ ]Chaplaincy  [ ]Hospice  [ ]Child Life  [ ]Social Work  [ ]Case management [ ]Holistic Therapy     Goals of Care Document: HPI:  · HPI Objective Statement: 53yo M pmhx DM BIBEMS s/p fall from ladder >10 ft, was laying on floor about 1 hr, unable to move upper or lower extremities. Awake, protecting airway, answers questions appropriately. Denies anticoagulant use.   (15 Oct 2020 05:14)    PERTINENT PM/SXH:   No pertinent past medical history      No significant past surgical history      FAMILY HISTORY:    ITEMS NOT CHECKED ARE NOT PRESENT    SOCIAL HISTORY:   Significant other/partner[x ]  Children[ x]  Mandaen/Spirituality:  Substance hx:  [ ]   Tobacco hx:  [ ]   Alcohol hx: [ ]   Home Opioid hx:  [ ] I-Stop Reference No:  Living Situation: [x ]Home  [ ]Long term care  [ ]Rehab [ ]Other    ADVANCE DIRECTIVES:    DNR  Yes    MOLST  [x ]  Living Will  [ ]   DECISION MAKER(s):  [ ] Health Care Proxy(s)  [x ] Surrogate(s)  [ ] Guardian           Name(s): Phone Number(s):  Gregorio Alexandre  [phone]  Wife Aliya  [phone]    BASELINE (I)ADL(s) (prior to admission):  [place]: [x ]Total  [ ] Moderate [ ]Dependent    Allergies    No Known Allergies    Intolerances    MEDICATIONS  (STANDING):  albuterol/ipratropium for Nebulization 3 milliLiter(s) Nebulizer every 6 hours  ascorbic acid 500 milliGRAM(s) Oral daily  BACItracin   Ointment 1 Application(s) Topical two times a day  cefepime   IVPB 2000 milliGRAM(s) IV Intermittent every 8 hours  chlorhexidine 0.12% Liquid 15 milliLiter(s) Oral Mucosa every 12 hours  chlorhexidine 4% Liquid 1 Application(s) Topical <User Schedule>  dextrose 5%. 1000 milliLiter(s) (50 mL/Hr) IV Continuous <Continuous>  dextrose 50% Injectable 12.5 Gram(s) IV Push once  dextrose 50% Injectable 25 Gram(s) IV Push once  diphtheria/tetanus/pertussis (acellular) Vaccine (ADAcel) 0.5 milliLiter(s) IntraMuscular once  DOPamine Infusion 2 MICROgram(s)/kG/Min (6.08 mL/Hr) IV Continuous <Continuous>  enoxaparin Injectable 40 milliGRAM(s) SubCutaneous <User Schedule>  fentaNYL   Infusion... 0.5 MICROgram(s)/kG/Hr (2.03 mL/Hr) IV Continuous <Continuous>  ferrous    sulfate 325 milliGRAM(s) Oral daily  influenza   Vaccine 0.5 milliLiter(s) IntraMuscular once  insulin lispro (ADMELOG) corrective regimen sliding scale.   SubCutaneous every 6 hours  insulin NPH human recombinant 16 Unit(s) SubCutaneous every 6 hours  multivitamin 1 Tablet(s) Oral daily  pantoprazole  Injectable 40 milliGRAM(s) IV Push daily  polyethylene glycol 3350 17 Gram(s) Oral two times a day  senna 2 Tablet(s) Oral at bedtime  sodium chloride 3%  Inhalation 3 milliLiter(s) Inhalation every 6 hours    MEDICATIONS  (PRN):  acetaminophen    Suspension .. 650 milliGRAM(s) Oral every 6 hours PRN Temp greater or equal to 38C (100.4F), Mild Pain (1 - 3)  glucagon  Injectable 1 milliGRAM(s) IntraMuscular once PRN Glucose LESS THAN 70 milligrams/deciliter  glucagon  Injectable 1 milliGRAM(s) IntraMuscular once PRN Glucose LESS THAN 70 milligrams/deciliter  ondansetron Injectable 4 milliGRAM(s) IV Push every 6 hours PRN Nausea and/or Vomiting  oxyCODONE    IR 5 milliGRAM(s) Oral every 4 hours PRN Moderate Pain (4 - 6)  oxyCODONE    IR 10 milliGRAM(s) Oral every 4 hours PRN Severe Pain (7 - 10)  sodium chloride 0.9% lock flush 10 milliLiter(s) IV Push every 1 hour PRN Pre/post blood products, medications, blood draw, and to maintain line patency    PRESENT SYMPTOMS: [ ]Unable to obtain due to poor mentation   Source if other than patient:  [ ]Family   [ ]Team     Pain: [ ]yes [x ]no  QOL impact -   Location -                    Aggravating factors -  Quality -  Radiation -  Timing-  Severity (0-10 scale):  Minimal acceptable level (0-10 scale):     CPOT:  0  https://www.sccm.org/getattachment/dur61l28-5j0d-5o8z-3i2o-4188n6004m4o/Critical-Care-Pain-Observation-Tool-(CPOT)      PAIN AD Score: 0    [email or web address]://[email or web address] (press ctrl +  left click to view)    Dyspnea:                           [ ]Mild [ ]Moderate [ ]Severe  Anxiety:                             [ ]Mild [ ]Moderate [ ]Severe  Fatigue:                             [ ]Mild [ ]Moderate [ ]Severe  Nausea:                             [ ]Mild [ ]Moderate [ ]Severe  Loss of appetite:              [ ]Mild [ ]Moderate [ ]Severe  Constipation:                    [ ]Mild [ ]Moderate [ ]Severe    Other Symptoms:  [ ]All other review of systems negative - Limited due to being intubated    Palliative Performance Status Version 2:   10      %    [email or web address]/[email or web address]_[email or web address]    PHYSICAL EXAM:  Vital Signs Last 24 Hrs  T(C): 37.2 (21 Oct 2020 11:00), Max: 37.3 (21 Oct 2020 07:00)  T(F): 99 (21 Oct 2020 11:00), Max: 99.1 (21 Oct 2020 07:00)  HR: 64 (21 Oct 2020 13:00) (48 - 73)  BP: --  BP(mean): --  RR: 21 (21 Oct 2020 13:00) (14 - 24)  SpO2: 100% (21 Oct 2020 13:00) (89% - 100%) I&O's Summary    20 Oct 2020 07:01  -  21 Oct 2020 07:00  --------------------------------------------------------  IN: 2989.5 mL / OUT: 1185 mL / NET: 1804.5 mL    21 Oct 2020 07:01  -  21 Oct 2020 13:38  --------------------------------------------------------  IN: 678.9 mL / OUT: 450 mL / NET: 228.9 mL      GENERAL:  [x ]Alert  [ ]Oriented x   [ ]Lethargic  [ ]Cachexia  [ ]Unarousable  [ ]Verbal  [ x]Non-Verbal  Behavioral:   [ ] Anxiety  [ ] Delirium [ ] Agitation [ ] Other  HEENT:  [ ]Normal   [ ]Dry mouth   [ x]ET Tube/Trach  [ ]Oral lesions  PULMONARY:   [x ]Clear [ ]Tachypnea  [ ]Audible excessive secretions    [ ]Rhonchi        [ ]Right [ ]Left [ ]Bilateral  [ ]Crackles        [ ]Right [ ]Left [ ]Bilateral  [ ]Wheezing     [ ]Right [ ]Left [ ]Bilateral  [ ]Diminished breath sounds [ ]right [ ]left [ ]bilateral  CARDIOVASCULAR:    [x ]Regular [ ]Irregular [ ]Tachy  [ ]Simon [ ]Murmur [ ]Other  GASTROINTESTINAL:  [x ]Soft  [ ]Distended   [x ]+BS  [ x]Non tender [ ]Tender  [ ]PEG [ ]OGT/ NGT  Last BM: 10/21  GENITOURINARY:  [ ]Normal [ ] Incontinent   [ ]Oliguria/Anuria   [x ]Cardenas   MUSCULOSKELETAL:   [ ]Normal   [ ]Weakness  [ x]Bed/Wheelchair bound [ ]Edema  NEUROLOGIC:   [ ]No focal deficits  [ ]Cognitive impairment  [ ]Dysphagia [ ]Dysarthria [ 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 [x ]Other - answers questions with yes or no questions,   SKIN: Abrasions on forehead and knee  [ ]Normal    [ ]Rash  [ ]Pressure ulcer(s)       Present on admission [ x]y [ ]n    CRITICAL CARE:  [ ] Shock Present  [ ]Septic [ ]Cardiogenic [x]Neurologic [ ]Hypovolemic  [ ]  Vasopressors [x ]  Inotropes   [x ]Respiratory failure present [ x]Mechanical ventilation [ ]Non-invasive ventilatory support [ ]High flow  Mode: CPAP with PS, FiO2: 50, PEEP: 5, PS: 10, MAP: 7  [x ]Acute  [ ]Chronic [x ]Hypoxic  [ x]Hypercarbic [ ]Other  [ ]Other organ failure     LABS:                        8.6    13.37 )-----------( 271      ( 20 Oct 2020 22:46 )             26.1   10-20    151<H>  |  116<H>  |  30<H>  ----------------------------<  134<H>  3.9   |  27  |  0.76    Ca    8.5      20 Oct 2020 22:46  Phos  4.7     10-20  Mg     2.4     10-20          RADIOLOGY & ADDITIONAL STUDIES:    < from: MR Cervical Spine No Cont (10.14.20 @ 20:25) >  Impression:    Cervical spine MRI: C3-4 central disc herniation results in compression of the spinal cord with associated spinal cord edema from C3 through C4. Extensive prevertebral soft tissue swelling at C1 through C5 and at C7-T3 with probable disruption of the anterior longitudinal ligament. Interspinous ligamentous injury at C2-C6.    Thoracic spine MRI: No evidence for spinal cord compression.    The results of this examination were discussed with Dr. Ackerman by Dr. Abdalla at 8:35 PM on 10/14/2020      SHERITA CHRISTIANSON M.D., ATTENDING RADIOLOGIST  This document has been electronically signed. Oct 14 2020  8:41PM    < end of copied text >  < from: Xray Chest 1 View- PORTABLE-Routine (Xray Chest 1 View- PORTABLE-Routine in AM.) (10.21.20 @ 04:44) >    Impression:    The heart is normal in size. Right pleural effusion. The left lung is clear. Endotracheal tube is in the level of the clavicle. NG tube is in the stomach however its tip is not seen on the current study. A central line is seen on the left and the tip is in the superior vena cava. No pneumothorax.    FUAD RAMOS M.D., ATTENDING RADIOLOGIST  This document has been electronically signed. Oct 21 2020  9:10AM    < end of copied text >      PROTEIN CALORIE MALNUTRITION PRESENT: [ ]mild [ ]moderate [ ]severe [ ]underweight [ ]morbid obesity  https://www.andeal.org/vault/2440/web/files/ONC/Table_Clinical%20Characteristics%20to%20Document%20Malnutrition-White%20JV%20et%20al%202012.pdf    Height (cm): 170.2 (10-16-20 @ 16:15)  Weight (kg): 81 (10-14-20 @ 20:25)  BMI (kg/m2): 28 (10-16-20 @ 16:15)    [x ]PPSV2 < or = to 30% [ ]significant weight loss  [ ]poor nutritional intake  [ ]anasarca     Albumin, Serum: 2.6 g/dL (10-19-20 @ 05:21)   [x ]Artificial Nutrition  - TPN    REFERRALS:   [ ]Chaplaincy  [ ]Hospice  [ ]Child Life  [ ]Social Work  [ ]Case management [ ]Holistic Therapy     Goals of Care Document: HPI:  · HPI Objective Statement: 66yo M pmhx DM BIBEMS s/p fall from ladder >10 ft, was laying on floor about 1 hr, unable to move upper or lower extremities. Awake, protecting airway, answers questions appropriately. Denies anticoagulant use.   (15 Oct 2020 05:14)    PERTINENT PM/SXH:   No pertinent past medical history      No significant past surgical history      FAMILY HISTORY:    ITEMS NOT CHECKED ARE NOT PRESENT    SOCIAL HISTORY:   Significant other/partner[x ]  Children[ x]  Confucianism/Spirituality:  Substance hx:  [ ]   Tobacco hx:  [ ]   Alcohol hx: [ ]   Home Opioid hx:  [ ] I-Stop Reference No:  Living Situation: [x ]Home  [ ]Long term care  [ ]Rehab [ ]Other    ADVANCE DIRECTIVES:    DNR  Yes    MOLST  [x ]  Living Will  [ ]   DECISION MAKER(s):  [ ] Health Care Proxy(s)  [x ] Surrogate(s)  [ ] Guardian           Name(s): Phone Number(s):  Gregorio Alexandre  [phone]  Wife Aliya  [phone]    BASELINE (I)ADL(s) (prior to admission):  [place]: [x ]Total  [ ] Moderate [ ]Dependent    Allergies    No Known Allergies    Intolerances    MEDICATIONS  (STANDING):  albuterol/ipratropium for Nebulization 3 milliLiter(s) Nebulizer every 6 hours  ascorbic acid 500 milliGRAM(s) Oral daily  BACItracin   Ointment 1 Application(s) Topical two times a day  cefepime   IVPB 2000 milliGRAM(s) IV Intermittent every 8 hours  chlorhexidine 0.12% Liquid 15 milliLiter(s) Oral Mucosa every 12 hours  chlorhexidine 4% Liquid 1 Application(s) Topical <User Schedule>  dextrose 5%. 1000 milliLiter(s) (50 mL/Hr) IV Continuous <Continuous>  dextrose 50% Injectable 12.5 Gram(s) IV Push once  dextrose 50% Injectable 25 Gram(s) IV Push once  diphtheria/tetanus/pertussis (acellular) Vaccine (ADAcel) 0.5 milliLiter(s) IntraMuscular once  DOPamine Infusion 2 MICROgram(s)/kG/Min (6.08 mL/Hr) IV Continuous <Continuous>  enoxaparin Injectable 40 milliGRAM(s) SubCutaneous <User Schedule>  fentaNYL   Infusion... 0.5 MICROgram(s)/kG/Hr (2.03 mL/Hr) IV Continuous <Continuous>  ferrous    sulfate 325 milliGRAM(s) Oral daily  influenza   Vaccine 0.5 milliLiter(s) IntraMuscular once  insulin lispro (ADMELOG) corrective regimen sliding scale.   SubCutaneous every 6 hours  insulin NPH human recombinant 16 Unit(s) SubCutaneous every 6 hours  multivitamin 1 Tablet(s) Oral daily  pantoprazole  Injectable 40 milliGRAM(s) IV Push daily  polyethylene glycol 3350 17 Gram(s) Oral two times a day  senna 2 Tablet(s) Oral at bedtime  sodium chloride 3%  Inhalation 3 milliLiter(s) Inhalation every 6 hours    MEDICATIONS  (PRN):  acetaminophen    Suspension .. 650 milliGRAM(s) Oral every 6 hours PRN Temp greater or equal to 38C (100.4F), Mild Pain (1 - 3)  glucagon  Injectable 1 milliGRAM(s) IntraMuscular once PRN Glucose LESS THAN 70 milligrams/deciliter  glucagon  Injectable 1 milliGRAM(s) IntraMuscular once PRN Glucose LESS THAN 70 milligrams/deciliter  ondansetron Injectable 4 milliGRAM(s) IV Push every 6 hours PRN Nausea and/or Vomiting  oxyCODONE    IR 5 milliGRAM(s) Oral every 4 hours PRN Moderate Pain (4 - 6)  oxyCODONE    IR 10 milliGRAM(s) Oral every 4 hours PRN Severe Pain (7 - 10)  sodium chloride 0.9% lock flush 10 milliLiter(s) IV Push every 1 hour PRN Pre/post blood products, medications, blood draw, and to maintain line patency    PRESENT SYMPTOMS: [ ]Unable to obtain due to poor mentation   Source if other than patient:  [ ]Family   [ ]Team     Pain: [ ]yes [x ]no  QOL impact -   Location -                    Aggravating factors -  Quality -  Radiation -  Timing-  Severity (0-10 scale):  Minimal acceptable level (0-10 scale):     CPOT:  0  https://www.sccm.org/getattachment/dqm91y08-2c5t-2l7x-1j0s-4187x8078x5t/Critical-Care-Pain-Observation-Tool-(CPOT)      PAIN AD Score: 0    [email or web address]/[email or web address] (press ctrl +  left click to view)    Dyspnea:                           [ ]Mild [ ]Moderate [ ]Severe  Anxiety:                             [ ]Mild [ ]Moderate [ ]Severe  Fatigue:                             [ ]Mild [ ]Moderate [ ]Severe  Nausea:                             [ ]Mild [ ]Moderate [ ]Severe  Loss of appetite:              [ ]Mild [ ]Moderate [ ]Severe  Constipation:                    [ ]Mild [ ]Moderate [ ]Severe    Other Symptoms:  [ ]All other review of systems negative - Limited due to being intubated    Palliative Performance Status Version 2:   10      %    [email or web address]/[email or web address]_performance_scale_[email or web address]    PHYSICAL EXAM:  Vital Signs Last 24 Hrs  T(C): 37.2 (21 Oct 2020 11:00), Max: 37.3 (21 Oct 2020 07:00)  T(F): 99 (21 Oct 2020 11:00), Max: 99.1 (21 Oct 2020 07:00)  HR: 64 (21 Oct 2020 13:00) (48 - 73)  BP: --  BP(mean): --  RR: 21 (21 Oct 2020 13:00) (14 - 24)  SpO2: 100% (21 Oct 2020 13:00) (89% - 100%) I&O's Summary    20 Oct 2020 07:01  -  21 Oct 2020 07:00  --------------------------------------------------------  IN: 2989.5 mL / OUT: 1185 mL / NET: 1804.5 mL    21 Oct 2020 07:01  -  21 Oct 2020 13:38  --------------------------------------------------------  IN: 678.9 mL / OUT: 450 mL / NET: 228.9 mL      GENERAL:  [x ]Alert  [ ]Oriented x   [ ]Lethargic  [ ]Cachexia  [ ]Unarousable  [ ]Verbal  [ x]Non-Verbal but able to communicate using yes/no  Behavioral:   [ ] Anxiety  [ ] Delirium [ ] Agitation [ ] Other  HEENT:  [ ]Normal   [ ]Dry mouth   [ x]ET Tube/Trach  [ ]Oral lesions  PULMONARY:   [x ]Clear [ ]Tachypnea  [ ]Audible excessive secretions    [ ]Rhonchi        [ ]Right [ ]Left [ ]Bilateral  [ ]Crackles        [ ]Right [ ]Left [ ]Bilateral  [ ]Wheezing     [ ]Right [ ]Left [ ]Bilateral  [ ]Diminished breath sounds [ ]right [ ]left [ ]bilateral  CARDIOVASCULAR:    [x ]Regular [ ]Irregular [ ]Tachy  [ ]Simon [ ]Murmur [ ]Other  GASTROINTESTINAL:  [x ]Soft  [ ]Distended   [x ]+BS  [ x]Non tender [ ]Tender  [ ]PEG [ ]OGT/ NGT  Last BM: 10/21  GENITOURINARY:  [ ]Normal [ ] Incontinent   [ ]Oliguria/Anuria   [x ]Cardenas   MUSCULOSKELETAL:   [ ]Normal   [ ]Weakness  [ x]Bed/Wheelchair bound [ ]Edema  NEUROLOGIC:   [ ]No focal deficits  [ ]Cognitive impairment  [ ]Dysphagia [ ]Dysarthria [ 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 [x ]Other - answers questions with yes or no questions,   SKIN: Abrasions on forehead and knee  [ ]Normal    [ ]Rash  [ ]Pressure ulcer(s)       Present on admission [ x]y [ ]n    CRITICAL CARE:  [ ] Shock Present  [ ]Septic [ ]Cardiogenic [x]Neurologic [ ]Hypovolemic  [ ]  Vasopressors [x ]  Inotropes   [x ]Respiratory failure present [ x]Mechanical ventilation [ ]Non-invasive ventilatory support [ ]High flow  Mode: CPAP with PS, FiO2: 50, PEEP: 5, PS: 10, MAP: 7  [x ]Acute  [ ]Chronic [x ]Hypoxic  [ x]Hypercarbic [ ]Other  [ ]Other organ failure     LABS:                        8.6    13.37 )-----------( 271      ( 20 Oct 2020 22:46 )             26.1   10-20    151<H>  |  116<H>  |  30<H>  ----------------------------<  134<H>  3.9   |  27  |  0.76    Ca    8.5      20 Oct 2020 22:46  Phos  4.7     10-20  Mg     2.4     10-20          RADIOLOGY & ADDITIONAL STUDIES:    < from: MR Cervical Spine No Cont (10.14.20 @ 20:25) >  Impression:    Cervical spine MRI: C3-4 central disc herniation results in compression of the spinal cord with associated spinal cord edema from C3 through C4. Extensive prevertebral soft tissue swelling at C1 through C5 and at C7-T3 with probable disruption of the anterior longitudinal ligament. Interspinous ligamentous injury at C2-C6.    Thoracic spine MRI: No evidence for spinal cord compression.    The results of this examination were discussed with Dr. Ackerman by Dr. Abdalla at 8:35 PM on 10/14/2020      SHERITA CHRISTIANSON M.D., ATTENDING RADIOLOGIST  This document has been electronically signed. Oct 14 2020  8:41PM    < end of copied text >  < from: Xray Chest 1 View- PORTABLE-Routine (Xray Chest 1 View- PORTABLE-Routine in AM.) (10.21.20 @ 04:44) >    Impression:    The heart is normal in size. Right pleural effusion. The left lung is clear. Endotracheal tube is in the level of the clavicle. NG tube is in the stomach however its tip is not seen on the current study. A central line is seen on the left and the tip is in the superior vena cava. No pneumothorax.    FUAD RAMOS M.D., ATTENDING RADIOLOGIST  This document has been electronically signed. Oct 21 2020  9:10AM    < end of copied text >      PROTEIN CALORIE MALNUTRITION PRESENT: [ ]mild [ ]moderate [ ]severe [ ]underweight [ ]morbid obesity  https://www.andeal.org/vault/2440/web/files/ONC/Table_Clinical%20Characteristics%20to%20Document%20Malnutrition-White%20JV%20et%[email or web address].pdf    Height (cm): 170.2 (10-16-20 @ 16:15)  Weight (kg): 81 (10-14-20 @ 20:25)  BMI (kg/m2): 28 (10-16-20 @ 16:15)    [x ]PPSV2 < or = to 30% [ ]significant weight loss  [ ]poor nutritional intake  [ ]anasarca     Albumin, Serum: 2.6 g/dL (10-19-20 @ 05:21)   [x ]Artificial Nutrition  - tube feeds    REFERRALS:   [ ]Chaplaincy  [ ]Hospice  [ ]Child Life  [ x ]Social Work  [ ]Case management [ ]Holistic Therapy     Goals of Care Document:

## 2020-10-22 NOTE — PROGRESS NOTE ADULT - PROBLEM SELECTOR PLAN 5
- BMP on 151 on 10/21.   - Continue with Free water flushes   - Labs discontinued at this time. However, per conversation with son Baldomero, wants to consider BMP if becomes encephalopathic to check for hypernatremia

## 2020-10-22 NOTE — PROGRESS NOTE ADULT - PROBLEM SELECTOR PLAN 2
-Noted to have fevers 10/17-10/18  -Sputum culture 10/18 +enterobacter, Hafnia alvei, staph aureus.   -BC NGTD  -C/w Cefepime  -Suction PRN.

## 2020-10-22 NOTE — PROGRESS NOTE ADULT - ASSESSMENT
65 year-old Cantonese-speaking man who fell >10 feet from ladder and was found unable to move arms or legs on 10/14/20. He was seen in the [facility] ED where he was a Level 1 Trauma. He was noted to have an PETR A cervical cord injury with MRI notable for C3-4 central disc herniation resulting in compression of the spinal cord associated with spinal cord edema from C3 through C4 as well as extensive prevertebral soft tissue swelling at C1 through C5 and C7-T3 with probable disruption of the anterior longitudinal ligament and interspinous ligamentous injury at C2-C6 for which he was taken for emergent C3-5 laminectomy with C2-6 fusion.  Patient was Extubated and reintubated due to hypoxia and hypercarbia on 10/18. Found to have Bradycardia with Significant sinus pause of 45s.  Palliative Care consulted for goals of care 65 year-old Cantonese-speaking man who fell >10 feet from ladder and was found unable to move arms or legs on 10/14/20. He was seen in the [facility] ED where he was a Level 1 Trauma. He was noted to have an PETR A cervical cord injury with MRI notable for C3-4 central disc herniation resulting in compression of the spinal cord associated with spinal cord edema from C3 through C4 as well as extensive prevertebral soft tissue swelling at C1 through C5 and C7-T3 with probable disruption of the anterior longitudinal ligament and interspinous ligamentous injury at C2-C6 for which he was taken for emergent C3-5 laminectomy with C2-6 fusion.  Patient was Extubated and reintubated due to hypoxia and hypercarbia on 10/18. Found to have Bradycardia with Significant sinus pause of 45s.  Palliative Care consulted for goals of care.

## 2020-10-22 NOTE — PROGRESS NOTE ADULT - THIS PATIENT HAS THE FOLLOWING CONDITION(S)/DIAGNOSES ON THIS ADMISSION:
None
spinal cord injury
Acute Respiratory Failure
Acute Respiratory Failure
cord injury
Acute Respiratory Failure/Functional Quadriplegia
Encephalopathy

## 2020-10-22 NOTE — PROGRESS NOTE ADULT - SUBJECTIVE AND OBJECTIVE BOX
Subjective: Patient seen and examined. No new events except as noted.     SUBJECTIVE/ROS:  on vent       MEDICATIONS:  MEDICATIONS  (STANDING):  albuterol/ipratropium for Nebulization 3 milliLiter(s) Nebulizer every 6 hours  ascorbic acid 500 milliGRAM(s) Oral daily  BACItracin   Ointment 1 Application(s) Topical two times a day  cefepime   IVPB 2000 milliGRAM(s) IV Intermittent every 8 hours  chlorhexidine 0.12% Liquid 15 milliLiter(s) Oral Mucosa every 12 hours  chlorhexidine 4% Liquid 1 Application(s) Topical <User Schedule>  dextrose 5%. 1000 milliLiter(s) (50 mL/Hr) IV Continuous <Continuous>  dextrose 50% Injectable 12.5 Gram(s) IV Push once  dextrose 50% Injectable 25 Gram(s) IV Push once  diphtheria/tetanus/pertussis (acellular) Vaccine (ADAcel) 0.5 milliLiter(s) IntraMuscular once  DOPamine Infusion 2 MICROgram(s)/kG/Min (6.08 mL/Hr) IV Continuous <Continuous>  enoxaparin Injectable 40 milliGRAM(s) SubCutaneous <User Schedule>  fentaNYL   Infusion... 0.5 MICROgram(s)/kG/Hr (2.03 mL/Hr) IV Continuous <Continuous>  ferrous    sulfate 325 milliGRAM(s) Oral daily  influenza   Vaccine 0.5 milliLiter(s) IntraMuscular once  insulin lispro (ADMELOG) corrective regimen sliding scale.   SubCutaneous every 6 hours  insulin NPH human recombinant 16 Unit(s) SubCutaneous every 6 hours  multivitamin 1 Tablet(s) Oral daily  pantoprazole  Injectable 40 milliGRAM(s) IV Push daily  polyethylene glycol 3350 17 Gram(s) Oral two times a day  senna 2 Tablet(s) Oral at bedtime  sodium chloride 3%  Inhalation 3 milliLiter(s) Inhalation every 6 hours      PHYSICAL EXAM:  T(C): 36.9 (10-21-20 @ 23:00), Max: 37.2 (10-21-20 @ 11:00)  HR: 65 (10-22-20 @ 06:45) (52 - 87)  BP: 98/85 (10-22-20 @ 06:15) (85/60 - 169/88)  RR: 18 (10-22-20 @ 06:45) (14 - 23)  SpO2: 100% (10-22-20 @ 06:45) (90% - 100%)  Wt(kg): --  I&O's Summary    21 Oct 2020 07:01  -  22 Oct 2020 07:00  --------------------------------------------------------  IN: 2782.2 mL / OUT: 1975 mL / NET: 807.2 mL      Appearance: on vent 	  Cardiovascular: Normal S1 S2,    Murmur:   Neck: JVP limited to be evaluated   Respiratory: Lungs few rhonchi   Gastrointestinal:  Soft  Skin: normal   Neuro: limited as pt on vent      LABS/DATA:    CARDIAC MARKERS:                                8.6    13.37 )-----------( 271      ( 20 Oct 2020 22:46 )             26.1     10-21    150<H>  |  114<H>  |  29<H>  ----------------------------<  149<H>  3.7   |  28  |  0.81    Ca    8.4      21 Oct 2020 20:53  Phos  4.3     10-21  Mg     2.5     10-21      proBNP:   Lipid Profile:   HgA1c:   TSH:     TELE:  EKG:

## 2020-10-22 NOTE — PROGRESS NOTE ADULT - PROBLEM SELECTOR PLAN 8
- Family reports patient was living at home with them in [place]. 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 Wife tearful during encounter as his falling off a ladder was an accident. Wife says family from [facility] have been able to see patient via IPAD. Wife asking about what to expect after extubation, including if he would be able to speak with them afterwards; educated wife on what possibly can be expected given that he failed extubation trial a few days ago. Gregorio Alexandre's medical questions about the extubation process were answered.   - Emotional support provided to wife Aliya and sons Baldomero and James at bedside.  - Chaplaincy referral offered, which wife is receptive to.   - Continue with management in PCU

## 2020-10-22 NOTE — PROGRESS NOTE ADULT - SUBJECTIVE AND OBJECTIVE BOX
Follow-up Pulm Progress Note    Tx to PCU this AM  Sats 100% 40% FiO2   Alert    Medications:  MEDICATIONS  (STANDING):  albuterol/ipratropium for Nebulization 3 milliLiter(s) Nebulizer every 6 hours  ascorbic acid 500 milliGRAM(s) Oral daily  BACItracin   Ointment 1 Application(s) Topical two times a day  cefepime   IVPB 2000 milliGRAM(s) IV Intermittent every 8 hours  chlorhexidine 0.12% Liquid 15 milliLiter(s) Oral Mucosa every 12 hours  chlorhexidine 4% Liquid 1 Application(s) Topical <User Schedule>  dextrose 5%. 1000 milliLiter(s) (50 mL/Hr) IV Continuous <Continuous>  dextrose 50% Injectable 12.5 Gram(s) IV Push once  dextrose 50% Injectable 25 Gram(s) IV Push once  diphtheria/tetanus/pertussis (acellular) Vaccine (ADAcel) 0.5 milliLiter(s) IntraMuscular once  DOPamine Infusion 2 MICROgram(s)/kG/Min (6.08 mL/Hr) IV Continuous <Continuous>  enoxaparin Injectable 40 milliGRAM(s) SubCutaneous <User Schedule>  fentaNYL   Infusion... 0.5 MICROgram(s)/kG/Hr (2.03 mL/Hr) IV Continuous <Continuous>  ferrous    sulfate 325 milliGRAM(s) Oral daily  influenza   Vaccine 0.5 milliLiter(s) IntraMuscular once  insulin lispro (ADMELOG) corrective regimen sliding scale.   SubCutaneous every 6 hours  insulin NPH human recombinant 16 Unit(s) SubCutaneous every 6 hours  multivitamin 1 Tablet(s) Oral daily  pantoprazole  Injectable 40 milliGRAM(s) IV Push daily  polyethylene glycol 3350 17 Gram(s) Oral two times a day  senna 2 Tablet(s) Oral at bedtime  sodium chloride 3%  Inhalation 3 milliLiter(s) Inhalation every 6 hours    MEDICATIONS  (PRN):  acetaminophen    Suspension .. 650 milliGRAM(s) Oral every 6 hours PRN Temp greater or equal to 38C (100.4F), Mild Pain (1 - 3)  glucagon  Injectable 1 milliGRAM(s) IntraMuscular once PRN Glucose LESS THAN 70 milligrams/deciliter  glucagon  Injectable 1 milliGRAM(s) IntraMuscular once PRN Glucose LESS THAN 70 milligrams/deciliter  ondansetron Injectable 4 milliGRAM(s) IV Push every 6 hours PRN Nausea and/or Vomiting  oxyCODONE    IR 5 milliGRAM(s) Oral every 4 hours PRN Moderate Pain (4 - 6)  oxyCODONE    IR 10 milliGRAM(s) Oral every 4 hours PRN Severe Pain (7 - 10)  sodium chloride 0.9% lock flush 10 milliLiter(s) IV Push every 1 hour PRN Pre/post blood products, medications, blood draw, and to maintain line patency      Mode: AC/ CMV (Assist Control/ Continuous Mandatory Ventilation)  RR (machine): 16  TV (machine): 500  FiO2: 40  PEEP: 5  ITime: 0.95  MAP: 9  PIP: 19  MV: 7      Vital Signs Last 24 Hrs  T(C): 37.4 (22 Oct 2020 07:00), Max: 37.4 (22 Oct 2020 07:00)  T(F): 99.4 (22 Oct 2020 07:00), Max: 99.4 (22 Oct 2020 07:00)  HR: 57 (22 Oct 2020 10:15) (55 - 87)  BP: 98/85 (22 Oct 2020 06:15) (85/60 - 169/88)  BP(mean): 92 (22 Oct 2020 06:15) (68 - 111)  RR: 16 (22 Oct 2020 10:15) (14 - 23)  SpO2: 98% (22 Oct 2020 10:15) (90% - 100%)    ABG - ( 20 Oct 2020 17:57 )  pH, Arterial: 7.48  pH, Blood: x     /  pCO2: 41    /  pO2: 77    / HCO3: 30    / Base Excess: 6.1   /  SaO2: 96                    10-21 @ 07:01  -  10-22 @ 07:00  --------------------------------------------------------  IN: 2782.2 mL / OUT: 1975 mL / NET: 807.2 mL          LABS:                        8.6    13.37 )-----------( 271      ( 20 Oct 2020 22:46 )             26.1     10-21    150<H>  |  114<H>  |  29<H>  ----------------------------<  149<H>  3.7   |  28  |  0.81    Ca    8.4      21 Oct 2020 20:53  Phos  4.3     10-21  Mg     2.5     10-21            CAPILLARY BLOOD GLUCOSE      POCT Blood Glucose.: 137 mg/dL (22 Oct 2020 05:12)          CULTURES:     Culture - Blood (collected 10-17-20 @ 23:10)  Source: .Blood Blood-Arterial  Preliminary Report (10-19-20 @ 01:01):    No growth to date.    Culture - Blood (collected 10-17-20 @ 23:10)  Source: .Blood Blood-Arterial  Preliminary Report (10-19-20 @ 01:01):    No growth to date.        Physical Examination:  PULM: Clear to auscultation anteriorly   CVS: RRR    RADIOLOGY REVIEWED  CXR: R pl effusion

## 2020-10-22 NOTE — PROGRESS NOTE ADULT - PROBLEM SELECTOR PLAN 7
- DNR. Plans for Compassionate Extubation.   - Son wants to continue tube feeds for the interim and to complete antibiotic course for pneumonia. Son agreeable to discontinuing labs, but wants to consider BMP if becomes encephalopathic to check for hypernatremia.

## 2020-10-22 NOTE — PROGRESS NOTE ADULT - PROBLEM SELECTOR PLAN 2
- likely due to sympathetic dysfunction with unopposed high vagal tone   - cardiology recommendations appreciated  - continue with dopamine infusion.

## 2020-10-22 NOTE — PROGRESS NOTE ADULT - SUBJECTIVE AND OBJECTIVE BOX
SUMMARY:  54 year-old Cantonese-speaking man who fell >10 feet from ladder and was found unable to move arms or legs on 10/14/20. He was seen in the [facility] ED where he was a Level 1 Trauma. He was noted to have an PETR A cervical cord injury with MRI notable for C3-4 central disc herniation resulting in compression of the spinal cord associated with spinal cord edema from C3 through C4 as well as extensive prevertebral soft tissue swelling at C1 through C5 and C7-T3 with probable disruption of the anterior longitudinal ligament and interspinous ligamentous injury at C2-C6 for which he was taken for emergent C3-5 laminectomy with C2-6 fusion.    10/18: Extubated and reintubated due to hypoxia and hypercarbia  10/19: Bradycardic. Significant sinus pause of 45s which resolved prior to atropine (none given)    Overnight events:         ICU Vital Signs Last 24 Hrs  T(C): 36.9 (21 Oct 2020 23:00), Max: 37.3 (21 Oct 2020 07:00)  T(F): 98.4 (21 Oct 2020 23:00), Max: 99.1 (21 Oct 2020 07:00)  HR: 64 (22 Oct 2020 05:51) (52 - 87)  BP: 102/69 (22 Oct 2020 05:00) (85/60 - 169/88)  BP(mean): 79 (22 Oct 2020 05:00) (68 - 111)  ABP: 93/48 (22 Oct 2020 05:00) (80/42 - 197/78)  ABP(mean): 65 (22 Oct 2020 05:00) (57 - 119)  RR: 16 (22 Oct 2020 05:00) (14 - 23)  SpO2: 97% (22 Oct 2020 05:51) (90% - 100%)      10-20-20 @ 07:01  -  10-21-20 @ 07:00  --------------------------------------------------------  IN: 2989.5 mL / OUT: 1185 mL / NET: 1804.5 mL    10-21-20 @ 07:01  -  10-22-20 @ 06:36  --------------------------------------------------------  IN: 1351.5 mL / OUT: 1875 mL / NET: -523.5 mL      Mode: AC/ CMV (Assist Control/ Continuous Mandatory Ventilation), RR (machine): 16, TV (machine): 500, FiO2: 50, PEEP: 5, ITime: 1, MAP: 11, PIP: 22    acetaminophen    Suspension .. 650 milliGRAM(s) Oral every 6 hours PRN  albuterol/ipratropium for Nebulization 3 milliLiter(s) Nebulizer every 6 hours  ascorbic acid 500 milliGRAM(s) Oral daily  BACItracin   Ointment 1 Application(s) Topical two times a day  cefepime   IVPB 2000 milliGRAM(s) IV Intermittent every 8 hours  chlorhexidine 0.12% Liquid 15 milliLiter(s) Oral Mucosa every 12 hours  chlorhexidine 4% Liquid 1 Application(s) Topical <User Schedule>  dextrose 5%. 1000 milliLiter(s) (50 mL/Hr) IV Continuous <Continuous>  dextrose 50% Injectable 12.5 Gram(s) IV Push once  dextrose 50% Injectable 25 Gram(s) IV Push once  diphtheria/tetanus/pertussis (acellular) Vaccine (ADAcel) 0.5 milliLiter(s) IntraMuscular once  DOPamine Infusion 2 MICROgram(s)/kG/Min (6.08 mL/Hr) IV Continuous <Continuous>  enoxaparin Injectable 40 milliGRAM(s) SubCutaneous <User Schedule>  fentaNYL   Infusion... 0.5 MICROgram(s)/kG/Hr (2.03 mL/Hr) IV Continuous <Continuous>  ferrous    sulfate 325 milliGRAM(s) Oral daily  glucagon  Injectable 1 milliGRAM(s) IntraMuscular once PRN  glucagon  Injectable 1 milliGRAM(s) IntraMuscular once PRN  influenza   Vaccine 0.5 milliLiter(s) IntraMuscular once  insulin lispro (ADMELOG) corrective regimen sliding scale.   SubCutaneous every 6 hours  insulin NPH human recombinant 16 Unit(s) SubCutaneous every 6 hours  multivitamin 1 Tablet(s) Oral daily  ondansetron Injectable 4 milliGRAM(s) IV Push every 6 hours PRN  oxyCODONE    IR 5 milliGRAM(s) Oral every 4 hours PRN  oxyCODONE    IR 10 milliGRAM(s) Oral every 4 hours PRN  pantoprazole  Injectable 40 milliGRAM(s) IV Push daily  polyethylene glycol 3350 17 Gram(s) Oral two times a day  senna 2 Tablet(s) Oral at bedtime  sodium chloride 0.9% lock flush 10 milliLiter(s) IV Push every 1 hour PRN  sodium chloride 3%  Inhalation 3 milliLiter(s) Inhalation every 6 hours                            8.6    13.37 )-----------( 271      ( 20 Oct 2020 22:46 )             26.1     10-21    150<H>  |  114<H>  |  29<H>  ----------------------------<  149<H>  3.7   |  28  |  0.81    Ca    8.4      21 Oct 2020 20:53  Phos  4.3     10-21  Mg     2.5     10-21        ABG - ( 20 Oct 2020 17:57 )  pH, Arterial: 7.48  pH, Blood: x     /  pCO2: 41    /  pO2: 77    / HCO3: 30    / Base Excess: 6.1   /  SaO2: 96              EXAMINATION:  Gen: Intubated  HEENT: Collar, perrla  CV: s1, s2   Resp: CTAB  Abd: Soft nt/nd  Extrem: Pulses +2 throughout  Neuro: EO spontaneously, follows commands with eyes/nods head x3 with choices, L deltoid 4/5, bicep 4-, tricep 1/5, HG 1/5  R deltoid 2/5, bicep 4-, tricep 1, HG 1/5.              SUMMARY:  54 year-old Cantonese-speaking man who fell >10 feet from ladder and was found unable to move arms or legs on 10/14/20. He was seen in the [facility] ED where he was a Level 1 Trauma. He was noted to have an PETR A cervical cord injury with MRI notable for C3-4 central disc herniation resulting in compression of the spinal cord associated with spinal cord edema from C3 through C4 as well as extensive prevertebral soft tissue swelling at C1 through C5 and C7-T3 with probable disruption of the anterior longitudinal ligament and interspinous ligamentous injury at C2-C6 for which he was taken for emergent C3-5 laminectomy with C2-6 fusion.    10/18: Extubated and reintubated due to hypoxia and hypercarbia  10/19: Bradycardic. Significant sinus pause of 45s which resolved prior to atropine (none given)    Overnight events: Bradycardic      ICU Vital Signs Last 24 Hrs  T(C): 36.9 (21 Oct 2020 23:00), Max: 37.3 (21 Oct 2020 07:00)  T(F): 98.4 (21 Oct 2020 23:00), Max: 99.1 (21 Oct 2020 07:00)  HR: 64 (22 Oct 2020 05:51) (52 - 87)  BP: 102/69 (22 Oct 2020 05:00) (85/60 - 169/88)  BP(mean): 79 (22 Oct 2020 05:00) (68 - 111)  ABP: 93/48 (22 Oct 2020 05:00) (80/42 - 197/78)  ABP(mean): 65 (22 Oct 2020 05:00) (57 - 119)  RR: 16 (22 Oct 2020 05:00) (14 - 23)  SpO2: 97% (22 Oct 2020 05:51) (90% - 100%)      10-20-20 @ 07:01  -  10-21-20 @ 07:00  --------------------------------------------------------  IN: 2989.5 mL / OUT: 1185 mL / NET: 1804.5 mL    10-21-20 @ 07:01  -  10-22-20 @ 06:36  --------------------------------------------------------  IN: 1351.5 mL / OUT: 1875 mL / NET: -523.5 mL      Mode: AC/ CMV (Assist Control/ Continuous Mandatory Ventilation), RR (machine): 16, TV (machine): 500, FiO2: 50, PEEP: 5, ITime: 1, MAP: 11, PIP: 22    acetaminophen    Suspension .. 650 milliGRAM(s) Oral every 6 hours PRN  albuterol/ipratropium for Nebulization 3 milliLiter(s) Nebulizer every 6 hours  ascorbic acid 500 milliGRAM(s) Oral daily  BACItracin   Ointment 1 Application(s) Topical two times a day  cefepime   IVPB 2000 milliGRAM(s) IV Intermittent every 8 hours  chlorhexidine 0.12% Liquid 15 milliLiter(s) Oral Mucosa every 12 hours  chlorhexidine 4% Liquid 1 Application(s) Topical <User Schedule>  dextrose 5%. 1000 milliLiter(s) (50 mL/Hr) IV Continuous <Continuous>  dextrose 50% Injectable 12.5 Gram(s) IV Push once  dextrose 50% Injectable 25 Gram(s) IV Push once  diphtheria/tetanus/pertussis (acellular) Vaccine (ADAcel) 0.5 milliLiter(s) IntraMuscular once  DOPamine Infusion 2 MICROgram(s)/kG/Min (6.08 mL/Hr) IV Continuous <Continuous>  enoxaparin Injectable 40 milliGRAM(s) SubCutaneous <User Schedule>  fentaNYL   Infusion... 0.5 MICROgram(s)/kG/Hr (2.03 mL/Hr) IV Continuous <Continuous>  ferrous    sulfate 325 milliGRAM(s) Oral daily  glucagon  Injectable 1 milliGRAM(s) IntraMuscular once PRN  glucagon  Injectable 1 milliGRAM(s) IntraMuscular once PRN  influenza   Vaccine 0.5 milliLiter(s) IntraMuscular once  insulin lispro (ADMELOG) corrective regimen sliding scale.   SubCutaneous every 6 hours  insulin NPH human recombinant 16 Unit(s) SubCutaneous every 6 hours  multivitamin 1 Tablet(s) Oral daily  ondansetron Injectable 4 milliGRAM(s) IV Push every 6 hours PRN  oxyCODONE    IR 5 milliGRAM(s) Oral every 4 hours PRN  oxyCODONE    IR 10 milliGRAM(s) Oral every 4 hours PRN  pantoprazole  Injectable 40 milliGRAM(s) IV Push daily  polyethylene glycol 3350 17 Gram(s) Oral two times a day  senna 2 Tablet(s) Oral at bedtime  sodium chloride 0.9% lock flush 10 milliLiter(s) IV Push every 1 hour PRN  sodium chloride 3%  Inhalation 3 milliLiter(s) Inhalation every 6 hours                            8.6    13.37 )-----------( 271      ( 20 Oct 2020 22:46 )             26.1     10-21    150<H>  |  114<H>  |  29<H>  ----------------------------<  149<H>  3.7   |  28  |  0.81    Ca    8.4      21 Oct 2020 20:53  Phos  4.3     10-21  Mg     2.5     10-21        ABG - ( 20 Oct 2020 17:57 )  pH, Arterial: 7.48  pH, Blood: x     /  pCO2: 41    /  pO2: 77    / HCO3: 30    / Base Excess: 6.1   /  SaO2: 96          EXAMINATION:  Gen: Intubated  HEENT: Collar, perrla  CV: S1, s2   Resp: CTAB  Abd: Soft nt/nd  Extrem: Pulses +2 throughout  Neuro: EO spontaneously, follows commands with eyes/nods head x3 with choices, L deltoid 4/5, bicep 4-, tricep 1/5, HG 1/5  R deltoid 2/5, bicep 4-, tricep 1, HG 1/5.

## 2020-10-22 NOTE — PROGRESS NOTE ADULT - PROBLEM SELECTOR PLAN 4
-Sputum culture 10/18 +enterobacter, Hafnia alvei, staph aureus.   -BC NGTD  -Continue with Cefepime

## 2020-10-22 NOTE — PROGRESS NOTE ADULT - ASSESSMENT
64 y/o M with no significant PMH. Presents to ED s/p fall from ladder - was laying on floor for about 1 hour unable to move upper or lower extremities. S/p OR 10/15 C3-C5 laminectomy, C2-C6 fusion. Extubated 5/18 and was re-intubated about 2 hrs later for hypercarbic respiratory failure. Course further c/b PNA - sputum culture 5/18 with enterobacter, Hafnia alvei, and staph aureus - now on abx. Currently on PS trials 5/10, sats 100% 40% FiO2.

## 2020-10-22 NOTE — PROGRESS NOTE ADULT - SUBJECTIVE AND OBJECTIVE BOX
Patient seen and examined at bedside.    --Anticoagulation--  enoxaparin Injectable 40 milliGRAM(s) SubCutaneous <User Schedule>    Vital Signs Last 24 Hrs  T(C): 36.9 (21 Oct 2020 23:00), Max: 37.3 (21 Oct 2020 07:00)  T(F): 98.4 (21 Oct 2020 23:00), Max: 99.1 (21 Oct 2020 07:00)  HR: 67 (22 Oct 2020 02:15) (48 - 87)  BP: --  BP(mean): --  RR: 18 (22 Oct 2020 02:15) (14 - 23)  SpO2: 93% (22 Oct 2020 02:15) (90% - 100%)    Exam: Intubated, EO to voice, EOMI, Ox3 nods appropriately, LUE delts 3/5, LUE  triceps 1-2/5, LUE biceps 4-/5, HG 0/5, RUE delts 2-3/5,  RUE biceps 3/5, RUE tri 1-2/5, HG 1/5, b/l  LE flaccid

## 2020-10-22 NOTE — PROGRESS NOTE ADULT - PROBLEM SELECTOR PLAN 1
- Extubated on 10/18 and reintubated within few hours due to hypoxia and hypercarbia   - On mechanical ventilation   - Plan for compassionate extubation

## 2020-10-22 NOTE — PROGRESS NOTE ADULT - SUBJECTIVE AND OBJECTIVE BOX
GAP TEAM PALLIATIVE CARE UNIT PROGRESS NOTE:      [  ] Patient on hospice program.    INDICATION FOR PALLIATIVE CARE UNIT SERVICES: symptom management in setting of cervical cord compression s/p C3-C5 laminectomy, C2-C6 fusion and in anticipation of compassionate extubation    INTERVAL HPI/OVERNIGHT EVENTS:  Patient seen and examined at bedside. Patient shakes his head no in response to questions, including to pain which he denies    DNR on chart: Yes      Allergies    No Known Allergies    Intolerances    MEDICATIONS  (STANDING):  albuterol/ipratropium for Nebulization 3 milliLiter(s) Nebulizer every 6 hours  ascorbic acid 500 milliGRAM(s) Oral daily  BACItracin   Ointment 1 Application(s) Topical two times a day  cefepime   IVPB 2000 milliGRAM(s) IV Intermittent every 8 hours  chlorhexidine 0.12% Liquid 15 milliLiter(s) Oral Mucosa every 12 hours  dextrose 5%. 1000 milliLiter(s) (50 mL/Hr) IV Continuous <Continuous>  dextrose 50% Injectable 12.5 Gram(s) IV Push once  dextrose 50% Injectable 25 Gram(s) IV Push once  diphtheria/tetanus/pertussis (acellular) Vaccine (ADAcel) 0.5 milliLiter(s) IntraMuscular once  DOPamine Infusion 4.51 MICROgram(s)/kG/Min (13.7 mL/Hr) IV Continuous <Continuous>  enoxaparin Injectable 40 milliGRAM(s) SubCutaneous <User Schedule>  fentaNYL   Infusion... 1.506 MICROgram(s)/kG/Hr (6.1 mL/Hr) IV Continuous <Continuous>  ferrous    sulfate 325 milliGRAM(s) Oral daily  influenza   Vaccine 0.5 milliLiter(s) IntraMuscular once  insulin lispro (ADMELOG) corrective regimen sliding scale.   SubCutaneous every 6 hours  insulin NPH human recombinant 16 Unit(s) SubCutaneous every 6 hours  multivitamin 1 Tablet(s) Oral daily  pantoprazole  Injectable 40 milliGRAM(s) IV Push daily  polyethylene glycol 3350 17 Gram(s) Oral two times a day  senna 2 Tablet(s) Oral at bedtime  sodium chloride 3%  Inhalation 3 milliLiter(s) Inhalation every 6 hours    MEDICATIONS  (PRN):  acetaminophen    Suspension .. 650 milliGRAM(s) Oral every 6 hours PRN Temp greater or equal to 38C (100.4F), Mild Pain (1 - 3)  glucagon  Injectable 1 milliGRAM(s) IntraMuscular once PRN Glucose LESS THAN 70 milligrams/deciliter  LORazepam   Injectable 0.5 milliGRAM(s) IV Push every 1 hour PRN Agitation  morphine  - Injectable 0.2 milliGRAM(s) IV Push every 1 hour PRN Pain  morphine  - Injectable 0.2 milliGRAM(s) IV Push every 1 hour PRN Dyspnea  ondansetron Injectable 4 milliGRAM(s) IV Push every 6 hours PRN Nausea and/or Vomiting  sodium chloride 0.9% lock flush 10 milliLiter(s) IV Push every 1 hour PRN Pre/post blood products, medications, blood draw, and to maintain line patency    ITEMS UNCHECKED ARE NOT PRESENT    PRESENT SYMPTOMS: [ ]Unable to obtain due to poor mentation   Source if other than patient:  [ ]Family   [ ]Team     Pain: [ ] yes [x ] no .   QOL impact -   Location -                    Aggravating factors -  Quality -  Radiation -  Timing-  Severity (0-10 scale):  Minimal acceptable level (0-10 scale):     Dyspnea:                           [ ]Mild [ ]Moderate [ ]Severe  Anxiety:                             [ ]Mild [ ]Moderate [ ]Severe  Fatigue:                             [ ]Mild [ ]Moderate [ ]Severe  Nausea:                             [ ]Mild [ ]Moderate [ ]Severe  Loss of appetite:              [ ]Mild [ ]Moderate [ ]Severe  Constipation:                    [ ]Mild [ ]Moderate [ ]Severe    PAINAD Score:     [email or web address] (Ctrl +  left click to view)  		  Other Symptoms:  [ ]All other review of systems negative - Limited due to patient being intubated    Palliative Performance Status Version 2:    10     %         [email or web address]    PHYSICAL EXAM:  Vital Signs Last 24 Hrs  T(C): 36.4 (22 Oct 2020 12:22), Max: 37.4 (22 Oct 2020 07:00)  T(F): 97.5 (22 Oct 2020 12:22), Max: 99.4 (22 Oct 2020 07:00)  HR: 60 (22 Oct 2020 18:11) (48 - 87)  BP: 120/48 (22 Oct 2020 12:22) (85/60 - 169/88)  BP(mean): 92 (22 Oct 2020 06:15) (68 - 111)  RR: 16 (22 Oct 2020 12:22) (15 - 23)  SpO2: 95% (22 Oct 2020 18:11) (90% - 100%) I&O's Summary    21 Oct 2020 07:01  -  22 Oct 2020 07:00  --------------------------------------------------------  IN: 2782.2 mL / OUT: 1975 mL / NET: 807.2 mL    22 Oct 2020 07:01  -  22 Oct 2020 19:01  --------------------------------------------------------  IN: 1279.4 mL / OUT: 205 mL / NET: 1074.4 mL    GENERAL:  [x ]Alert  [ ]Oriented x   [ ]Lethargic  [ ]Cachexia  [ ]Unarousable  [ ]Verbal  [ x]Non-Verbal but able to communicate using yes/no  Behavioral:   [ ] Anxiety  [ ] Delirium [ ] Agitation [ ] Other  HEENT:  [ ]Normal   [ ]Dry mouth   [ x]ET Tube/Trach  [ ]Oral lesions  PULMONARY:   [x ]Clear [ ]Tachypnea  [ ]Audible excessive secretions  [ x] mechanical breath sounds  [ ]Rhonchi        [ ]Right [ ]Left [ ]Bilateral  [ ]Crackles        [ ]Right [ ]Left [ ]Bilateral  [ ]Wheezing     [ ]Right [ ]Left [ ]Bilateral  [ ]Diminished breath sounds [ ]right [ ]left [ ]bilateral  CARDIOVASCULAR:    [x ]Regular [ ]Irregular [ ]Tachy  [ ]Simon [ ]Murmur [ ]Other  GASTROINTESTINAL:  [x ]Soft  [ ]Distended   [x ]+BS  [ x]Non tender [ ]Tender  [ ]PEG [x ]OGT/ NGT  Last BM: 10/21  GENITOURINARY:  [ ]Normal [ ] Incontinent   [ ]Oliguria/Anuria   [x ]Cardenas   MUSCULOSKELETAL:   [ ]Normal   [ ]Weakness  [ x]Bed/Wheelchair bound [ ]Edema  NEUROLOGIC:   [ ]No focal deficits  [ ]Cognitive impairment  [ ]Dysphagia [ ]Dysarthria [ 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 [x ]Other - able to answers yes or no questions with nodding of head  SKIN: Abrasions on forehead and knee  [ ]Normal    [ ]Rash  [ ]Pressure ulcer(s)       Present on admission [ x]y [ ]n    CRITICAL CARE:  [ ] Shock Present  [ ]Septic [ ]Cardiogenic [x]Neurologic [ ]Hypovolemic  [ ]  Vasopressors [x ]  Inotropes   [x ]Respiratory failure present [ x]Mechanical ventilation [ ]Non-invasive ventilatory support [ ]High flow    [x ]Acute  [ ]Chronic [x ]Hypoxic  [ x]Hypercarbic [ ]Other  [ ]Other organ failure     LABS:                        8.6    13.37 )-----------( 271      ( 20 Oct 2020 22:46 )             26.1   10-21    150<H>  |  114<H>  |  29<H>  ----------------------------<  149<H>  3.7   |  28  |  0.81    Ca    8.4      21 Oct 2020 20:53  Phos  4.3     10-21  Mg     2.5     10-21    RADIOLOGY & ADDITIONAL STUDIES:  CXRAY 10/22/2020  Frontal expiratory view of the chest shows the heart to be similar in size. Endotracheal tube reaches the upper thoracic trachea, approximately 2 cm above the medial clavicles. Nasogastric tube and left subclavian line remain present.  The lungs show similar small right effusion. The left lung is clear and there is no evidence of pneumothorax nor left pleural effusion.  IMPRESSION:  ET tube as noted.    PROTEIN CALORIE MALNUTRITION: [ ] mild [ ] moderate [ ] severe  [ ] underweight [ ] morbid obesity    https://www.andeal.org/vault/2440/web/files/ONC/Table_Clinical%20Characteristics%20to%20Document%20Malnutrition-White%20JV%20et%20al%792883.pdf    Height (cm): 170.2 (10-16-20 @ 16:15)  Weight (kg): 81 (10-14-20 @ 20:25)  BMI (kg/m2): 28 (10-16-20 @ 16:15)    [ x] PPSV2 < or = 30% [ ] significant weight loss [ ] poor nutritional intake [ ] anasarca Albumin, Serum: 2.6 g/dL (10-19-20 @ 05:21)    Artificial Nutrition [x ]     REFERRALS:   [x ]Chaplaincy  [ ] Hospice  [ ]Child Life  [ x]Social Work  [ ]Case management [ ]Holistic Therapy [ ] Physical Therapy [ ] Dietary   Goals of Care Document:    GAP TEAM PALLIATIVE CARE UNIT PROGRESS NOTE:      [  ] Patient on hospice program.    INDICATION FOR PALLIATIVE CARE UNIT SERVICES: symptom management in setting of cervical cord compression s/p C3-C5 laminectomy, C2-C6 fusion and in anticipation of compassionate extubation    INTERVAL HPI/OVERNIGHT EVENTS:  Patient seen and examined at bedside. Patient shakes his head no in response to questions, including to pain which he denies    DNR on chart: Yes      Allergies    No Known Allergies    Intolerances    MEDICATIONS  (STANDING):  albuterol/ipratropium for Nebulization 3 milliLiter(s) Nebulizer every 6 hours  ascorbic acid 500 milliGRAM(s) Oral daily  BACItracin   Ointment 1 Application(s) Topical two times a day  cefepime   IVPB 2000 milliGRAM(s) IV Intermittent every 8 hours  chlorhexidine 0.12% Liquid 15 milliLiter(s) Oral Mucosa every 12 hours  dextrose 5%. 1000 milliLiter(s) (50 mL/Hr) IV Continuous <Continuous>  dextrose 50% Injectable 12.5 Gram(s) IV Push once  dextrose 50% Injectable 25 Gram(s) IV Push once  diphtheria/tetanus/pertussis (acellular) Vaccine (ADAcel) 0.5 milliLiter(s) IntraMuscular once  DOPamine Infusion 4.51 MICROgram(s)/kG/Min (13.7 mL/Hr) IV Continuous <Continuous>  enoxaparin Injectable 40 milliGRAM(s) SubCutaneous <User Schedule>  fentaNYL   Infusion... 1.506 MICROgram(s)/kG/Hr (6.1 mL/Hr) IV Continuous <Continuous>  ferrous    sulfate 325 milliGRAM(s) Oral daily  influenza   Vaccine 0.5 milliLiter(s) IntraMuscular once  insulin lispro (ADMELOG) corrective regimen sliding scale.   SubCutaneous every 6 hours  insulin NPH human recombinant 16 Unit(s) SubCutaneous every 6 hours  multivitamin 1 Tablet(s) Oral daily  pantoprazole  Injectable 40 milliGRAM(s) IV Push daily  polyethylene glycol 3350 17 Gram(s) Oral two times a day  senna 2 Tablet(s) Oral at bedtime  sodium chloride 3%  Inhalation 3 milliLiter(s) Inhalation every 6 hours    MEDICATIONS  (PRN):  acetaminophen    Suspension .. 650 milliGRAM(s) Oral every 6 hours PRN Temp greater or equal to 38C (100.4F), Mild Pain (1 - 3)  glucagon  Injectable 1 milliGRAM(s) IntraMuscular once PRN Glucose LESS THAN 70 milligrams/deciliter  LORazepam   Injectable 0.5 milliGRAM(s) IV Push every 1 hour PRN Agitation  morphine  - Injectable 0.2 milliGRAM(s) IV Push every 1 hour PRN Pain  morphine  - Injectable 0.2 milliGRAM(s) IV Push every 1 hour PRN Dyspnea  ondansetron Injectable 4 milliGRAM(s) IV Push every 6 hours PRN Nausea and/or Vomiting  sodium chloride 0.9% lock flush 10 milliLiter(s) IV Push every 1 hour PRN Pre/post blood products, medications, blood draw, and to maintain line patency    ITEMS UNCHECKED ARE NOT PRESENT    PRESENT SYMPTOMS: [ ]Unable to obtain due to poor mentation   Source if other than patient:  [ ]Family   [ ]Team     Pain: [ ] yes [x ] no .   QOL impact -   Location -                    Aggravating factors -  Quality -  Radiation -  Timing-  Severity (0-10 scale):  Minimal acceptable level (0-10 scale):     Dyspnea:                           [ ]Mild [ ]Moderate [ ]Severe  Anxiety:                             [ ]Mild [ ]Moderate [ ]Severe  Fatigue:                             [ ]Mild [ ]Moderate [ ]Severe  Nausea:                             [ ]Mild [ ]Moderate [ ]Severe  Loss of appetite:              [ ]Mild [ ]Moderate [ ]Severe  Constipation:                    [ ]Mild [ ]Moderate [ ]Severe    PAINAD Score:     [email or web address] (Ctrl +  left click to view)  		  Other Symptoms:  [ ]All other review of systems negative - Limited due to patient being intubated    Palliative Performance Status Version 2:    10     %         [email or web address]    PHYSICAL EXAM:  Vital Signs Last 24 Hrs  T(C): 36.4 (22 Oct 2020 12:22), Max: 37.4 (22 Oct 2020 07:00)  T(F): 97.5 (22 Oct 2020 12:22), Max: 99.4 (22 Oct 2020 07:00)  HR: 60 (22 Oct 2020 18:11) (48 - 87)  BP: 120/48 (22 Oct 2020 12:22) (85/60 - 169/88)  BP(mean): 92 (22 Oct 2020 06:15) (68 - 111)  RR: 16 (22 Oct 2020 12:22) (15 - 23)  SpO2: 95% (22 Oct 2020 18:11) (90% - 100%) I&O's Summary    21 Oct 2020 07:01  -  22 Oct 2020 07:00  --------------------------------------------------------  IN: 2782.2 mL / OUT: 1975 mL / NET: 807.2 mL    22 Oct 2020 07:01  -  22 Oct 2020 19:01  --------------------------------------------------------  IN: 1279.4 mL / OUT: 205 mL / NET: 1074.4 mL    GENERAL:  [x ]Alert  [ ]Oriented x   [ ]Lethargic  [ ]Cachexia  [ ]Unarousable  [ ]Verbal  [ x]Non-Verbal but able to communicate using yes/no  Behavioral:   [ ] Anxiety  [ ] Delirium [ ] Agitation [ ] Other  HEENT:  [ ]Normal   [ ]Dry mouth   [ x]ET Tube/Trach  [ ]Oral lesions  PULMONARY:   [x ]Clear [ ]Tachypnea  [ ]Audible excessive secretions  [ x] mechanical breath sounds  [ ]Rhonchi        [ ]Right [ ]Left [ ]Bilateral  [ ]Crackles        [ ]Right [ ]Left [ ]Bilateral  [ ]Wheezing     [ ]Right [ ]Left [ ]Bilateral  [ ]Diminished breath sounds [ ]right [ ]left [ ]bilateral  CARDIOVASCULAR:    [x ]Regular [ ]Irregular [ ]Tachy  [ ]Simon [ ]Murmur [ ]Other  GASTROINTESTINAL:  [x ]Soft  [ ]Distended   [x ]+BS  [ x]Non tender [ ]Tender  [ ]PEG [x ]OGT/ NGT  Last BM: 10/21  GENITOURINARY:  [ ]Normal [ ] Incontinent   [ ]Oliguria/Anuria   [x ]Cardenas   MUSCULOSKELETAL:   [ ]Normal   [ ]Weakness  [ x]Bed/Wheelchair bound [ ]Edema  NEUROLOGIC:   [ ]No focal deficits  [ ]Cognitive impairment  [ ]Dysphagia [ ]Dysarthria [ 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, unable to break gravity [x ]Other - able to answers yes or no questions with nodding of head  SKIN: Abrasions on forehead and knee  [ ]Normal    [ ]Rash  [ ]Pressure ulcer(s)       Present on admission [ x]y [ ]n    CRITICAL CARE:  [ ] Shock Present  [ ]Septic [ ]Cardiogenic [x]Neurologic [ ]Hypovolemic  [ ]  Vasopressors [x ]  Inotropes   [x ]Respiratory failure present [ x]Mechanical ventilation [ ]Non-invasive ventilatory support [ ]High flow    [x ]Acute  [ ]Chronic [x ]Hypoxic  [ x]Hypercarbic [ ]Other  [ ]Other organ failure     LABS:                        8.6    13.37 )-----------( 271      ( 20 Oct 2020 22:46 )             26.1   10-21    150<H>  |  114<H>  |  29<H>  ----------------------------<  149<H>  3.7   |  28  |  0.81    Ca    8.4      21 Oct 2020 20:53  Phos  4.3     10-21  Mg     2.5     10-21    RADIOLOGY & ADDITIONAL STUDIES:  CXRAY 10/22/2020  Frontal expiratory view of the chest shows the heart to be similar in size. Endotracheal tube reaches the upper thoracic trachea, approximately 2 cm above the medial clavicles. Nasogastric tube and left subclavian line remain present.  The lungs show similar small right effusion. The left lung is clear and there is no evidence of pneumothorax nor left pleural effusion.  IMPRESSION:  ET tube as noted.    PROTEIN CALORIE MALNUTRITION: [ ] mild [ ] moderate [ ] severe  [ ] underweight [ ] morbid obesity    https://www.andeal.org/vault/2440/web/files/ONC/Table_Clinical%20Characteristics%20to%20Document%20Malnutrition-White%20JV%20et%20al%127932.pdf    Height (cm): 170.2 (10-16-20 @ 16:15)  Weight (kg): 81 (10-14-20 @ 20:25)  BMI (kg/m2): 28 (10-16-20 @ 16:15)    [ x] PPSV2 < or = 30% [ ] significant weight loss [ ] poor nutritional intake [ ] anasarca Albumin, Serum: 2.6 g/dL (10-19-20 @ 05:21)    Artificial Nutrition [x ]     REFERRALS:   [x ]Chaplaincy  [ ] Hospice  [ ]Child Life  [ x]Social Work  [ ]Case management [ ]Holistic Therapy [ ] Physical Therapy [ ] Dietary   Goals of Care Document:

## 2020-10-22 NOTE — PROGRESS NOTE ADULT - PROBLEM SELECTOR PLAN 1
extubated 10/18, re-intubated 2 hrs later   -As per NSCU team, pt and family not interested in pursuing trach/PEG   -Unable to perform NIF and VC as per RT  -Now tx to PCU, DNR/do not reintubate extubated 10/18, re-intubated 2 hrs later   -As per NSCU team, pt and family not interested in pursuing trach/PEG   -Unable to perform NIF and VC as per RT  -Now tx to PCU, DNR/do not reintubate  -comfort care

## 2020-10-22 NOTE — PROGRESS NOTE ADULT - ASSESSMENT
ASSESSMENT/PLAN: Traumatic spinal cord injury/PETR A cervical cord injury status post C3-5 laminectomy with C2-6 fusion, post-operative day 6    NEURO:  Neurochecks Q4  Pain control  Spinal precautions  Sedation:  Fent gtt for pain -->RASS 0 to neg 2  Activity: [] mobilize as tolerated [] Bedrest [x] PT [x] OT - splint B/L UE and LE [x] PMNR eval for SCI facility    PULM:  Pressure support as tolerated  May need tracheostomy given high cervical injury--d/w patient and family, however, pt does not seem amenable to trach  Aspiration precautions, VAP bundle.  Goal for extubation in next 24-48 hours to BIPAP  Pulm consult to optimize for extubation.   CXR, ABG    CV: Bradycardia, asystole, shock  Possible vasovagal etiology  MAP >65 mmHg. Levo gtt--> changed to dopamine. Continue  TTE- wnl  Cardiology following  EP following no intervention at this time, likely vagal .       RENAL:  Fluids: SIADH. Resolved  Monitor electrolytes      GI:  Diet: Tube feeds  GI prophylaxis [] not indicated [x] PPI:  [] other:  Bowel regimen    ENDO:    NPH/MAHESH, increase  Goal euglycemia (-180)  A1c 6.3    HEME/ONC:  LE dopplers: No DVTs.  VTE prophylaxis: [x] SCDs  [x] high risk of DVT/PE on admission due to: trauma    ID:  Procal elevated  Sputum cultures- enterobacter, hafnia.   Change to cefepime  Sensitivities reviewed    SOCIAL/FAMILY:  [x] awaiting     CODE STATUS:  [x] Full Code     DISPOSITION:  [x] ICU    [x] Patient is at high risk of neurologic deterioration/death due to: cord compression, acute respiratory failure, septic shock, cardiac arrest    MISC: L subclavian (10/15)   ASSESSMENT/PLAN: Traumatic spinal cord injury/PETR A cervical cord injury status post C3-5 laminectomy with C2-6 fusion, post-operative day 7    NEURO:  Neurochecks Q4  Pain control  Spinal precautions  Sedation:  Fent gtt for pain -->RASS 0 to neg 2  Activity: [] mobilize as tolerated [] Bedrest [x] PT [x] OT - splint B/L UE and LE [x] PMNR eval for SCI facility    PULM:  Terminal extubation in PCU per family wishes    CV: Bradycardia, asystole, shock  Possible vasovagal etiology  MAP >65 mmHg. Levo gtt--> changed to dopamine. Capped dose  TTE- wnl  Cardiology following  EP following no intervention at this time, likely vagal .       RENAL:  Fluids: SIADH. Resolved  Monitor electrolytes      GI:  Diet: Tube feeds  GI prophylaxis [] not indicated [x] PPI:  [] other:  Bowel regimen    ENDO:    NPH/MAHESH, increase  Goal euglycemia (-180)  A1c 6.3    HEME/ONC:  LE dopplers: No DVTs.  VTE prophylaxis: [x] SCDs  [x] high risk of DVT/PE on admission due to: trauma    ID:  Procal elevated  Sputum cultures- enterobacter, hafnia.   Change to cefepime  Sensitivities reviewed    SOCIAL/FAMILY:  [x] Awaiting     CODE STATUS:  [x] Full Code       DISPOSITION:  [x] PCU.   Pt's wishes are clear. Terminal extubation in PCU 10/22.       MISC: L Subclavian (10/15)

## 2020-10-23 NOTE — RAPID RESPONSE TEAM SUMMARY - NSSITUATIONBACKGROUNDRRT_GEN_ALL_CORE
65 year-old Cantonese-speaking man who fell >10 feet from ladder and was found unable to move arms or legs on 10/14/20. He was seen in the [facility] ED where he was a Level 1 Trauma. RRT called for ET tube dislodgement per respiratory. Anesthesia re-intubated patient at bedside. CXR ordered to confirm ET placement. Rest of the VS stable.

## 2020-10-23 NOTE — PROGRESS NOTE ADULT - SUBJECTIVE AND OBJECTIVE BOX
GAP TEAM PALLIATIVE CARE UNIT PROGRESS NOTE:      [  ] Patient on hospice program.    INDICATION FOR PALLIATIVE CARE UNIT SERVICES: symptom management in setting of cervical cord compression s/p C3-C5 laminectomy, C2-C6 fusion and in anticipation of compassionate extubation    INTERVAL HPI/OVERNIGHT EVENTS:  This AM, RRT called for ET tube dislodgement per respiratory. Anesthesia re-intubated patient at bedside. Gregorio Alexandre contacted and informed of events. Patient hypotensive to SBP 72 after reintubation, blood pressure monitored q15 min with improvement to 110/60 most recently.  Patient seen and examined at bedside, resting comfortably.    DNR on chart: Yes      Allergies    No Known Allergies    Intolerances    MEDICATIONS  (STANDING):  albuterol/ipratropium for Nebulization 3 milliLiter(s) Nebulizer every 6 hours  ascorbic acid 500 milliGRAM(s) Oral daily  BACItracin   Ointment 1 Application(s) Topical two times a day  cefepime   IVPB 2000 milliGRAM(s) IV Intermittent every 8 hours  chlorhexidine 0.12% Liquid 15 milliLiter(s) Oral Mucosa every 12 hours  dextrose 5%. 1000 milliLiter(s) (50 mL/Hr) IV Continuous <Continuous>  dextrose 50% Injectable 12.5 Gram(s) IV Push once  dextrose 50% Injectable 25 Gram(s) IV Push once  diphtheria/tetanus/pertussis (acellular) Vaccine (ADAcel) 0.5 milliLiter(s) IntraMuscular once  DOPamine Infusion 4.51 MICROgram(s)/kG/Min (13.7 mL/Hr) IV Continuous <Continuous>  enoxaparin Injectable 40 milliGRAM(s) SubCutaneous <User Schedule>  fentaNYL   Infusion... 1.506 MICROgram(s)/kG/Hr (6.1 mL/Hr) IV Continuous <Continuous>  ferrous    sulfate 325 milliGRAM(s) Oral daily  influenza   Vaccine 0.5 milliLiter(s) IntraMuscular once  insulin lispro (ADMELOG) corrective regimen sliding scale.   SubCutaneous every 6 hours  insulin NPH human recombinant 16 Unit(s) SubCutaneous every 6 hours  multivitamin 1 Tablet(s) Oral daily  pantoprazole  Injectable 40 milliGRAM(s) IV Push daily  polyethylene glycol 3350 17 Gram(s) Oral two times a day  senna 2 Tablet(s) Oral at bedtime  sodium chloride 3%  Inhalation 3 milliLiter(s) Inhalation every 6 hours    MEDICATIONS  (PRN):  acetaminophen    Suspension .. 650 milliGRAM(s) Oral every 6 hours PRN Temp greater or equal to 38C (100.4F), Mild Pain (1 - 3)  glucagon  Injectable 1 milliGRAM(s) IntraMuscular once PRN Glucose LESS THAN 70 milligrams/deciliter  LORazepam   Injectable 0.5 milliGRAM(s) IV Push every 1 hour PRN Agitation  morphine  - Injectable 0.2 milliGRAM(s) IV Push every 1 hour PRN Pain  morphine  - Injectable 0.2 milliGRAM(s) IV Push every 1 hour PRN Dyspnea  ondansetron Injectable 4 milliGRAM(s) IV Push every 6 hours PRN Nausea and/or Vomiting  sodium chloride 0.9% lock flush 10 milliLiter(s) IV Push every 1 hour PRN Pre/post blood products, medications, blood draw, and to maintain line patency    ITEMS UNCHECKED ARE NOT PRESENT    PRESENT SYMPTOMS: [x ]Unable to obtain due to poor mentation - lethargic this AM  Source if other than patient:  [ ]Family   [ ]Team     Pain: [ ] yes [x ] no .   QOL impact -   Location -                    Aggravating factors -  Quality -  Radiation -  Timing-  Severity (0-10 scale):  Minimal acceptable level (0-10 scale):     Dyspnea:                           [ ]Mild [ ]Moderate [ ]Severe  Anxiety:                             [ ]Mild [ ]Moderate [ ]Severe  Fatigue:                             [ ]Mild [ ]Moderate [ ]Severe  Nausea:                             [ ]Mild [ ]Moderate [ ]Severe  Loss of appetite:              [ ]Mild [ ]Moderate [ ]Severe  Constipation:                    [ ]Mild [ ]Moderate [ ]Severe    PAINAD Score: 0    [email or web address] (Ctrl +  left click to view)  		  Other Symptoms:  [ ]All other review of systems negative - Unable to obtain as patient lethargic this AM; patient is intubated    Palliative Performance Status Version 2:    10     %         http://npcrc.org/files/news/palliative_performance_scale_ppsv2.pdf    PHYSICAL EXAM:  Vital Signs Last 24 Hrs  T(C): 36.4 (22 Oct 2020 12:22), Max: 36.4 (22 Oct 2020 12:22)  T(F): 97.5 (22 Oct 2020 12:22), Max: 97.5 (22 Oct 2020 12:22)  HR: 78 (23 Oct 2020 09:00) (48 - 88)  BP: 110/60 (23 Oct 2020 09:00) (72/38 - 120/48)  BP(mean): --  RR: 16 (22 Oct 2020 12:22) (16 - 16)  SpO2: 100% (23 Oct 2020 09:00) (90% - 100%)    I&O's Summary    22 Oct 2020 07:01  -  23 Oct 2020 07:00  --------------------------------------------------------  IN: 1279.4 mL / OUT: 2155 mL / NET: -875.6 mL      GENERAL:  [ ]Alert  [ ]Oriented x   [ x]Lethargic  [ ]Cachexia  [ ]Unarousable  [ ]Verbal  [ x]Non-Verbal due to intubation  Behavioral:   [ ] Anxiety  [ ] Delirium [ ] Agitation [ ] Other  HEENT:  [ ]Normal   [ ]Dry mouth   [ x]ET Tube/Trach  [ ]Oral lesions  PULMONARY:   [ ]Clear [ ]Tachypnea  [ ]Audible excessive secretions  [ x] mechanical breath sounds  [x ]Rhonchi        [ ]Right [ ]Left [ x]Bilateral  [ ]Crackles        [ ]Right [ ]Left [ ]Bilateral  [ ]Wheezing     [ ]Right [ ]Left [ ]Bilateral  [ ]Diminished breath sounds [ ]right [ ]left [ ]bilateral  CARDIOVASCULAR:    [x ]Regular [ ]Irregular [ ]Tachy  [ ]Simon [ ]Murmur [ ]Other  GASTROINTESTINAL:  [x ]Soft  [ ]Distended   [x ]+BS  [ x]Non tender [ ]Tender  [ ]PEG [x ]OGT/ NGT  Last BM: 10/22  GENITOURINARY:  [ ]Normal [ ] Incontinent   [ ]Oliguria/Anuria   [x ]Cardenas   MUSCULOSKELETAL:   [ ]Normal   [ ]Weakness  [ x]Bed/Wheelchair bound [ ]Edema  NEUROLOGIC:   [ ]No focal deficits  [ ]Cognitive impairment  [ ]Dysphagia [ ]Dysarthria [ x]Paresis   SKIN: Abrasions on forehead and knee  [ ]Normal    [ ]Rash  [ ]Pressure ulcer(s)       Present on admission [ x]y [ ]n    CRITICAL CARE:  [ ] Shock Present  [ ]Septic [ ]Cardiogenic [x]Neurologic [ ]Hypovolemic  [ ]  Vasopressors [x ]  Inotropes   [x ]Respiratory failure present [ x]Mechanical ventilation [ ]Non-invasive ventilatory support [ ]High flow    [x ]Acute  [ ]Chronic [x ]Hypoxic  [ x]Hypercarbic [ ]Other  [ ]Other organ failure     LABS: No new labs today    RADIOLOGY & ADDITIONAL STUDIES:  CXRAY 10/22/2020 6:38PM  Impression:  The heart is slightly enlarged. Small right pleural effusion. Left lower lobe pneumonia and/or atelectasis. Endotracheal tube is in good position. NG tube is in the stomach. A central line is seen on the left and the tip is in superior vena cava. Orthopedic hardware is seen in the cervical spine. Metallic clips are seen in soft tissues of the right neck.      PROTEIN CALORIE MALNUTRITION: [ ] mild [ ] moderate [ ] severe  [ ] underweight [ ] morbid obesity    https://www.andeal.org/vault/2440/web/files/ONC/Table_Clinical%20Characteristics%20to%20Document%20Malnutrition-White%20JV%20et%20al%014254.pdf    Height (cm): 170.2 (10-16-20 @ 16:15)  Weight (kg): 81 (10-14-20 @ 20:25)  BMI (kg/m2): 28 (10-16-20 @ 16:15)    [ x] PPSV2 < or = 30% [ ] significant weight loss [ ] poor nutritional intake [ ] anasarca Albumin, Serum: 2.6 g/dL (10-19-20 @ 05:21)    Artificial Nutrition [x ]     REFERRALS:   [x ]Chaplaincy  [ ] Hospice  [ ]Child Life  [ x]Social Work  [ ]Case management [ ]Holistic Therapy [ ] Physical Therapy [ ] Dietary   Goals of Care Document:    GAP TEAM PALLIATIVE CARE UNIT PROGRESS NOTE:      [  ] Patient on hospice program.    INDICATION FOR PALLIATIVE CARE UNIT SERVICES: symptom management in setting of cervical cord compression s/p C3-C5 laminectomy, C2-C6 fusion and in anticipation of compassionate extubation    INTERVAL HPI/OVERNIGHT EVENTS:  This AM, RRT called for ET tube dislodgement per respiratory. Anesthesia re-intubated patient at bedside. Gregorio Alexandre contacted and informed of events. Patient hypotensive to SBP 72 after reintubation, blood pressure monitored q15 min with improvement to 110/60 most recently.  Patient seen and examined at bedside, resting comfortably.    DNR on chart: Yes      Allergies    No Known Allergies    Intolerances    MEDICATIONS  (STANDING):  albuterol/ipratropium for Nebulization 3 milliLiter(s) Nebulizer every 6 hours  ascorbic acid 500 milliGRAM(s) Oral daily  BACItracin   Ointment 1 Application(s) Topical two times a day  cefepime   IVPB 2000 milliGRAM(s) IV Intermittent every 8 hours  chlorhexidine 0.12% Liquid 15 milliLiter(s) Oral Mucosa every 12 hours  dextrose 5%. 1000 milliLiter(s) (50 mL/Hr) IV Continuous <Continuous>  dextrose 50% Injectable 12.5 Gram(s) IV Push once  dextrose 50% Injectable 25 Gram(s) IV Push once  diphtheria/tetanus/pertussis (acellular) Vaccine (ADAcel) 0.5 milliLiter(s) IntraMuscular once  DOPamine Infusion 4.51 MICROgram(s)/kG/Min (13.7 mL/Hr) IV Continuous <Continuous>  enoxaparin Injectable 40 milliGRAM(s) SubCutaneous <User Schedule>  fentaNYL   Infusion... 1.506 MICROgram(s)/kG/Hr (6.1 mL/Hr) IV Continuous <Continuous>  ferrous    sulfate 325 milliGRAM(s) Oral daily  influenza   Vaccine 0.5 milliLiter(s) IntraMuscular once  insulin lispro (ADMELOG) corrective regimen sliding scale.   SubCutaneous every 6 hours  insulin NPH human recombinant 16 Unit(s) SubCutaneous every 6 hours  multivitamin 1 Tablet(s) Oral daily  pantoprazole  Injectable 40 milliGRAM(s) IV Push daily  polyethylene glycol 3350 17 Gram(s) Oral two times a day  senna 2 Tablet(s) Oral at bedtime  sodium chloride 3%  Inhalation 3 milliLiter(s) Inhalation every 6 hours    MEDICATIONS  (PRN):  acetaminophen    Suspension .. 650 milliGRAM(s) Oral every 6 hours PRN Temp greater or equal to 38C (100.4F), Mild Pain (1 - 3)  glucagon  Injectable 1 milliGRAM(s) IntraMuscular once PRN Glucose LESS THAN 70 milligrams/deciliter  LORazepam   Injectable 0.5 milliGRAM(s) IV Push every 1 hour PRN Agitation  morphine  - Injectable 0.2 milliGRAM(s) IV Push every 1 hour PRN Pain  morphine  - Injectable 0.2 milliGRAM(s) IV Push every 1 hour PRN Dyspnea  ondansetron Injectable 4 milliGRAM(s) IV Push every 6 hours PRN Nausea and/or Vomiting  sodium chloride 0.9% lock flush 10 milliLiter(s) IV Push every 1 hour PRN Pre/post blood products, medications, blood draw, and to maintain line patency    ITEMS UNCHECKED ARE NOT PRESENT    PRESENT SYMPTOMS: [x ]Unable to obtain due to poor mentation - lethargic this AM, but arousable  Source if other than patient:  [ ]Family   [ ]Team     Pain: [ ] yes [x ] no .   QOL impact -   Location -                    Aggravating factors -  Quality -  Radiation -  Timing-  Severity (0-10 scale):  Minimal acceptable level (0-10 scale):     Dyspnea:                           [ ]Mild [ ]Moderate [ ]Severe  Anxiety:                             [ ]Mild [ ]Moderate [ ]Severe  Fatigue:                             [ ]Mild [ ]Moderate [ ]Severe  Nausea:                             [ ]Mild [ ]Moderate [ ]Severe  Loss of appetite:              [ ]Mild [ ]Moderate [ ]Severe  Constipation:                    [ ]Mild [ ]Moderate [ ]Severe    PAINAD Score: 0    [email or web address] (Ctrl +  left click to view)  		  Other Symptoms:  [ ]All other review of systems negative - Unable to obtain as patient lethargic this AM; patient is intubated    Palliative Performance Status Version 2:    10     %         http://npcrc.org/files/news/palliative_performance_scale_ppsv2.pdf    PHYSICAL EXAM:  Vital Signs Last 24 Hrs  T(C): 36.4 (22 Oct 2020 12:22), Max: 36.4 (22 Oct 2020 12:22)  T(F): 97.5 (22 Oct 2020 12:22), Max: 97.5 (22 Oct 2020 12:22)  HR: 78 (23 Oct 2020 09:00) (48 - 88)  BP: 110/60 (23 Oct 2020 09:00) (72/38 - 120/48)  BP(mean): --  RR: 16 (22 Oct 2020 12:22) (16 - 16)  SpO2: 100% (23 Oct 2020 09:00) (90% - 100%)    I&O's Summary    22 Oct 2020 07:01  -  23 Oct 2020 07:00  --------------------------------------------------------  IN: 1279.4 mL / OUT: 2155 mL / NET: -875.6 mL      GENERAL:  [ ]Alert  [ ]Oriented x   [ x]Lethargic  [ ]Cachexia  [ ]Unarousable  [ ]Verbal  [ x]Non-Verbal due to intubation  Behavioral:   [ ] Anxiety  [ ] Delirium [ ] Agitation [ ] Other  HEENT:  [ ]Normal   [ ]Dry mouth   [ x]ET Tube/Trach  [ ]Oral lesions  PULMONARY:   [ ]Clear [ ]Tachypnea  [ ]Audible excessive secretions  [ x] mechanical breath sounds  [x ]Rhonchi        [ ]Right [ ]Left [ x]Bilateral  [ ]Crackles        [ ]Right [ ]Left [ ]Bilateral  [ ]Wheezing     [ ]Right [ ]Left [ ]Bilateral  [ ]Diminished breath sounds [ ]right [ ]left [ ]bilateral  CARDIOVASCULAR:    [x ]Regular [ ]Irregular [ ]Tachy  [ ]Simon [ ]Murmur [ ]Other  GASTROINTESTINAL:  [x ]Soft  [ ]Distended   [x ]+BS  [ x]Non tender [ ]Tender  [ ]PEG [x ]OGT/ NGT  Last BM: 10/22  GENITOURINARY:  [ ]Normal [ ] Incontinent   [ ]Oliguria/Anuria   [x ]Cardenas critical care monitoring/quadraplegia  MUSCULOSKELETAL:   [ ]Normal   [ ]Weakness  [ x]Bed/Wheelchair bound [ ]Edema  NEUROLOGIC:   [ ]No focal deficits  [ ]Cognitive impairment  [ ]Dysphagia [ ]Dysarthria [ x]Paresis global  SKIN: Abrasions on forehead and knee  [x ]Normal    [ ]Rash  [ ]Pressure ulcer(s)       Present on admission [ x]y [ ]n    CRITICAL CARE:  [ ] Shock Present  [ ]Septic [ ]Cardiogenic [x]Neurologic [ ]Hypovolemic  [ ]  Vasopressors [x ]  Inotropes   [x ]Respiratory failure present [ x]Mechanical ventilation [ ]Non-invasive ventilatory support [ ]High flow    [x ]Acute  [ ]Chronic [x ]Hypoxic  [ x]Hypercarbic [ ]Other  [ ]Other organ failure     LABS: No new labs today    RADIOLOGY & ADDITIONAL STUDIES:  CXRAY 10/22/2020 6:38PM  Impression:  The heart is slightly enlarged. Small right pleural effusion. Left lower lobe pneumonia and/or atelectasis. Endotracheal tube is in good position. NG tube is in the stomach. A central line is seen on the left and the tip is in superior vena cava. Orthopedic hardware is seen in the cervical spine. Metallic clips are seen in soft tissues of the right neck.      PROTEIN CALORIE MALNUTRITION: [ ] mild [ ] moderate [ ] severe  [ ] underweight [ ] morbid obesity    https://www.andeal.org/vault/2440/web/files/ONC/Table_Clinical%20Characteristics%20to%20Document%20Malnutrition-White%20JV%20et%20al%202012.pdf    Height (cm): 170.2 (10-16-20 @ 16:15)  Weight (kg): 81 (10-14-20 @ 20:25)  BMI (kg/m2): 28 (10-16-20 @ 16:15)    [ x] PPSV2 < or = 30% [ ] significant weight loss [ ] poor nutritional intake [ ] anasarca Albumin, Serum: 2.6 g/dL (10-19-20 @ 05:21)    Artificial Nutrition [x ]     REFERRALS:   [x ]Chaplaincy  [ ] Hospice  [ ]Child Life  [ x]Social Work  [ ]Case management [ ]Holistic Therapy [ ] Physical Therapy [ ] Dietary   Goals of Care Document:

## 2020-10-23 NOTE — CHART NOTE - NSCHARTNOTEFT_GEN_A_CORE
On-call fellow paged by RN as patient's ET tube dislodged per respiratory. Medical RRT called to patient's bedside. Anesthesia re-intubated patient. CXR ordered to confirm ET placement.     - Discussed case with Dr. Womack and Dr. Bae (PCU Fellow). Dr. Bae will call family.   - f/u CXR   - Continued GOC discussion regarding compassionate extubation and Do not reintubate, Tube feeds

## 2020-10-23 NOTE — PHARMACOTHERAPY INTERVENTION NOTE - COMMENTS
Patient on cefepime for pneumonia since 10/21.  Previously on Zosyn from 10/18-10/21.  Recommend 7 days of antibiotics.  Adjusted end date accordingly.      Yanna Arenas, PharmD  Clinical Pharmacist, Infectious Diseases  [phone]

## 2020-10-23 NOTE — RAPID RESPONSE TEAM SUMMARY - NSOTHERINTERVENTIONSRRT_GEN_ALL_CORE
- Primary Attending Dr. Hough was contacted over the phone who is aware of the situation. Plan is to have a family meeting on 10/23 to determine GOC. Currently DNR but not DNI. No other interventions done except for re-intubation and ETT placement confirmed on chest x-ray.

## 2020-10-23 NOTE — PROGRESS NOTE ADULT - PROBLEM SELECTOR PLAN 1
- Extubated on 10/18 and reintubated within few hours due to hypoxia and hypercarbia.   - ETT dislodged this AM with reintubation subsequently    - On mechanical ventilation   - Plan for compassionate extubation - Extubated on 10/18 and reintubated within few hours due to hypoxia and hypercarbia.   - ETT dislodged this AM with reintubation subsequently, devices confirmed in good position via cxr  - On mechanical ventilation   - Plan for compassionate extubation

## 2020-10-23 NOTE — CHART NOTE - NSCHARTNOTEFT_GEN_A_CORE
Called to patient's bedside for intubation.  Therapy initiated by primary medical team. On arrival patient with SaO2 50-80% previously intubated with decreasing returned tidal volumes.    Patient Assessment:   Initially attempted with MAC 3 unable to visualize.  With glidescope able to visualize cuff above glottis.  Cuff deflated and advanced under visualization with glidescope and cuff reinflated however no increase in delivered tidal volume noted.  Requested by primary team to change tube.  Tube replacement as below.      Baseline Vital Signs:  BP: 54/31  HR:   RR: on ventilator  SpO2:     Medications Administered: None    Intubation:      [ ] Direct Laryngoscopy    [X ] Video-Assisted Laryngoscopy   [ ] Fiberoptic Intubation    Blade:   Cormack-Lehane View: [X ] 1     [ ] 2A     [ ] 2B     [ ] 3     [ ]  4  ETT Size: 8.0  Marking at Teeth: 23        Positive end-tidal carbon dioxide via EasyCap, positive bilateral breath sounds.  CXR to be reviewed by primary team.

## 2020-10-23 NOTE — PROGRESS NOTE ADULT - ATTENDING COMMENTS
Pt stable neurologically.  Currently on 3rd of 3 days of pressors to maintain MAP > 85 mm Hg  DC hemovac  Awaiting bedside discussions with pt/family/NSCU regarding extubation, possible reintubation (if needed), and tracheostomy (if needed).  Pt DNR.
Pt now in Palliative Care unit, on ventilator.
Pt stable from motor / sensory standpoint.  Had episode of asystole thought to be autonomic in nature (occurred after bladder catheterization). Cardiology consult appreciated, pt now on Dopamine. EPS consult pending.  Pt required reintubation after extubation yesterday.  Family conference pending regarding goals of care (extubation, tracheostomy, PEG)  Pt DNR.
Pt stable neurologically  On ventilator  Abx for pneumonia  Awaiting family decision of further care
Pt's motor exam improving slightly in B deltoid, biceps, triceps.  C5 sensory level  Continue hemovac and CPAP trial  Pt DNR
I have personally seen and examined this patient and agree with the above assessment and plan, which I have reviewed and edited where appropriate.   Patient with quadraplegia s/p fall from a ladder.  Awaiting time for compassionate extubation.  Family will need extensive support.  Social work/chaplaincy involved.  Prognosis is poor for functional recovery.
I have personally seen and examined this patient and agree with the above assessment and plan, which I have reviewed and edited where appropriate.   Patient with new quadriplegia due to cord compression from a fall, s/p laminectomy/stabilization.    Plan for compassionate extubation.  Family struggling with suddenness of event in a previously healthy man.  Social work and chaplaincy support.  Continue supportive care.

## 2020-10-23 NOTE — PROGRESS NOTE ADULT - ASSESSMENT
65 year-old Cantonese-speaking man who fell >10 feet from ladder and was found unable to move arms or legs on 10/14/20. He was seen in the [facility] ED where he was a Level 1 Trauma. He was noted to have an PETR A cervical cord injury with MRI notable for C3-4 central disc herniation resulting in compression of the spinal cord associated with spinal cord edema from C3 through C4 as well as extensive prevertebral soft tissue swelling at C1 through C5 and C7-T3 with probable disruption of the anterior longitudinal ligament and interspinous ligamentous injury at C2-C6 for which he was taken for emergent C3-5 laminectomy with C2-6 fusion.  Patient was Extubated and reintubated due to hypoxia and hypercarbia on 10/18. Found to have Bradycardia with Significant sinus pause of 45s.  Palliative Care consulted for goals of care 65 year-old Cantonese-speaking man who fell >10 feet from ladder and was found unable to move arms or legs on 10/14/20. He was seen in the [facility] ED where he was a Level 1 Trauma. He was noted to have an PETR A cervical cord injury with MRI notable for C3-4 central disc herniation resulting in compression of the spinal cord associated with spinal cord edema from C3 through C4 as well as extensive prevertebral soft tissue swelling at C1 through C5 and C7-T3 with probable disruption of the anterior longitudinal ligament and interspinous ligamentous injury at C2-C6 for which he was taken for emergent C3-5 laminectomy with C2-6 fusion.  Patient was Extubated and reintubated due to hypoxia and hypercarbia on 10/18. Found to have Bradycardia with Significant sinus pause of 45s.  Palliative Care consulted for goals of care and pt transferred to PCU in anticipation of compassionate extubation.

## 2020-10-24 NOTE — DISCHARGE NOTE FOR THE EXPIRED PATIENT - SECONDARY DIAGNOSIS.
Pneumonia due to methicillin susceptible Staphylococcus aureus, unspecified laterality, unspecified part of lung Cervical spinal cord compression

## 2020-10-24 NOTE — PROGRESS NOTE ADULT - PROBLEM SELECTOR PLAN 7
- DNR. Plans for Compassionate Extubation. Will discuss with family abut DNI today.  - Son wants to continue tube feeds for the interim and to complete antibiotic course for pneumonia. Son agreeable to discontinuing labs, but wants to consider BMP if becomes encephalopathic to check for hypernatremia. - DNR. Plans for Compassionate Extubation today. Will stop feeds today as well. As per family, goal is comfort.

## 2020-10-24 NOTE — AIRWAY REMOVAL NOTE  ADULT & PEDS - ARTIFICAL AIRWAY REMOVAL COMMENTS
Written order for extubation verified. The patient was identified by full name and birth date compared to the identification band. Present during the procedure was DANGELO Bustamante.

## 2020-10-24 NOTE — PROGRESS NOTE ADULT - PROBLEM SELECTOR PLAN 1
- Extubated on 10/18 and reintubated within few hours due to hypoxia and hypercarbia. Titrated oxygen from 100% to 70% and pt was hypoxic, thus increased back to 100%.  - On mechanical ventilation   - Plan for compassionate extubation, TBD. Family meeting today. - Extubated on 10/18 and reintubated within few hours due to hypoxia and hypercarbia. Titrated oxygen from 100% to 70% and pt was hypoxic, thus increased back to 100%.  - On mechanical ventilation   - Family meeting held today. Plan for palliative extubation today as family feels pt is suffering.

## 2020-10-24 NOTE — DISCHARGE NOTE FOR THE EXPIRED PATIENT - HOSPITAL COURSE
65 year-old  man who fell >10 feet from ladder and was found unable to move arms or legs on 10/14/20. He was seen in the [facility] ED where he was a Level 1 Trauma. He was noted to have an PETR A cervical cord injury with MRI notable for C3-4 central disc herniation resulting in compression of the spinal cord associated with spinal cord edema from C3 through C4 as well as extensive prevertebral soft tissue swelling at C1 through C5 and C7-T3 with probable disruption of the anterior longitudinal ligament and interspinous ligamentous injury at C2-C6 for which he was taken for emergent C3-5 laminectomy with C2-6 fusion.  Patient was Extubated and reintubated due to hypoxia and hypercarbia on 10/18 2/2 to [facility]. Found to have Bradycardia with Significant sinus pause of 45s.  Palliative Care consulted for goals of care and pt transferred to PCU in anticipation of compassionate extubation. Family meeting held today. Family states pt does not wish to be intubated thus would like to palliative extubate today given this is not the QOL the patient would want. Family at bedside. Pt  on 10/24/2020 at 13:14. 65 year-old  man who fell >10 feet from ladder and was found unable to move arms or legs on 10/14/20. He was seen in the [facility] ED where he was a Level 1 Trauma. He was noted to have an PETR A cervical cord injury with MRI notable for C3-4 central disc herniation resulting in compression of the spinal cord associated with spinal cord edema from C3 through C4 as well as extensive prevertebral soft tissue swelling at C1 through C5 and C7-T3 with probable disruption of the anterior longitudinal ligament and interspinous ligamentous injury at C2-C6 for which he was taken for emergent C3-5 laminectomy with C2-6 fusion.  Patient was Extubated and reintubated due to hypoxia and hypercarbia on 10/18 2/2 to [facility]. Found to have Bradycardia with Significant sinus pause of 45s.  Palliative Care consulted for goals of care and pt transferred to PCU in anticipation of compassionate extubation. Family meeting held today. Family states pt does not wish to be intubated thus would like to palliative extubate today given this is not the QOL the patient would want. Family at bedside. Pt  on 10/24/2020 at 13:14. Pt was accepted as an ME case by Terese Thomas. Family is aware.

## 2020-10-24 NOTE — PROGRESS NOTE ADULT - CONVERSATION DETAILS
Spoke extensively with family members. They stated they have discussed with their entirely family which route is best for their loved one. Son states his father when lucid stated he did not want to be on a ventilator as this is not the QOL he envisions. Thus, family would like to honor their loved one decision and palliative extubation is their goal today. They will virtually have their  visit before this. Their main goal is comfort. Spoke extensively with family members. They stated they have discussed with their entirely family which route is best for their loved one. Son states his father when lucid stated he did not want to be on a ventilator as this is not the QOL he envisions. Thus, family would like to honor their loved one decision and palliative extubation is their goal today. They will virtually have their  visit before this. Their main goal is comfort.. Discussed pre and post extubation care and specific interventions like symptom management.  ACP Time > 46 min

## 2020-10-24 NOTE — PROGRESS NOTE ADULT - SUBJECTIVE AND OBJECTIVE BOX
GAP TEAM PALLIATIVE CARE UNIT PROGRESS NOTE:      [  ] Patient on hospice program.    INDICATION FOR PALLIATIVE CARE UNIT SERVICES: Palliative extubation    INTERVAL HPI/OVERNIGHT EVENTS: Improvement in mental status. Following commands. Pt states he is thirsty. Overnight RN alerted me of hypoxic episode requiring increase in FiO2.    DNR on chart: Yes      Allergies:  No Known Allergies    MEDICATIONS  (STANDING):  albuterol/ipratropium for Nebulization 3 milliLiter(s) Nebulizer every 6 hours  ascorbic acid 500 milliGRAM(s) Oral daily  BACItracin   Ointment 1 Application(s) Topical two times a day  cefepime   IVPB 2000 milliGRAM(s) IV Intermittent every 8 hours  chlorhexidine 0.12% Liquid 15 milliLiter(s) Oral Mucosa every 12 hours  dextrose 5%. 1000 milliLiter(s) (50 mL/Hr) IV Continuous <Continuous>  dextrose 5%. 1000 milliLiter(s) (40 mL/Hr) IV Continuous <Continuous>  dextrose 50% Injectable 12.5 Gram(s) IV Push once  dextrose 50% Injectable 25 Gram(s) IV Push once  diphtheria/tetanus/pertussis (acellular) Vaccine (ADAcel) 0.5 milliLiter(s) IntraMuscular once  DOPamine Infusion 4.51 MICROgram(s)/kG/Min (13.7 mL/Hr) IV Continuous <Continuous>  enoxaparin Injectable 40 milliGRAM(s) SubCutaneous <User Schedule>  fentaNYL   Infusion... 1.506 MICROgram(s)/kG/Hr (6.1 mL/Hr) IV Continuous <Continuous>  ferrous    sulfate 325 milliGRAM(s) Oral daily  influenza   Vaccine 0.5 milliLiter(s) IntraMuscular once  insulin lispro (ADMELOG) corrective regimen sliding scale.   SubCutaneous every 6 hours  insulin NPH human recombinant 16 Unit(s) SubCutaneous every 6 hours  multivitamin 1 Tablet(s) Oral daily  pantoprazole  Injectable 40 milliGRAM(s) IV Push daily  polyethylene glycol 3350 17 Gram(s) Oral two times a day  senna 2 Tablet(s) Oral at bedtime  sodium chloride 3%  Inhalation 3 milliLiter(s) Inhalation every 6 hours    MEDICATIONS  (PRN):  acetaminophen    Suspension .. 650 milliGRAM(s) Oral every 6 hours PRN Temp greater or equal to 38C (100.4F), Mild Pain (1 - 3)  glucagon  Injectable 1 milliGRAM(s) IntraMuscular once PRN Glucose LESS THAN 70 milligrams/deciliter  LORazepam   Injectable 0.5 milliGRAM(s) IV Push every 1 hour PRN Agitation  morphine  - Injectable 0.2 milliGRAM(s) IV Push every 1 hour PRN Pain  morphine  - Injectable 0.2 milliGRAM(s) IV Push every 1 hour PRN Dyspnea  ondansetron Injectable 4 milliGRAM(s) IV Push every 6 hours PRN Nausea and/or Vomiting  sodium chloride 0.9% lock flush 10 milliLiter(s) IV Push every 1 hour PRN Pre/post blood products, medications, blood draw, and to maintain line patency    ITEMS UNCHECKED ARE NOT PRESENT    PRESENT SYMPTOMS: [ ]Unable to obtain due to poor mentation   Source if other than patient:  [ ]Family   [ ]Team     Pain: [ ] yes [ ] no  QOL impact -   Location -                    Aggravating factors -  Quality -  Radiation -  Timing-  Severity (0-10 scale):  Minimal acceptable level (0-10 scale):     Dyspnea:                           [ ]Mild [ ]Moderate [ ]Severe  Anxiety:                             [ ]Mild [ ]Moderate [ ]Severe  Fatigue:                             [ ]Mild [ ]Moderate [ ]Severe  Nausea:                             [ ]Mild [ ]Moderate [ ]Severe  Loss of appetite:              [ ]Mild [ ]Moderate [ ]Severe  Constipation:                    [ ]Mild [ ]Moderate [ ]Severe    PAINAD Score:    [email or web address]/[email or web address] (Ctrl +  left click to view)  		  Other Symptoms:  [ ]All other review of systems negative     Palliative Performance Status Version 2:         %         http://[email or web address]/files/news/[email or web address]_performance_scale_[email or web address].pdf  PHYSICAL EXAM:  Vital Signs Last 24 Hrs  T(C): --  T(F): --  HR: 100 (24 Oct 2020 09:05) (68 - 100)  BP: --  BP(mean): --  RR: --  SpO2: 88% (24 Oct 2020 09:05) (88% - 98%) I&O's Summary    23 Oct 2020 07:01  -  24 Oct 2020 07:00  --------------------------------------------------------  IN: 0 mL / OUT: 1200 mL / NET: -1200 mL    GENERAL:  [ ]Alert  [ ]Oriented x   [ ]Lethargic  [ ]Cachexia  [ ]Unarousable  [ ]Verbal  [ ]Non-Verbal  Behavioral:   [ ] Anxiety  [ ] Delirium [ ] Agitation [ ] Other  HEENT:  [ ]Normal   [ ]Dry mouth   [ ]ET Tube/Trach  [ ]Oral lesions  PULMONARY:   [ ]Clear [ ]Tachypnea  [ ]Audible excessive secretions   [ ]Rhonchi        [ ]Right [ ]Left [ ]Bilateral  [ ]Crackles        [ ]Right [ ]Left [ ]Bilateral  [ ]Wheezing     [ ]Right [ ]Left [ ]Bilateral  [ ]Diminshed BS [ ]Right [ ]Left [ ]Bilateral    CARDIOVASCULAR:    [ ]Regular [ ]Irregular [ ]Tachy  [ ]Simon [ ]Murmur [ ]Other  GASTROINTESTINAL:  [ ]Soft  [ ]Distended   [ ]+BS  [ ]Non tender [ ]Tender  [ ]PEG [ ]OGT/ NGT   Last BM:       GENITOURINARY:  [ ]Normal [ ] Incontinent   [ ]Oliguria/Anuria   [ ]Cardenas  MUSCULOSKELETAL:   [ ]Normal   [ ]Weakness  [ ]Bed/Wheelchair bound [ ]Edema  NEUROLOGIC:   [ ]No focal deficits  [ ] Cognitive impairment  [ ] Dysphagia [ ]Dysarthria [ ] Paresis [ ]Other   SKIN:   [ ]Normal  [ ]Rash     [ ]Pressure ulcer(s)  [ ]y [ ]n  Present on admission      CRITICAL CARE:  [ ] Shock Present  [ ]Septic [ ]Cardiogenic [ ]Neurologic [ ]Hypovolemic  [ ]  Vasopressors [ ]  Inotropes   [ ] Respiratory failure present [ ] Mechanical Ventilation [ ] Non-invasive ventilatory support [ ] High-Flow  [ ] Acute  [ ] Chronic [ ] Hypoxic  [ ] Hypercarbic [ ] Other  [ ] Other organ failure     LABS:            RADIOLOGY & ADDITIONAL STUDIES:    PROTEIN CALORIE MALNUTRITION: [ ] mild [ ] moderate [ ] severe  [ ] underweight [ ] morbid obesity    https://www.andeal.org/vault/2440/web/files/ONC/Table_Clinical%20Characteristics%20to%20Document%20Malnutrition-White%20JV%20et%20al%202012.pdf    Height (cm): 170.2 (10-16-20 @ 16:15)  Weight (kg): 81 (10-14-20 @ 20:25)  BMI (kg/m2): 28 (10-16-20 @ 16:15)    [ ] PPSV2 < or = 30% [ ] significant weight loss [ ] poor nutritional intake [ ] anasarca Albumin, Serum: 2.6 g/dL (10-19-20 @ 05:21)    Artificial Nutrition [ ]     REFERRALS:   [ ]Chaplaincy  [ ] Hospice  [ ]Child Life  [ ]Social Work  [ ]Case management [ ]Holistic Therapy [ ] Physical Therapy [ ] Dietary   Goals of Care Document:

## 2020-10-24 NOTE — PROGRESS NOTE ADULT - ASSESSMENT
65 year-old Cantonese-speaking man who fell >10 feet from ladder and was found unable to move arms or legs on 10/14/20. He was seen in the [facility] ED where he was a Level 1 Trauma. He was noted to have an PETR A cervical cord injury with MRI notable for C3-4 central disc herniation resulting in compression of the spinal cord associated with spinal cord edema from C3 through C4 as well as extensive prevertebral soft tissue swelling at C1 through C5 and C7-T3 with probable disruption of the anterior longitudinal ligament and interspinous ligamentous injury at C2-C6 for which he was taken for emergent C3-5 laminectomy with C2-6 fusion.  Patient was Extubated and reintubated due to hypoxia and hypercarbia on 10/18. Found to have Bradycardia with Significant sinus pause of 45s.  Palliative Care consulted for goals of care and pt transferred to PCU in anticipation of compassionate extubation.

## 2022-01-05 NOTE — PROGRESS NOTE ADULT - PROBLEM SELECTOR PLAN 4
-Sputum culture 10/18 +enterobacter, Hafnia alvei, staph aureus.   -BC NGTD  -Continue with Cefepime Yes

## 2022-02-21 NOTE — RAPID RESPONSE TEAM SUMMARY - NSREASONFORCALLINGRRT_GEN_ALL_CORE
Moderate obstructive sleep apnea on Auto CPAP with optimal obstructive sleep apnea control   Working on weight loss   Had gastric bypass in 2005   Wt Readings from Last 8 Encounters:   02/21/22 109 kg (240 lb 3.1 oz)   09/16/21 112.6 kg (248 lb 3.8 oz)   08/11/21 111.5 kg (245 lb 13 oz)   02/18/21 117.3 kg (258 lb 9.6 oz)   12/15/20 114.5 kg (252 lb 6.8 oz)   08/10/20 111 kg (244 lb 13.1 oz)   06/10/20 108.4 kg (238 lb 15.7 oz)   06/10/20 108.4 kg (238 lb 15.7 oz)   Body mass index is 41.23 kg/m².       Hypoxia

## 2025-04-15 NOTE — PATIENT PROFILE ADULT - NSPROPASSIVESMOKEEXPOSURE_GEN_A_NUR
Left message for patient to schedule annual visit with PCP  HM and immunization's reviewed and updated.  Patient is due for LIPID.   Please help schedule or if already done please get information to get records.  
No

## 2025-06-23 NOTE — PROGRESS NOTE ADULT - PROBLEM SELECTOR PROBLEM 3
Cervical spinal cord compression
18